# Patient Record
Sex: MALE | Race: WHITE | NOT HISPANIC OR LATINO | Employment: OTHER | URBAN - METROPOLITAN AREA
[De-identification: names, ages, dates, MRNs, and addresses within clinical notes are randomized per-mention and may not be internally consistent; named-entity substitution may affect disease eponyms.]

---

## 2018-08-15 ENCOUNTER — TRANSFERRED RECORDS (OUTPATIENT)
Dept: HEALTH INFORMATION MANAGEMENT | Facility: CLINIC | Age: 78
End: 2018-08-15

## 2019-09-30 ENCOUNTER — OFFICE VISIT (OUTPATIENT)
Dept: FAMILY MEDICINE | Facility: CLINIC | Age: 79
End: 2019-09-30
Payer: MEDICARE

## 2019-09-30 VITALS
HEIGHT: 66 IN | DIASTOLIC BLOOD PRESSURE: 75 MMHG | BODY MASS INDEX: 29.09 KG/M2 | OXYGEN SATURATION: 98 % | WEIGHT: 181 LBS | SYSTOLIC BLOOD PRESSURE: 135 MMHG | TEMPERATURE: 97.9 F | HEART RATE: 70 BPM

## 2019-09-30 DIAGNOSIS — I71.40 ABDOMINAL AORTIC ANEURYSM (AAA) WITHOUT RUPTURE (H): ICD-10-CM

## 2019-09-30 DIAGNOSIS — G47.33 OSA (OBSTRUCTIVE SLEEP APNEA): ICD-10-CM

## 2019-09-30 DIAGNOSIS — Z23 NEED FOR PROPHYLACTIC VACCINATION AND INOCULATION AGAINST INFLUENZA: ICD-10-CM

## 2019-09-30 DIAGNOSIS — Z77.090 EXPOSURE TO ASBESTOS: ICD-10-CM

## 2019-09-30 DIAGNOSIS — Z12.5 SPECIAL SCREENING FOR MALIGNANT NEOPLASM OF PROSTATE: ICD-10-CM

## 2019-09-30 DIAGNOSIS — J41.0 SIMPLE CHRONIC BRONCHITIS (H): ICD-10-CM

## 2019-09-30 DIAGNOSIS — L98.9 SKIN LESION: ICD-10-CM

## 2019-09-30 DIAGNOSIS — I10 HYPERTENSION, UNSPECIFIED TYPE: ICD-10-CM

## 2019-09-30 DIAGNOSIS — Z76.89 ENCOUNTER TO ESTABLISH CARE: Primary | ICD-10-CM

## 2019-09-30 DIAGNOSIS — J84.9 ILD (INTERSTITIAL LUNG DISEASE) (H): ICD-10-CM

## 2019-09-30 DIAGNOSIS — E78.5 HYPERLIPIDEMIA LDL GOAL <100: ICD-10-CM

## 2019-09-30 PROCEDURE — 99204 OFFICE O/P NEW MOD 45 MIN: CPT | Mod: 25 | Performed by: INTERNAL MEDICINE

## 2019-09-30 PROCEDURE — 90662 IIV NO PRSV INCREASED AG IM: CPT | Performed by: INTERNAL MEDICINE

## 2019-09-30 PROCEDURE — G0008 ADMIN INFLUENZA VIRUS VAC: HCPCS | Performed by: INTERNAL MEDICINE

## 2019-09-30 RX ORDER — UBIDECARENONE 75 MG
100 CAPSULE ORAL DAILY
COMMUNITY
End: 2023-12-01

## 2019-09-30 RX ORDER — LOSARTAN POTASSIUM 50 MG/1
50 TABLET ORAL DAILY
Refills: 3 | COMMUNITY
Start: 2019-09-25 | End: 2020-04-28

## 2019-09-30 SDOH — HEALTH STABILITY: MENTAL HEALTH: HOW OFTEN DO YOU HAVE A DRINK CONTAINING ALCOHOL?: 2-4 TIMES A MONTH

## 2019-09-30 ASSESSMENT — MIFFLIN-ST. JEOR: SCORE: 1475.82

## 2019-09-30 NOTE — PROGRESS NOTES
Subjective     Nicky Hernández is a 79 year old male who presents to clinic today for the following health issues:    HPI   New Patient/Transfer of Care  Patient presenting for establishing care , patient has no medical concerns at this time, he denies any chest pain, shortness of breath, he was diagnosed with interstitial lung disease in the past at [facility] and COPD; quit smoking 4 years ago, had 30-year pack smoking used to smoke pipes mainly, he uses a CPAP for 14 years now, he needs new filters and tubes and masks.  Will refer to sleep study.  Denies any allergies, denies any chest pain palpitations presyncope or syncope, denies any chest tightness, never had PFTs done, he never used inhalers in the past, no GI symptoms, no melena or hematochezia, he had a colonoscopy last year and was told to do in 10 years he has a urine void stream is slow he has nocturia x2.  He describes he might have been exposed to asbestos.  Family history Father had lung cancer,  previous surgical history none, also patient describes that he was treated for Lyme disease 2 years ago, he did have the rash with that, he was sick looking he was given doxycycline for 4 weeks.  He does have an abdominal aortic aneurysm at 4.2 cm and stable as per the patient, no records are available.    Patient Active Problem List   Diagnosis     Hypertension, unspecified type     Abdominal aortic aneurysm (AAA) without rupture (H)     Skin lesion     RICHIE (obstructive sleep apnea)     Hyperlipidemia LDL goal <100     ILD (interstitial lung disease) (H)     Simple chronic bronchitis (H)     Exposure to asbestos     History reviewed. No pertinent surgical history.    Social History     Tobacco Use     Smoking status: Former Smoker     Packs/day: 0.00     Smokeless tobacco: Never Used   Substance Use Topics     Alcohol use: Yes     Frequency: 2-4 times a month     Family History   Problem Relation Age of Onset     Lung Cancer Father          Current Outpatient  "Medications   Medication Sig Dispense Refill     aspirin (ASA) 81 MG tablet Take 81 mg by mouth daily       atorvastatin (LIPITOR) 40 MG tablet Take 1 tablet (40 mg) by mouth daily 90 tablet 0     cyanocobalamin (VITAMIN B-12) 100 MCG tablet Take 100 mcg by mouth daily       atorvastatin (LIPITOR) 40 MG tablet Take 1 tablet (40 mg) by mouth daily 90 tablet 0     losartan (COZAAR) 50 MG tablet Take 50 mg by mouth daily  3     No Known Allergies  Recent Labs   Lab Test 10/01/19  1000   *   HDL 44   TRIG 165*   ALT 21   CR 0.85   GFRESTIMATED 83   GFRESTBLACK >90   POTASSIUM 4.1      BP Readings from Last 3 Encounters:   09/30/19 135/75    Wt Readings from Last 3 Encounters:   09/30/19 82.1 kg (181 lb)                    Reviewed and updated as needed this visit by Provider  Med Hx  Surg Hx         Review of Systems   ROS COMP: Constitutional, HEENT, cardiovascular, pulmonary, GI, , musculoskeletal, neuro, skin, endocrine and psych systems are negative, except as otherwise noted.      Objective    /75 (BP Location: Right arm, Cuff Size: Adult Regular)   Pulse 70   Temp 97.9  F (36.6  C) (Tympanic)   Ht 1.664 m (5' 5.5\")   Wt 82.1 kg (181 lb)   SpO2 98%   BMI 29.66 kg/m    Body mass index is 29.66 kg/m .  Physical Exam   GENERAL: healthy, alert and no distress  EYES: Eyes grossly normal to inspection, PERRL and conjunctivae and sclerae normal  HENT: ear canals and TM's normal, nose and mouth without ulcers or lesions  NECK: no adenopathy, no asymmetry, masses, or scars and thyroid normal to palpation  RESP: lungs clear to auscultation - no rales, rhonchi or wheezes  CV: regular rate and rhythm, normal S1 S2, no S3 or S4, no murmur, click or rub, no peripheral edema and peripheral pulses strong  ABDOMEN: soft, nontender, no hepatosplenomegaly, no masses and bowel sounds normal   (male): Declined by patient  MS: no gross musculoskeletal defects noted, no edema  SKIN: no suspicious lesions or " rashes multiple seborrheic keratotic-like lesions on the forehead and by temples and one on the right temple under the hair that is around 1.5 cm that is pretty dark and pigmented and raised.  NEURO: Normal strength and tone, mentation intact and speech normal  PSYCH: mentation appears normal, affect normal/bright  LYMPH: no cervical, supraclavicular, axillary, or inguinal adenopathy    Diagnostic Test Results:  Labs reviewed in Epic        Assessment & Plan   Problem List Items Addressed This Visit        Respiratory    RICHIE (obstructive sleep apnea)    Relevant Orders    SLEEP EVALUATION & MANAGEMENT REFERRAL - [facility] [phone]  (Age 18 and up)    ILD (interstitial lung disease) (H)    Simple chronic bronchitis (H)       Endocrine    Hyperlipidemia LDL goal <100    Relevant Medications    atorvastatin (LIPITOR) 40 MG tablet    Other Relevant Orders    Lipid panel reflex to direct LDL Fasting (Completed)    Lipid panel reflex to direct LDL Fasting    **AST FUTURE 2mo    **ALT FUTURE 2mo       Circulatory    Hypertension, unspecified type    Relevant Medications    losartan (COZAAR) 50 MG tablet    aspirin (ASA) 81 MG tablet    Other Relevant Orders    CBC with platelets (Completed)    Comprehensive metabolic panel (Completed)    Abdominal aortic aneurysm (AAA) without rupture (H)       Musculoskeletal and Integumentary    Skin lesion    Relevant Orders    DERMATOLOGY REFERRAL       Other    Exposure to asbestos      Other Visit Diagnoses     Encounter to establish care    -  Primary    Need for prophylactic vaccination and inoculation against influenza        Relevant Orders    INFLUENZA (HIGH DOSE) 3 VALENT VACCINE [21644] (Completed)    ADMIN INFLUENZA (For MEDICARE Patients ONLY) [] (Completed)    Special screening for malignant neoplasm of prostate        Relevant Orders    PSA, screen (Completed)         We will need the records to check on his abdominal aortic  "aneurysm.  Continue with lifestyle changes, he said when he cut down on salt and changed his diet and followed  Lifestyle changes,  his blood pressure went down to the 130s from 160s.  Up-to-date on his colonoscopy and would hold on the Pneumovax or Prevnar 13 saying that he may have got that last year.  Follow-up in 2 months and as needed.    BMI:   Estimated body mass index is 29.66 kg/m  as calculated from the following:    Height as of this encounter: 1.664 m (5' 5.5\").    Weight as of this encounter: 82.1 kg (181 lb).   Weight management plan: Discussed healthy diet and exercise guidelines        Work on weight loss  Regular exercise  See Patient Instructions  Return in about 4 weeks (around 10/28/2019) for Routine Visit.    Tom Oneal MD  [facility]    "

## 2019-10-01 ENCOUNTER — TELEPHONE (OUTPATIENT)
Dept: FAMILY MEDICINE | Facility: CLINIC | Age: 79
End: 2019-10-01

## 2019-10-01 DIAGNOSIS — I10 HYPERTENSION, UNSPECIFIED TYPE: ICD-10-CM

## 2019-10-01 DIAGNOSIS — Z12.5 SPECIAL SCREENING FOR MALIGNANT NEOPLASM OF PROSTATE: ICD-10-CM

## 2019-10-01 DIAGNOSIS — I71.40 ABDOMINAL AORTIC ANEURYSM (AAA) WITHOUT RUPTURE (H): ICD-10-CM

## 2019-10-01 DIAGNOSIS — E78.5 HYPERLIPIDEMIA LDL GOAL <100: Primary | ICD-10-CM

## 2019-10-01 DIAGNOSIS — Z13.220 LIPID SCREENING: ICD-10-CM

## 2019-10-01 LAB
ALBUMIN SERPL-MCNC: 4 G/DL (ref 3.4–5)
ALP SERPL-CCNC: 86 U/L (ref 40–150)
ALT SERPL W P-5'-P-CCNC: 21 U/L (ref 0–70)
ANION GAP SERPL CALCULATED.3IONS-SCNC: 4 MMOL/L (ref 3–14)
AST SERPL W P-5'-P-CCNC: 17 U/L (ref 0–45)
BILIRUB SERPL-MCNC: 0.6 MG/DL (ref 0.2–1.3)
BUN SERPL-MCNC: 10 MG/DL (ref 7–30)
CALCIUM SERPL-MCNC: 9.5 MG/DL (ref 8.5–10.1)
CHLORIDE SERPL-SCNC: 104 MMOL/L (ref 94–109)
CHOLEST SERPL-MCNC: 190 MG/DL
CO2 SERPL-SCNC: 28 MMOL/L (ref 20–32)
CREAT SERPL-MCNC: 0.85 MG/DL (ref 0.66–1.25)
ERYTHROCYTE [DISTWIDTH] IN BLOOD BY AUTOMATED COUNT: 13.8 % (ref 10–15)
GFR SERPL CREATININE-BSD FRML MDRD: 83 ML/MIN/{1.73_M2}
GLUCOSE SERPL-MCNC: 98 MG/DL (ref 70–99)
HCT VFR BLD AUTO: 40.5 % (ref 40–53)
HDLC SERPL-MCNC: 44 MG/DL
HGB BLD-MCNC: 13.9 G/DL (ref 13.3–17.7)
LDLC SERPL CALC-MCNC: 113 MG/DL
MCH RBC QN AUTO: 31.3 PG (ref 26.5–33)
MCHC RBC AUTO-ENTMCNC: 34.3 G/DL (ref 31.5–36.5)
MCV RBC AUTO: 91 FL (ref 78–100)
NONHDLC SERPL-MCNC: 146 MG/DL
PLATELET # BLD AUTO: 206 10E9/L (ref 150–450)
POTASSIUM SERPL-SCNC: 4.1 MMOL/L (ref 3.4–5.3)
PROT SERPL-MCNC: 7.4 G/DL (ref 6.8–8.8)
PSA SERPL-ACNC: 1.06 UG/L (ref 0–4)
RBC # BLD AUTO: 4.44 10E12/L (ref 4.4–5.9)
SODIUM SERPL-SCNC: 136 MMOL/L (ref 133–144)
TRIGL SERPL-MCNC: 165 MG/DL
WBC # BLD AUTO: 6.4 10E9/L (ref 4–11)

## 2019-10-01 PROCEDURE — 36415 COLL VENOUS BLD VENIPUNCTURE: CPT | Performed by: INTERNAL MEDICINE

## 2019-10-01 PROCEDURE — 85027 COMPLETE CBC AUTOMATED: CPT | Performed by: INTERNAL MEDICINE

## 2019-10-01 PROCEDURE — 80053 COMPREHEN METABOLIC PANEL: CPT | Performed by: INTERNAL MEDICINE

## 2019-10-01 PROCEDURE — G0103 PSA SCREENING: HCPCS | Performed by: INTERNAL MEDICINE

## 2019-10-01 PROCEDURE — 80061 LIPID PANEL: CPT | Performed by: INTERNAL MEDICINE

## 2019-10-01 NOTE — LETTER
[place] [address] [facility]  [address]  [place]  [address]  Tel: [phone]    October 11, 2019    Nicky Hernández  [address]  [facility] [address]        Dear Mr. Hernández,    Dr. Oneal has would like you to first start with an abdominal Ultrasound which is done without contrast dye. If needed or advised by the radiologist, then we will order a follow-up CT Scan. The number to call and schedule an ultrasound is [phone].     Please let us know that you have received this message by responding here, or calling ([phone])       Sincerely,    [facility]

## 2019-10-01 NOTE — LETTER
[place] [address] [facility]  [address]  [place]  [address]  Tel: [phone]    October 11, 2019    Nicky Hernández  [address] [facility] [address]        Dear Mr. Hernández,      Dr. Oneal has would like you to first start with an abdominal Ultrasound which is done without contrast dye. If needed or advised by the radiologist, then we will order a follow-up CT Scan. The number to call and schedule an ultrasound is [phone].     Please let us know that you have received this message by responding here, or calling ([phone])       Sincerely,    [facility]          Enclosure: Lab Results

## 2019-10-01 NOTE — TELEPHONE ENCOUNTER
Notes recorded by Tom Oneal MD on 10/1/2019 at 2:25 PM CDT    Please notify patient to following lab results  Sahip, your labs were reviewed  Lipid panel reviewed, HDL [good cholesterol] 44, goal is >50, LDL[bad cholesterol] 113, goal <100, TG elevated at 165, total cholesterol 190 ,  Calculated your 10 year ASCVD [atherosclerotic cardiovascular disease] risk score according to AHA [american heart association] , and is 21% which is very elevated [>5%], I strongly recommend you start on Lipitor [atorvastatin] 40 mg once daily [choleterol medication], can start with half a tablet once daily for a week, than increase to full tablet if well tolerated at bed time,, as statin can cause muscle ache/toxicities which we can monitor and also monitor your live enzymes, you liver test are normal, kidney and electrolytes are normal  Your cbc shows normal white cell count, normal hemoglobin, so no anemia and normal platelet count  Repeat Lipid with CMP in 3 mo, fasting  As we discussed in clinic I recommend you do CT of abdomen and pelvis to look at aortic aneurysm,   Any further questions I am happy to address  Dr Oneal

## 2019-10-01 NOTE — LETTER
[address]  [address]  [place]  [address]  Tel: [phone]    October 2, 2019    Nicky Hernández  [address] [facility] [address]        Dear Roseanna Terrazasar,    Please be reassured PSA Prostate Specific Antigen test was within normal range 1.06 [nl 0-4 micrograms/l]    If you have any further questions or problems, please contact our office.      Sincerely,    Dr. Oneal/jon    Enclosure: Lab Results  Results for orders placed or performed in visit on 10/01/19   PSA, screen   Result Value Ref Range    PSA 1.06 0 - 4 ug/L   CBC with platelets   Result Value Ref Range    WBC 6.4 4.0 - 11.0 10e9/L    RBC Count 4.44 4.4 - 5.9 10e12/L    Hemoglobin 13.9 13.3 - 17.7 g/dL    Hematocrit 40.5 40.0 - 53.0 %    MCV 91 78 - 100 fl    MCH 31.3 26.5 - 33.0 pg    MCHC 34.3 31.5 - 36.5 g/dL    RDW 13.8 10.0 - 15.0 %    Platelet Count 206 150 - 450 10e9/L   Comprehensive metabolic panel   Result Value Ref Range    Sodium 136 133 - 144 mmol/L    Potassium 4.1 3.4 - 5.3 mmol/L    Chloride 104 94 - 109 mmol/L    Carbon Dioxide 28 20 - 32 mmol/L    Anion Gap 4 3 - 14 mmol/L    Glucose 98 70 - 99 mg/dL    Urea Nitrogen 10 7 - 30 mg/dL    Creatinine 0.85 0.66 - 1.25 mg/dL    GFR Estimate 83 >60 mL/min/[1.73_m2]    GFR Estimate If Black >90 >60 mL/min/[1.73_m2]    Calcium 9.5 8.5 - 10.1 mg/dL    Bilirubin Total 0.6 0.2 - 1.3 mg/dL    Albumin 4.0 3.4 - 5.0 g/dL    Protein Total 7.4 6.8 - 8.8 g/dL    Alkaline Phosphatase 86 40 - 150 U/L    ALT 21 0 - 70 U/L    AST 17 0 - 45 U/L   Lipid panel reflex to direct LDL Fasting   Result Value Ref Range    Cholesterol 190 <200 mg/dL    Triglycerides 165 (H) <150 mg/dL    HDL Cholesterol 44 >39 mg/dL    LDL Cholesterol Calculated 113 (H) <100 mg/dL    Non HDL Cholesterol 146 (H) <130 mg/dL

## 2019-10-01 NOTE — TELEPHONE ENCOUNTER
Dr Oneal,   Discussed results with patient and wife   He's ok with Atorvastatin - pended Rx  They will do CT scan to - need orders though   Please advise  Thanks,  Michelle COTTON RN

## 2019-10-01 NOTE — RESULT ENCOUNTER NOTE
Please notify patient to following lab results  Sahip, your labs were reviewed  Lipid panel reviewed, HDL [good cholesterol] 44, goal is >50, LDL[bad cholesterol] 113, goal <100, TG elevated at 165, total cholesterol 190 ,  Calculated your 10 year ASCVD [atherosclerotic cardiovascular disease] risk score according to AHA [american heart association] , and is 21% which is very elevated [>5%], I strongly recommend you start on Lipitor [atorvastatin] 40 mg once daily [choleterol medication], can start with half a tablet once daily for a week, than increase to full tablet if well tolerated at bed time,, as statin can cause muscle ache/toxicities which we can monitor and also monitor your live enzymes, you liver test are normal, kidney and electrolytes are normal  Your cbc shows normal white cell count, normal hemoglobin, so no anemia and normal platelet count  Repeat Lipid with CMP in 3 mo, fasting  As we discussed in clinic I recommend you do CT of abdomen and pelvis to look at aortic aneurysm,   Any further questions I am happy to address  Dr Oneal

## 2019-10-02 RX ORDER — ATORVASTATIN CALCIUM 40 MG/1
40 TABLET, FILM COATED ORAL DAILY
Qty: 90 TABLET | Refills: 0 | Status: SHIPPED | OUTPATIENT
Start: 2019-10-02 | End: 2020-03-10

## 2019-10-02 NOTE — RESULT ENCOUNTER NOTE
Nicky,   Please be reassured PSA Prostate Specific Antigen test was within normal range 1.06 [nl 0-4 micrograms/l]  Dr Oneal

## 2019-10-07 RX ORDER — ATORVASTATIN CALCIUM 40 MG/1
40 TABLET, FILM COATED ORAL DAILY
Qty: 90 TABLET | Refills: 0 | Status: SHIPPED | OUTPATIENT
Start: 2019-10-07 | End: 2020-03-10

## 2019-10-07 NOTE — TELEPHONE ENCOUNTER
Order was put for US for aortic aneurysm less radiation, if need to do CT will reschedule, start with US first, he may need to see vascular surgery as well, pending US result for a consult.   Any further questions Happy to address  Dr Oneal

## 2019-10-09 NOTE — TELEPHONE ENCOUNTER
Recalled (attempt #2): Left Vm to return call to clinic or view/respond to My[employer] message.     Rena MALONE RN

## 2019-10-11 NOTE — TELEPHONE ENCOUNTER
Called pt (3rd attempt) no answer, left VM to call back or review [employer] message  Letter printed and placed in outgoing mail    Renzo EPSTEIN RN

## 2019-10-12 PROBLEM — G47.33 OSA (OBSTRUCTIVE SLEEP APNEA): Status: ACTIVE | Noted: 2019-10-12

## 2019-10-12 PROBLEM — L98.9 SKIN LESION: Status: ACTIVE | Noted: 2019-10-12

## 2019-10-12 PROBLEM — E78.5 HYPERLIPIDEMIA LDL GOAL <100: Status: ACTIVE | Noted: 2019-10-12

## 2019-10-12 PROBLEM — I71.40 ABDOMINAL AORTIC ANEURYSM (AAA) WITHOUT RUPTURE (H): Status: ACTIVE | Noted: 2019-10-12

## 2019-10-12 PROBLEM — Z77.090 EXPOSURE TO ASBESTOS: Status: ACTIVE | Noted: 2019-10-12

## 2019-10-12 PROBLEM — J41.0 SIMPLE CHRONIC BRONCHITIS (H): Status: ACTIVE | Noted: 2019-10-12

## 2019-10-12 PROBLEM — J84.9 ILD (INTERSTITIAL LUNG DISEASE) (H): Status: ACTIVE | Noted: 2019-10-12

## 2019-10-12 PROBLEM — I10 HYPERTENSION, UNSPECIFIED TYPE: Status: ACTIVE | Noted: 2019-10-12

## 2019-11-11 ENCOUNTER — HOSPITAL ENCOUNTER (OUTPATIENT)
Dept: ULTRASOUND IMAGING | Facility: CLINIC | Age: 79
Discharge: HOME | End: 2019-11-11
Attending: INTERNAL MEDICINE | Admitting: INTERNAL MEDICINE
Payer: MEDICARE

## 2019-11-11 DIAGNOSIS — I71.40 ABDOMINAL AORTIC ANEURYSM (AAA) WITHOUT RUPTURE (H): ICD-10-CM

## 2019-11-11 PROCEDURE — 76775 US EXAM ABDO BACK WALL LIM: CPT

## 2019-11-12 NOTE — RESULT ENCOUNTER NOTE
Iglesia Saunders,  Please be reassured ultrasound of the abdominal aorta was normal, no finding of aneurysm  Dr. Oneal

## 2020-03-10 ENCOUNTER — OFFICE VISIT (OUTPATIENT)
Dept: SLEEP MEDICINE | Facility: CLINIC | Age: 80
End: 2020-03-10
Attending: INTERNAL MEDICINE
Payer: MEDICARE

## 2020-03-10 VITALS
HEART RATE: 60 BPM | OXYGEN SATURATION: 98 % | WEIGHT: 177 LBS | SYSTOLIC BLOOD PRESSURE: 133 MMHG | BODY MASS INDEX: 28.45 KG/M2 | DIASTOLIC BLOOD PRESSURE: 72 MMHG | HEIGHT: 66 IN

## 2020-03-10 DIAGNOSIS — G47.33 OSA (OBSTRUCTIVE SLEEP APNEA): ICD-10-CM

## 2020-03-10 PROCEDURE — 99204 OFFICE O/P NEW MOD 45 MIN: CPT | Performed by: PHYSICIAN ASSISTANT

## 2020-03-10 ASSESSMENT — MIFFLIN-ST. JEOR: SCORE: 1465.62

## 2020-03-10 NOTE — NURSING NOTE
"Chief Complaint   Patient presents with     Sleep Problem     Need cpap machine        Initial /72   Pulse 60   Ht 1.676 m (5' 6\")   Wt 80.3 kg (177 lb)   SpO2 98%   BMI 28.57 kg/m   Estimated body mass index is 28.57 kg/m  as calculated from the following:    Height as of this encounter: 1.676 m (5' 6\").    Weight as of this encounter: 80.3 kg (177 lb).    Medication Reconciliation: complete    Neck circumference: 17 inches / 44 centimeters.    DME:      "

## 2020-03-10 NOTE — PROGRESS NOTES
Sleep Consultation:    Date on this visit: 3/10/2020    Nicky Hernández is sent by Tom Oneal for a sleep consultation regarding RICHIE.    Primary Physician: Tom Oneal     Nicky Hernández presents for management of previously diagnosed RICHIE and to get a new CPAP. His medical history is significant for HTN, AAA, interstitial lung disease (2015 dx at [place]).     He had a sleep study in [facility] in 2005.  He has an electronic copy of his study that he will send to me.    He is on auto CPAP 5-20 cm. His machine is about 5 years old. It seems to be working. He uses a nasal mask. He was using a True Blue mask. He is comfortable with the pressures. Without CPAP, he wakes frequently. He often gets a mild dry mouth in the morning. He does use the humidifier. He sometimes notices mask leak. His wife will sometimes notice snoring with CPAP if the mask is leaking. His mask is about 6 months old. He replaces the cushion monthly. He used to get supplies through [employer]. He has no concerns about his sleep other than to get new equipment.    The compliance data shows that the patient used the CPAP for 89/90 nights, 97% of nights for >4 hours.  The 95th% pressure is 12.7 cm.  The 95th% leak is 38 lpm.  The average nightly usage is 7:39.  The average AHI is 2.2/hr.    Nicky goes to sleep at 11:00 PM during the week. He wakes up at 7:30 AM without an alarm. He falls asleep in 15 minutes.  Nicky denies difficulty falling asleep.  He wakes up 1-2 times a night for 10-15 minutes before falling back to sleep.  Nicky wakes up to go to the bathroom.  On weekends, Nicky goes to sleep at 11:00 PM.  He wakes up at 8:00 AM without an alarm. He falls asleep in 15 minutes.  Patient gets an average of 8 hours of sleep per night.     Patient does not use electronics in bed, watch TV in bed, worry in bed about anything and read in bed.     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 He lives with his wife.       Nicky does have a regular bed partner. They sleep in separate rooms though. Patient sleeps on his back and side. He denies morning headaches, morning confusion and restless legs. Nicky denies any bruxism, sleep walking, sleep talking, dream enactment, sleep paralysis, cataplexy and hypnogogic/hypnopompic hallucinations.    Nicky denies difficulty breathing through his nose, claustrophobia, reflux at night, heartburn and depression.      Nicky has lost 0-5 pounds in the last 5 years.  Patient describes themself as a morning person.  He would prefer to go to sleep at 11:00 PM and wake up at 7:30-8:00 AM.  Patient's [place] Sleepiness score 0/24 inconsistent with daytime sleepiness.      Nicky does not take naps. He takes no inadvertant naps.  He denies dozing while driving. Patient was counseled on the importance of driving while alert, to pull over if drowsy, or nap before getting into the vehicle if sleepy.  He uses 0-1 cups/day of coffee, 5-6 small cups/day of tea. Last caffeine intake can be in the evening.    Allergies:    No Known Allergies    Medications:    Current Outpatient Medications   Medication Sig Dispense Refill     aspirin (ASA) 81 MG tablet Take 81 mg by mouth daily       cyanocobalamin (VITAMIN B-12) 100 MCG tablet Take 100 mcg by mouth daily       losartan (COZAAR) 50 MG tablet Take 50 mg by mouth daily  3       Problem List:  Patient Active Problem List    Diagnosis Date Noted     Hypertension, unspecified type 10/12/2019     Priority: Medium     Abdominal aortic aneurysm (AAA) without rupture (H) 10/12/2019     Priority: Medium     Skin lesion 10/12/2019     Priority: Medium     RICHIE (obstructive sleep apnea) 10/12/2019     Priority: Medium     Hyperlipidemia LDL goal <100 10/12/2019     Priority: Medium     ILD (interstitial lung disease) (H) 10/12/2019     Priority: Medium     Simple chronic bronchitis (H) 10/12/2019     Priority: Medium     Exposure to asbestos 10/12/2019     Priority:  Medium        Past Medical/Surgical History:  Past Medical History:   Diagnosis Date     Abdominal aortic aneurysm (AAA) without rupture (H) 10/12/2019     Hyperlipidemia LDL goal <100 10/12/2019     Hypertension, unspecified type 10/12/2019     RICHIE (obstructive sleep apnea) 10/12/2019     No past surgical history on file.    Social History:  Social History     Socioeconomic History     Marital status:      Spouse name: Not on file     Number of children: Not on file     Years of education: Not on file     Highest education level: Not on file   Occupational History     Not on file   Social Needs     Financial resource strain: Not on file     Food insecurity     Worry: Not on file     Inability: Not on file     Transportation needs     Medical: Not on file     Non-medical: Not on file   Tobacco Use     Smoking status: Former Smoker     Packs/day: 0.00     Years: 30.00     Pack years: 0.00     Smokeless tobacco: Never Used     Tobacco comment: pipe mainly   Substance and Sexual Activity     Alcohol use: Yes     Frequency: 2-4 times a month     Comment: wine or beer, 1-2 times per week at most     Drug use: Never     Sexual activity: Not Currently   Lifestyle     Physical activity     Days per week: Not on file     Minutes per session: Not on file     Stress: Not on file   Relationships     Social connections     Talks on phone: Not on file     Gets together: Not on file     Attends Church service: Not on file     Active member of club or organization: Not on file     Attends meetings of clubs or organizations: Not on file     Relationship status: Not on file     Intimate partner violence     Fear of current or ex partner: Not on file     Emotionally abused: Not on file     Physically abused: Not on file     Forced sexual activity: Not on file   Other Topics Concern     Not on file   Social History Narrative     Not on file       Family History:  Family History   Problem Relation Age of Onset     Lung Cancer  "Father        Review of Systems:  A complete review of systems reviewed by me is negative with the exeption of what has been mentioned in the history of present illness.  CONSTITUTIONAL: NEGATIVE for weight gain/loss, fever, chills, sweats or night sweats, drug allergies.  EYES: NEGATIVE for changes in vision, blind spots, double vision.  ENT: NEGATIVE for ear pain, sore throat, sinus pain, post-nasal drip, runny nose, bloody nose  CARDIAC: NEGATIVE for fast heartbeats or fluttering in chest, chest pain or pressure, breathlessness when lying flat, swollen legs or swollen feet.  NEUROLOGIC: NEGATIVE headaches, weakness or numbness in the arms or legs.  DERMATOLOGIC: NEGATIVE for rashes, new moles or change in mole(s)  PULMONARY: NEGATIVE SOB at rest, SOB with activity, dry cough, productive cough, coughing up blood, wheezing or whistling when breathing.    GASTROINTESTINAL: NEGATIVE for nausea or vomitting, loose or watery stools, fat or grease in stools, constipation, abdominal pain, bowel movements black in color or blood noted.  GENITOURINARY: NEGATIVE for pain during urination, blood in urine, irregular menstrual periods.  GENITOURINARY:  POSITIVE for  urinating more frequently than usual  MUSCULOSKELETAL: NEGATIVE for muscle pain, bone or joint pain, swollen joints.  ENDOCRINE: NEGATIVE for diabetes.  ENDOCRINE: POSITIVE for increased thirst or urination,  LYMPHATIC: NEGATIVE for swollen lymph nodes, lumps or bumps in the breasts or nipple discharge.    Physical Examination:  Vitals: /72   Pulse 60   Ht 1.676 m (5' 6\")   Wt 80.3 kg (177 lb)   SpO2 98%   BMI 28.57 kg/m      Neck Cir (cm): 44 cm    [place] Total Score 3/10/2020   Total score - [place] 0       SHERRY Total Score: 1 (03/10/20 1200)    GENERAL APPEARANCE: healthy, alert, no distress and cooperative  EYES: Eyes grossly normal to inspection, PERRL, conjunctivae and sclerae normal and lids and lashes normal  HENT: nose and mouth without ulcers " or lesions, oropharynx crowded, soft palate dependent and tongue base enlarged  NECK: no adenopathy, no asymmetry, masses, or scars, thyroid normal to palpation and trachea midline and normal to palpation  RESP: lungs clear to auscultation - no rales, rhonchi or wheezes  CV: regular rates and rhythm, normal S1 S2, no S3 or S4, no murmur, click or rub and no irregular beats  LYMPHATICS: no cervical adenopathy  MS: extremities normal- no gross deformities noted  NEURO: Normal strength and tone, mentation intact, speech normal and cranial nerves 2-12 intact  Mallampati Class: IV.  Tonsillar Stage: 1  hidden by pillars.    Impression/Plan:    (G47.33) RICHIE (obstructive sleep apnea)  Comment: Nicky presents to establish care for previously diagnosed RICHIE and to get a prescription for a new CPAP and supplies. He was initially diagnosed in [place] in 2005. He has been on auto CPAP 5-20 cm. He uses it faithfully. His CPAP is about 5 years old now. It seems to be functioning well. He would like a new machine because he does not know when the current one might stop working. His previous machine did not last 5 years. His download shows elevated leak. He uses a True Blue nasal mask. He gets new supplies regularly. Per the download from his machine, his AHI is 2.2/hr with CPAP. He is happy with his sleep quality with CPAP. Without it, he wakes frequently. He denies a significant change in weight in the last 5-10 years. He has a history of ILD. He was last seen in 2015 at [place]. No further follow up or treatment was recommended. His daytime O2 is normal at 98%. CO2 on metabolic panel is 28.   Plan:  He will send a copy of the study. I will place an order for a replacement machine wit pressures 5-20 cm. He was advised to work with [facility] on finding a new mask as his mask is no longer made (as far as I know).    Addendum: Nicky emailed a copy of the report from 5/16/2005. However, there was no interpretation.  His AHI  was 43.5/hr (3.3/hr centrals, 14.9/hr mixed, 13.4/hr Obstructive apneas and 11.9/hr hypops). He had 2 minutes below 90% SpO2. His supine AHI was 61.9/hr, non-supine AHI was 17.8/hr.  I sent him an email stating that we would need the interpretation to get him new equipment. If he does not have that, we will need to repeat a sleep study.      He will follow up with me in approximately two months.       Polysomnography reviewed.  Obstructive sleep apnea reviewed.  Complications of untreated sleep apnea were reviewed.  45 minutes was spent during this visit, over 50% in counseling and coordination of care.   Bennett Goltz, PA-C    CC: Tom Oneal

## 2020-03-10 NOTE — PATIENT INSTRUCTIONS
Your BMI is Body mass index is 28.57 kg/m .  Weight management is a personal decision.  If you are interested in exploring weight loss strategies, the following discussion covers the approaches that may be successful. Body mass index (BMI) is one way to tell whether you are at a healthy weight, overweight, or obese. It measures your weight in relation to your height.  A BMI of 18.5 to 24.9 is in the healthy range. A person with a BMI of 25 to 29.9 is considered overweight, and someone with a BMI of 30 or greater is considered obese. More than two-thirds of American adults are considered overweight or obese.  Being overweight or obese increases the risk for further weight gain. Excess weight may lead to heart disease and diabetes.  Creating and following plans for healthy eating and physical activity may help you improve your health.  Weight control is part of healthy lifestyle and includes exercise, emotional health, and healthy eating habits. Careful eating habits lifelong are the mainstay of weight control. Though there are significant health benefits from weight loss, long-term weight loss with diet alone may be very difficult to achieve- studies show long-term success with dietary management in less than 10% of people. Attaining a healthy weight may be especially difficult to achieve in those with severe obesity. In some cases, medications, devices and surgical management might be considered.  What can you do?  If you are overweight or obese and are interested in methods for weight loss, you should discuss this with your provider.     Consider reducing daily calorie intake by 500 calories.     Keep a food journal.     Avoiding skipping meals, consider cutting portions instead.    Diet combined with exercise helps maintain muscle while optimizing fat loss. Strength training is particularly important for building and maintaining muscle mass. Exercise helps reduce stress, increase energy, and improves fitness.  Increasing exercise without diet control, however, may not burn enough calories to loose weight.       Start walking three days a week 10-20 minutes at a time    Work towards walking thirty minutes five days a week     Eventually, increase the speed of your walking for 1-2 minutes at time    In addition, we recommend that you review healthy lifestyles and methods for weight loss available through the National Institutes of Health patient information sites:  http://win.niddk.nih.gov/publications/index.htm    And look into health and wellness programs that may be available through your health insurance provider, employer, local community center, or ck club.    Weight management plan: Patient was referred to their PCP to discuss a diet and exercise plan.

## 2020-03-11 ENCOUNTER — HEALTH MAINTENANCE LETTER (OUTPATIENT)
Age: 80
End: 2020-03-11

## 2020-04-28 DIAGNOSIS — I10 HYPERTENSION, UNSPECIFIED TYPE: ICD-10-CM

## 2020-04-30 RX ORDER — LOSARTAN POTASSIUM 50 MG/1
50 TABLET ORAL DAILY
Qty: 90 TABLET | Refills: 1 | Status: SHIPPED | OUTPATIENT
Start: 2020-04-30 | End: 2020-10-23

## 2020-05-19 ENCOUNTER — MYC MEDICAL ADVICE (OUTPATIENT)
Dept: SLEEP MEDICINE | Facility: CLINIC | Age: 80
End: 2020-05-19

## 2020-05-19 NOTE — PROGRESS NOTES
"Have you been in contact with anyone with COVID-19 in the last 30 days No    Do you have any of the following symptoms:   Cough No  Fever No  Rash No  Shortness of breath No    Have you Traveled in the last 30 day? No  Have you been in contact with anyone who traveled in the last 30 day?   No     Nicky Hernández is a 79 year old male who is being evaluated via a billable telephone visit.      The patient has been notified of following:     \"This telephone visit will be conducted via a call between you and your physician/provider. We have found that certain health care needs can be provided without the need for a physical exam.  This service lets us provide the care you need with a short phone conversation.  If a prescription is necessary we can send it directly to your pharmacy.  If lab work is needed we can place an order for that and you can then stop by our lab to have the test done at a later time.    Telephone visits are billed at different rates depending on your insurance coverage. During this emergency period, for some insurers they may be billed the same as an in-person visit.  Please reach out to your insurance provider with any questions.    If during the course of the call the physician/provider feels a telephone visit is not appropriate, you will not be charged for this service.\"    Patient has given verbal consent for Telephone visit?  Yes    What phone number would you like to be contacted at? [phone]        CPAP Follow-Up Visit:    Date on this visit: 5/20/2020    Nicky Hernández has a follow-up visit today to review his CPAP use for RICHIE. He was initially seen at the [facility] for management of previously diagnosed RICHIE. His medical history is significant for HTN, AAA, interstitial lung disease (2015 dx at [place]).      He had a sleep study in [facility] in 2005.    Previous Study Results: [facility] home test. The report was obtained but not an interpretation.  Date: 5/16/2005.  " "Weight not reported.  AHI: 43.5/hr (14.9/hr mixed apnea, 3.3/hr central apnea, 13.4/hr obstructive, 11.9/hr hypopnea). O2 maria de jesus 86%.    David was unable to get a new CPAP because we could not obtain a copy of the interpretation of his sleep study. He had been using [employer]. The sleep lab in [place] no longer has the documents. He would like to get new supplies for his current machine.       PAP machine: [email or web address]. Pressure settings: 5-20 cm    The compliance data shows that the patient used the CPAP for 30/30 nights, 100% of nights for >4 hours.  The 95th% pressure is 13.2 cm.  The 95th% leak is 31.6 lpm.  The average nightly usage is 7:48.  The average AHI is 1.9/hr.       Interface:  Mask: nasal mask True Blue  Chin strap: No  Leak: Yes, headgear is wearing out  Using Humidifier: Yes  Condensation in hose or mask: Yes/No     Difficulties with therapy:    [-] Snoring with CPAP: a little \"noise\" mostly at the beginning of the night  [-] Difficulty tolerating the pressure:   [-] Epistaxis/dry nose:   [-] Nasal congestion:  [-] Dry mouth:  [+] Mouth breathing: a little dry in the morning  [-] Pain/skin breakdown: it is tight to prevent leak     Improvements noted with CPAP:   [+] waking up more refreshed  [+] sleeping better with less arousals  [+] nocturia improved   [+] improved energy level during the day    Weight change since sleep study:     His wife says he burps a lot. David denies bloating or stomach pain. He does sleep on his right.     Past medical/surgical history, family history, social history, medications and allergies were reviewed.      Problem List:  Patient Active Problem List    Diagnosis Date Noted     Hypertension, unspecified type 10/12/2019     Priority: Medium     Abdominal aortic aneurysm (AAA) without rupture (H) 10/12/2019     Priority: Medium     Skin lesion 10/12/2019     Priority: Medium     RICHIE (obstructive sleep apnea) 10/12/2019     Priority: Medium     Hyperlipidemia LDL goal <100 " 10/12/2019     Priority: Medium     ILD (interstitial lung disease) (H) 10/12/2019     Priority: Medium     Simple chronic bronchitis (H) 10/12/2019     Priority: Medium     Exposure to asbestos 10/12/2019     Priority: Medium        Impression/Plan:    (G47.33) RICHIE (obstructive sleep apnea)  (primary encounter diagnosis), (J84.9) ILD (interstitial lung disease) (H)  Comment: David was not able to get a new CPAP as we were unable to obtain an interpretation from his last study. He will need to repeat a test. In the mean time, he would like new supplies for his current machine. He will likely need to go back to [facility], where he was getting supplies in the past, until he has his repeat study. He has some mask leak, a little snoring when the pressures are low, and possibly aerophagia (wife notices a lot of burping throughout the day).  Plan: Continue auto CPAP 5-20 cm. A prescription was placed for new supplies. We will have it forwarded to [facility] until he can have a study to get him covered with [facility]. An order for an in-lab split-night PSG was placed. I will place an order for a replacement machine at that time. We may narrow his pressure range as he snores a little at the beginning of the night and may be having some aerophagia. He was encouraged to try to avoid sleeping on his right.        He will follow up with me in about 2 week(s).     23 minutes (3:31-3:54 PM) spent with patient, all of which were spent counseling, consulting, coordinating plan of care.      Bennett Goltz, PA-C    CC: No ref. provider found

## 2020-05-20 ENCOUNTER — VIRTUAL VISIT (OUTPATIENT)
Dept: SLEEP MEDICINE | Facility: CLINIC | Age: 80
End: 2020-05-20
Payer: MEDICARE

## 2020-05-20 DIAGNOSIS — G47.33 OSA (OBSTRUCTIVE SLEEP APNEA): Primary | ICD-10-CM

## 2020-05-20 DIAGNOSIS — J84.9 ILD (INTERSTITIAL LUNG DISEASE) (H): ICD-10-CM

## 2020-05-20 PROCEDURE — 99443: CPT | Performed by: PHYSICIAN ASSISTANT

## 2020-09-08 ENCOUNTER — TELEPHONE (OUTPATIENT)
Dept: SLEEP MEDICINE | Facility: CLINIC | Age: 80
End: 2020-09-08

## 2020-09-17 ENCOUNTER — MYC MEDICAL ADVICE (OUTPATIENT)
Dept: SLEEP MEDICINE | Facility: CLINIC | Age: 80
End: 2020-09-17

## 2020-09-24 ENCOUNTER — DOCUMENTATION ONLY (OUTPATIENT)
Dept: FAMILY MEDICINE | Facility: CLINIC | Age: 80
End: 2020-09-24

## 2020-09-24 DIAGNOSIS — G47.33 OSA (OBSTRUCTIVE SLEEP APNEA): Primary | ICD-10-CM

## 2020-09-24 DIAGNOSIS — E78.5 HYPERLIPIDEMIA LDL GOAL <100: ICD-10-CM

## 2020-09-24 DIAGNOSIS — I10 HYPERTENSION, UNSPECIFIED TYPE: ICD-10-CM

## 2020-09-24 DIAGNOSIS — Z12.5 SCREENING FOR PROSTATE CANCER: ICD-10-CM

## 2020-09-30 ENCOUNTER — THERAPY VISIT (OUTPATIENT)
Dept: SLEEP MEDICINE | Facility: CLINIC | Age: 80
End: 2020-09-30
Payer: MEDICARE

## 2020-09-30 DIAGNOSIS — G47.33 OSA (OBSTRUCTIVE SLEEP APNEA): ICD-10-CM

## 2020-09-30 DIAGNOSIS — J84.9 ILD (INTERSTITIAL LUNG DISEASE) (H): ICD-10-CM

## 2020-09-30 PROCEDURE — 95810 POLYSOM 6/> YRS 4/> PARAM: CPT | Performed by: INTERNAL MEDICINE

## 2020-10-02 ENCOUNTER — VIRTUAL VISIT (OUTPATIENT)
Dept: SLEEP MEDICINE | Facility: CLINIC | Age: 80
End: 2020-10-02
Payer: MEDICARE

## 2020-10-02 VITALS — BODY MASS INDEX: 28.12 KG/M2 | HEIGHT: 66 IN | WEIGHT: 175 LBS

## 2020-10-02 DIAGNOSIS — G47.33 OSA (OBSTRUCTIVE SLEEP APNEA): Primary | ICD-10-CM

## 2020-10-02 LAB — SLPCOMP: NORMAL

## 2020-10-02 PROCEDURE — 99214 OFFICE O/P EST MOD 30 MIN: CPT | Mod: 95 | Performed by: PHYSICIAN ASSISTANT

## 2020-10-02 ASSESSMENT — MIFFLIN-ST. JEOR: SCORE: 1451.54

## 2020-10-02 NOTE — PROCEDURES
" SLEEP STUDY INTERPRETATION  DIAGNOSTIC POLYSOMNOGRAPHY REPORT      Patient: PAM MONROY  YOB: 1940  Study Date: 9/30/2020  MRN: [id number]  Referring Provider: No Ref  Ordering Provider: Goltz, PA-C, Bennett    Indications for Polysomnography: The patient is a 80 year old Male who is 5' 6\" and weighs 177.0 lbs. His BMI is 28.8, [place] sleepiness scale 0 and neck circumference is 44 cm. Relevant medical history includes HTN & interstitial lung disease. A diagnostic polysomnogram was performed to evaluate for sleep apnea.    Polysomnogram Data: A full night polysomnogram recorded the standard physiologic parameters including EEG, EOG, EMG, ECG, nasal and oral airflow. Respiratory parameters of chest and abdominal movements were recorded with respiratory inductance plethysmography. Oxygen saturation was recorded by pulse oximetry. Hypopnea scoring rule used: 1B 4%.    Sleep Architecture: Sleep was fragmented, and sleep efficiency was reduced.   The total recording time of the polysomnogram was 442.5 minutes. The total sleep time was 240.0 minutes. Sleep latency was decreased at 5.5 minutes without the use of a sleep aid. REM latency was 188.0 minutes. Arousal index was increased at 60.3 arousals per hour. Sleep efficiency was decreased at 54.2%. Wake after sleep onset was 190.0 minutes. The patient spent 24.0% of total sleep time in Stage N1, 74.2% in Stage N2, 0.0% in Stage N3, and 1.9% in REM. Time in REM supine was 4.5 minutes.    Respiration: Severe obstructive and central sleep apnea.     Events ? The polysomnogram revealed a presence of 49 obstructive, 70 central, and 31 mixed apneas resulting in an apnea index of 37.5 events per hour. There were 8 obstructive hypopneas and 36 central hypopneas resulting in an obstructive hypopnea index of 2.0 and central hypopnea index of 9.0 events per hour. The combined apnea/hypopnea index was 48.5 events per hour (central apnea/hypopnea index was 26.5 events " per hour). The REM AHI was 53.3 events per hour. The supine AHI was 48.5 events per hour. The RERA index was 12.8 events per hour.  The RDI was 61.3 events per hour.    Snoring - was reported as mild.    Respiratory rate and pattern - was notable for normal respiratory rate and pattern.    Sustained Sleep Associated Hypoventilation - Transcutaneous carbon dioxide monitoring was used, however significant hypoventilation was not present with a maximum change from 44.9 to 51.1 mmHg and 0 minutes at or greater than 55 mmHg.    Sleep Associated Hypoxemia - (Greater than 5 minutes O2 sat at or below 88%) was not present. Baseline oxygen saturation was 94.9%. Lowest oxygen saturation was 75.8%. Time spent less than or equal to 88% was 2.4 minutes. Time spent less than or equal to 89% was 3.0 minutes.    Movement Activity: There was no significant movement abnormality.     Periodic Limb Activity - There were - PLMs during the entire study. The PLM index was - movements per hour. The PLM Arousal Index was - per hour.    REM EMG Activity - Excessive transient/sustained muscle activity was not present.    Nocturnal Behavior - Abnormal sleep related behaviors were not noted during/arising out of NREM / REM sleep.     Bruxism - None apparent.    Cardiac Summary: Sinus rhythm.   The average pulse rate was 52.0 bpm. The minimum pulse rate was 44.9 bpm while the maximum pulse rate was 74.2 bpm.  Arrhythmias were not noted.    Assessment:     This sleep study shows severe sleep apnea consisting of a combination of obstructive and central sleep apnea. Central apneas were brief and were not associated with prolonged cycle lengths. More than half of respiratory events (54%) were central in nature.     There was no significant hypoxemia. There was no evidence of sleep related hypoventilation on CO2 monitoring.     Sleep was fragmented and sleep efficiency was reduced. Stage N3 was not obtained and percentage of REM was significantly  reduced.     Recommendations:    Ideally, a titration PSG should be considered for optimal titration of PAP therapy.     Alternatively, treatment could be offered with Auto?titrating PAP therapy with a narrow pressure range or ASV, if there are no contraindications. Recommend clinical follow up with sleep management team.    If central apneas persist on CPAP, consider switching therapy to ASV to address central sleep apnea.     Diagnostic Codes:   Obstructive Sleep Apnea G47.33  Central Sleep Apnea G47.31  Repetitive Intrusions Into Sleep F51.8    9/30/2020 [facility] Sleep Study (177.0 lbs) - AHI 48.5, RDI 61.3, Supine AHI 48.5, REM AHI 53.3, Low O2 75.8%, Time Spent ?88% 2.4 minutes / Time Spent ?89% 3.0 minutes.    _____________________________________   Electronically Signed By: Attila Madden MD 10/02/2020

## 2020-10-02 NOTE — PROGRESS NOTES
Sleep Follow-Up Virtual Visit:    Date on this visit: 10/2/2020    Nicky Hernández has a visit today for follow-up of his sleep study done on 9/30/2020. He was initially seen at the [facility] for management of previously diagnosed RICHIE. His medical history is significant for HTN, AAA, interstitial lung disease (2015 dx at [facility]).      He had a sleep study in [facility] in 2005.    Previous Study Results: [facility] home test. The report was obtained but not an interpretation.  Date: 5/16/2005.  Weight not reported.  AHI: 43.5/hr (14.9/hr mixed apnea, 3.3/hr central apnea, 13.4/hr obstructive, 11.9/hr hypopnea). O2 maria de jesus 86%.    This sleep study was repeated to get him qualified for new CPAP equipment as his last study was missing parts of the documentation.  PSG results:  Sleep latency 5.5 minutes without Ambien.  REM achieved.   REM latency 188 minutes.  Sleep efficiency 54.2%. Total sleep time 240 minutes.    Sleep architecture:  Stage 1, 24% (5%), stage 2, 74.2% (45-55%), stage 3, 0% (15-20%), stage REM, 1.9% (20-25%).  AHI was 48.5/hr (54% centrals), with desaturations down to 78%. He spent 3 minutes below 90% SpO2 and 2.4 minutes below 89% SpO2. RDI 61.3/hr.  REM RDI 53.3/hr, consistent with severe REM RICHIE.  Supine RDI 61.3/hr, consistent with severe SUPINE RICHIE. He did not sleep laterally. Periodic Limb Movement Index 0/hour, 0/hr associated with arousals.       CPAP titration:  CPAP was not initiated due to COVID. These findings were reviewed with the patient.    He has an AirSense 10. It is over 5 years old. He thinks it is noisier now. His water chamber does not heat up.     Past medical/surgical history, family history, social history, medications and allergies were reviewed.      Problem List:  Patient Active Problem List    Diagnosis Date Noted     Hypertension, unspecified type 10/12/2019     Priority: Medium     Abdominal aortic aneurysm (AAA) without rupture (H) 10/12/2019      Priority: Medium     Skin lesion 10/12/2019     Priority: Medium     RICHIE (obstructive sleep apnea) 10/12/2019     Priority: Medium     Hyperlipidemia LDL goal <100 10/12/2019     Priority: Medium     ILD (interstitial lung disease) (H) 10/12/2019     Priority: Medium     Simple chronic bronchitis (H) 10/12/2019     Priority: Medium     Exposure to asbestos 10/12/2019     Priority: Medium        Impression/Plan:    (G47.33) RICHIE (obstructive sleep apnea)  (primary encounter diagnosis)  Comment: Nicky's sleep study showed severe apnea, AHI 43/hr, about half centrals. His sleep was very fragmented due to the wires and not having CPAP.  Plan: Comprehensive DME        Order placed for a replacement auto CPAP 5-20 cm. We spent time looking at the options for machines and the differences between the machines. We reviewed usage goals.     He will follow up with me in about 2 month(s).     34 minutes (3:30-4:04 PM) spent with patient, all of which were spent counseling, consulting, coordinating plan of care.      Bennett Goltz, PA-C    CC: No ref. provider found

## 2020-10-02 NOTE — PROGRESS NOTES
"Nicky Hernández is a 80 year old male who is being evaluated via a billable video visit.      The patient has been notified of following:     \"This video visit will be conducted via a call between you and your physician/provider. We have found that certain health care needs can be provided without the need for an in-person physical exam.  This service lets us provide the care you need with a video conversation.  If a prescription is necessary we can send it directly to your pharmacy.  If lab work is needed we can place an order for that and you can then stop by our lab to have the test done at a later time.    Video visits are billed at different rates depending on your insurance coverage.  Please reach out to your insurance provider with any questions.    If during the course of the call the physician/provider feels a video visit is not appropriate, you will not be charged for this service.\"    Patient has given verbal consent for Video visit? Yes  How would you like to obtain your AVS? MyChart  If you are dropped from the video visit, the video invite should be resent to: Send to e-mail at: [email or web address]  Will anyone else be joining your video visit? No        Video-Visit Details    Type of service:  Video Visit    Video Start Time: 3:30 PM  Video End Time: 4:04 PM    Originating Location (pt. Location): Home    Distant Location (provider location):  [facility]     Platform used for Video Visit: AmWell Bennett Ezra Goltz, PA-C, WES      "

## 2020-10-02 NOTE — PATIENT INSTRUCTIONS
Your BMI is Body mass index is 28.25 kg/m .  Weight management is a personal decision.  If you are interested in exploring weight loss strategies, the following discussion covers the approaches that may be successful. Body mass index (BMI) is one way to tell whether you are at a healthy weight, overweight, or obese. It measures your weight in relation to your height.  A BMI of 18.5 to 24.9 is in the healthy range. A person with a BMI of 25 to 29.9 is considered overweight, and someone with a BMI of 30 or greater is considered obese. More than two-thirds of American adults are considered overweight or obese.  Being overweight or obese increases the risk for further weight gain. Excess weight may lead to heart disease and diabetes.  Creating and following plans for healthy eating and physical activity may help you improve your health.  Weight control is part of healthy lifestyle and includes exercise, emotional health, and healthy eating habits. Careful eating habits lifelong are the mainstay of weight control. Though there are significant health benefits from weight loss, long-term weight loss with diet alone may be very difficult to achieve- studies show long-term success with dietary management in less than 10% of people. Attaining a healthy weight may be especially difficult to achieve in those with severe obesity. In some cases, medications, devices and surgical management might be considered.  What can you do?  If you are overweight or obese and are interested in methods for weight loss, you should discuss this with your provider.     Consider reducing daily calorie intake by 500 calories.     Keep a food journal.     Avoiding skipping meals, consider cutting portions instead.    Diet combined with exercise helps maintain muscle while optimizing fat loss. Strength training is particularly important for building and maintaining muscle mass. Exercise helps reduce stress, increase energy, and improves fitness.  Increasing exercise without diet control, however, may not burn enough calories to loose weight.       Start walking three days a week 10-20 minutes at a time    Work towards walking thirty minutes five days a week     Eventually, increase the speed of your walking for 1-2 minutes at time    In addition, we recommend that you review healthy lifestyles and methods for weight loss available through the National Institutes of Health patient information sites:  http://win.niddk.nih.gov/publications/index.htm    And look into health and wellness programs that may be available through your health insurance provider, employer, local community center, or ck club.

## 2020-10-09 ENCOUNTER — TELEPHONE (OUTPATIENT)
Dept: SLEEP MEDICINE | Facility: CLINIC | Age: 80
End: 2020-10-09

## 2020-10-12 DIAGNOSIS — I10 HYPERTENSION, UNSPECIFIED TYPE: ICD-10-CM

## 2020-10-12 DIAGNOSIS — Z12.5 SCREENING FOR PROSTATE CANCER: ICD-10-CM

## 2020-10-12 DIAGNOSIS — E78.5 HYPERLIPIDEMIA LDL GOAL <100: ICD-10-CM

## 2020-10-12 DIAGNOSIS — E78.5 HYPERLIPIDEMIA LDL GOAL <100: Primary | ICD-10-CM

## 2020-10-12 DIAGNOSIS — G47.33 OSA (OBSTRUCTIVE SLEEP APNEA): ICD-10-CM

## 2020-10-12 LAB
ALBUMIN SERPL-MCNC: 3.8 G/DL (ref 3.4–5)
ALP SERPL-CCNC: 95 U/L (ref 40–150)
ALT SERPL W P-5'-P-CCNC: 23 U/L (ref 0–70)
ANION GAP SERPL CALCULATED.3IONS-SCNC: 8 MMOL/L (ref 3–14)
AST SERPL W P-5'-P-CCNC: 18 U/L (ref 0–45)
BILIRUB SERPL-MCNC: 0.4 MG/DL (ref 0.2–1.3)
BUN SERPL-MCNC: 11 MG/DL (ref 7–30)
CALCIUM SERPL-MCNC: 10 MG/DL (ref 8.5–10.1)
CHLORIDE SERPL-SCNC: 107 MMOL/L (ref 94–109)
CHOLEST SERPL-MCNC: 190 MG/DL
CO2 SERPL-SCNC: 22 MMOL/L (ref 20–32)
CREAT SERPL-MCNC: 0.86 MG/DL (ref 0.66–1.25)
ERYTHROCYTE [DISTWIDTH] IN BLOOD BY AUTOMATED COUNT: 13.5 % (ref 10–15)
GFR SERPL CREATININE-BSD FRML MDRD: 82 ML/MIN/{1.73_M2}
GLUCOSE SERPL-MCNC: 90 MG/DL (ref 70–99)
HCT VFR BLD AUTO: 40.9 % (ref 40–53)
HDLC SERPL-MCNC: 41 MG/DL
HGB BLD-MCNC: 13.9 G/DL (ref 13.3–17.7)
LDLC SERPL CALC-MCNC: 117 MG/DL
MCH RBC QN AUTO: 30.1 PG (ref 26.5–33)
MCHC RBC AUTO-ENTMCNC: 34 G/DL (ref 31.5–36.5)
MCV RBC AUTO: 89 FL (ref 78–100)
NONHDLC SERPL-MCNC: 149 MG/DL
PLATELET # BLD AUTO: 217 10E9/L (ref 150–450)
POTASSIUM SERPL-SCNC: 4.1 MMOL/L (ref 3.4–5.3)
PROT SERPL-MCNC: 7.3 G/DL (ref 6.8–8.8)
PSA SERPL-ACNC: 1.03 UG/L (ref 0–4)
RBC # BLD AUTO: 4.62 10E12/L (ref 4.4–5.9)
SODIUM SERPL-SCNC: 137 MMOL/L (ref 133–144)
TRIGL SERPL-MCNC: 161 MG/DL
WBC # BLD AUTO: 6.7 10E9/L (ref 4–11)

## 2020-10-12 PROCEDURE — 80061 LIPID PANEL: CPT | Performed by: INTERNAL MEDICINE

## 2020-10-12 PROCEDURE — G0103 PSA SCREENING: HCPCS | Performed by: INTERNAL MEDICINE

## 2020-10-12 PROCEDURE — 85027 COMPLETE CBC AUTOMATED: CPT | Performed by: INTERNAL MEDICINE

## 2020-10-12 PROCEDURE — 80053 COMPREHEN METABOLIC PANEL: CPT | Performed by: INTERNAL MEDICINE

## 2020-10-12 RX ORDER — ATORVASTATIN CALCIUM 20 MG/1
20 TABLET, FILM COATED ORAL DAILY
Qty: 90 TABLET | Refills: 0 | Status: SHIPPED | OUTPATIENT
Start: 2020-10-12 | End: 2021-01-12

## 2020-10-14 ENCOUNTER — APPOINTMENT (OUTPATIENT)
Dept: SLEEP MEDICINE | Facility: CLINIC | Age: 80
End: 2020-10-14
Payer: MEDICARE

## 2020-10-14 ENCOUNTER — DOCUMENTATION ONLY (OUTPATIENT)
Dept: SLEEP MEDICINE | Facility: CLINIC | Age: 80
End: 2020-10-14

## 2020-10-14 NOTE — PROGRESS NOTES
Patient was offered choice of vendor and chose [facility].  Patient Nicky Hernández was set up at [facility]  on October 14, 2020. Patient received a [employer] DreamStation Auto. Pressures were set at 5-20 cm H2O.   Patient s ramp is 5 cm H2O for 30 min and FLEX/EPR is A Flex, 2.  Patient received a Resmed Mask name: Airfit N30i Nasal mask size Standard, heated tubing and heated humidifier.  Patient does need to meet compliance. Patient has a follow up on tbd  Kathrin Hamilton

## 2020-10-19 ENCOUNTER — DOCUMENTATION ONLY (OUTPATIENT)
Dept: SLEEP MEDICINE | Facility: CLINIC | Age: 80
End: 2020-10-19

## 2020-10-19 NOTE — PROGRESS NOTES
3 DAY STM VISIT    Diagnostic AHI: 48.5    PSG    Patient contacted for 3 day STM visit    Confirmed with patient at time of call- Yes Patient is still interested in STM service     Subjective measures:  Patient report  that the machine is good however the mask is not correct.  He has been having to adjust multiple times to fix.     Replacement device: Yes  STM ordered by provider: Yes     Device type: Auto-CPAP  PAP settings from order::  CPAP min 5 cm  H20       CPAP max 20 cm  H20        Mask type:    Nasal Mask     Device settings from machine      Min CPAP 5            Max CPAP 20           Assessment: Nightly usage over four hours.  Elevated AHI.  Recheck AHI at 14 day STM.   Action plan: Patient to have 14 day STM visit. Patient has a follow up visit scheduled:   not required by insurance.     Total time spent on accessing and  interpreting remote patient PAP therapy data  10 minutes  Total time spent counseling, coaching  and reviewing PAP therapy data with patient  0 minutes  [id number] no

## 2020-10-20 ASSESSMENT — ACTIVITIES OF DAILY LIVING (ADL): CURRENT_FUNCTION: NO ASSISTANCE NEEDED

## 2020-10-21 ENCOUNTER — OFFICE VISIT (OUTPATIENT)
Dept: FAMILY MEDICINE | Facility: CLINIC | Age: 80
End: 2020-10-21
Payer: MEDICARE

## 2020-10-21 VITALS
TEMPERATURE: 97.8 F | DIASTOLIC BLOOD PRESSURE: 70 MMHG | BODY MASS INDEX: 29.09 KG/M2 | SYSTOLIC BLOOD PRESSURE: 131 MMHG | HEIGHT: 66 IN | OXYGEN SATURATION: 98 % | HEART RATE: 62 BPM | WEIGHT: 181 LBS

## 2020-10-21 DIAGNOSIS — G47.33 OSA (OBSTRUCTIVE SLEEP APNEA): ICD-10-CM

## 2020-10-21 DIAGNOSIS — I10 HYPERTENSION, UNSPECIFIED TYPE: ICD-10-CM

## 2020-10-21 DIAGNOSIS — I77.810 ASCENDING AORTA DILATATION (H): ICD-10-CM

## 2020-10-21 DIAGNOSIS — Z00.00 ROUTINE HISTORY AND PHYSICAL EXAMINATION OF ADULT: Primary | ICD-10-CM

## 2020-10-21 DIAGNOSIS — Z23 NEED FOR PROPHYLACTIC VACCINATION AND INOCULATION AGAINST INFLUENZA: ICD-10-CM

## 2020-10-21 DIAGNOSIS — E78.5 HYPERLIPIDEMIA LDL GOAL <100: ICD-10-CM

## 2020-10-21 DIAGNOSIS — J84.9 ILD (INTERSTITIAL LUNG DISEASE) (H): ICD-10-CM

## 2020-10-21 PROCEDURE — G0008 ADMIN INFLUENZA VIRUS VAC: HCPCS | Performed by: INTERNAL MEDICINE

## 2020-10-21 PROCEDURE — 90662 IIV NO PRSV INCREASED AG IM: CPT | Performed by: INTERNAL MEDICINE

## 2020-10-21 PROCEDURE — 99213 OFFICE O/P EST LOW 20 MIN: CPT | Mod: 25 | Performed by: INTERNAL MEDICINE

## 2020-10-21 PROCEDURE — G0438 PPPS, INITIAL VISIT: HCPCS | Performed by: INTERNAL MEDICINE

## 2020-10-21 ASSESSMENT — ACTIVITIES OF DAILY LIVING (ADL): CURRENT_FUNCTION: NO ASSISTANCE NEEDED

## 2020-10-21 ASSESSMENT — MIFFLIN-ST. JEOR: SCORE: 1478.76

## 2020-10-21 NOTE — PROGRESS NOTES
"SUBJECTIVE:   Nicky Hernández is a 80 year old male who presents for Preventive Visit.        Patient has been advised of split billing requirements and indicates understanding: No   Are you in the first 12 months of your Medicare coverage?  No    Healthy Habits:     In general, how would you rate your overall health?  Good    Frequency of exercise:  2-3 days/week    Duration of exercise:  15-30 minutes    Do you usually eat at least 4 servings of fruit and vegetables a day, include whole grains    & fiber and avoid regularly eating high fat or \"junk\" foods?  Yes    Taking medications regularly:  Yes    Medication side effects:  None    Ability to successfully perform activities of daily living:  No assistance needed    Home Safety:  No safety concerns identified    Hearing Impairment:  No hearing concerns    In the past 6 months, have you been bothered by leaking of urine?  No    In general, how would you rate your overall mental or emotional health?  Excellent      PHQ-2 Total Score: 0    Additional concerns today:  No    Do you feel safe in your environment? Yes    Have you ever done Advance Care Planning? (For example, a Health Directive, POLST, or a discussion with a medical provider or your loved ones about your wishes): No, advance care planning information given to patient to review.  Patient plans to discuss their wishes with loved ones or provider.        Fall risk  Fallen 2 or more times in the past year?: No  Any fall with injury in the past year?: No  click delete button to remove this line now  Cognitive Screening   1) Repeat 3 items (Leader, Season, Table)    2) Clock draw: NORMAL  3) 3 item recall: Recalls 3 objects  Results: 3 items recalled: COGNITIVE IMPAIRMENT LESS LIKELY    Mini-CogTM Copyright RENEE Rodriguez. Licensed by the author for use in [facility]; reprinted with permission (asim@.[facility]). All rights reserved.      Do you have sleep apnea, excessive snoring or daytime drowsiness?: " yes    Reviewed and updated as needed this visit by clinical staff  Tobacco  Allergies  Meds  Problems  Med Hx  Surg Hx  Fam Hx          Reviewed and updated as needed this visit by Provider  Tobacco  Allergies  Meds  Problems  Med Hx  Surg Hx  Fam Hx         Social History     Tobacco Use     Smoking status: Former Smoker     Packs/day: 0.00     Years: 30.00     Pack years: 0.00     Types: Pipe     Quit date: 2015     Years since quittin.1     Smokeless tobacco: Never Used     Tobacco comment: pipe mainly   Substance Use Topics     Alcohol use: Yes     Frequency: 2-4 times a month     Comment: wine or beer, 1-2 times per week at most     If you drink alcohol do you typically have >3 drinks per day or >7 drinks per week? No    Alcohol Use 10/21/2020   Prescreen: >3 drinks/day or >7 drinks/week? -   Prescreen: >3 drinks/day or >7 drinks/week? No   No flowsheet data found.        Current providers sharing in care for this patient include:     Patient Care Team:  Tom Oneal MD as PCP - General (Internal Medicine)  Tom Oneal MD as Assigned PCP  Tom Oneal MD (Internal Medicine)    The following health maintenance items are reviewed in Epic and correct as of today:  Health Maintenance   Topic Date Due     SPIROMETRY  1940     COPD ACTION PLAN  1940     ZOSTER IMMUNIZATION (1 of 2) 1991     MEDICARE ANNUAL WELLNESS VISIT  10/21/2021     FALL RISK ASSESSMENT  10/21/2021     DTAP/TDAP/TD IMMUNIZATION (2 - Tdap) 2021     LIPID  10/12/2025     ADVANCE CARE PLANNING  10/21/2025     PHQ-2  Completed     INFLUENZA VACCINE  Completed     Pneumococcal Vaccine: 65+ Years  Completed     Pneumococcal Vaccine: Pediatrics (0 to 5 Years) and At-Risk Patients (6 to 64 Years)  Aged Out     IPV IMMUNIZATION  Aged Out     MENINGITIS IMMUNIZATION  Aged Out     HEPATITIS B IMMUNIZATION  Aged Out     Lab work is in process  Labs reviewed in EPIC  BP Readings from Last  3 Encounters:   10/21/20 131/70   03/10/20 133/72   19 135/75    Wt Readings from Last 3 Encounters:   10/21/20 82.1 kg (181 lb)   10/02/20 79.4 kg (175 lb)   03/10/20 80.3 kg (177 lb)                  Patient Active Problem List   Diagnosis     Hypertension, unspecified type     Abdominal aortic aneurysm (AAA) without rupture (H)     Skin lesion     RICHIE (obstructive sleep apnea)     Hyperlipidemia LDL goal <100     ILD (interstitial lung disease) (H)     Simple chronic bronchitis (H)     Exposure to asbestos     Past Surgical History:   Procedure Laterality Date     COLONOSCOPY         Social History     Tobacco Use     Smoking status: Former Smoker     Packs/day: 0.00     Years: 30.00     Pack years: 0.00     Types: Pipe     Quit date: 2015     Years since quittin.1     Smokeless tobacco: Never Used     Tobacco comment: pipe mainly   Substance Use Topics     Alcohol use: Yes     Frequency: 2-4 times a month     Comment: wine or beer, 1-2 times per week at most     Family History   Problem Relation Age of Onset     Lung Cancer Father          Current Outpatient Medications   Medication Sig Dispense Refill     aspirin (ASA) 81 MG tablet Take 81 mg by mouth daily       cyanocobalamin (VITAMIN B-12) 100 MCG tablet Take 100 mcg by mouth daily       losartan (COZAAR) 50 MG tablet Take 1 tablet (50 mg) by mouth daily 90 tablet 1     Vitamin D3 (CHOLECALCIFEROL) 125 MCG (5000 UT) tablet Take by mouth daily       atorvastatin (LIPITOR) 20 MG tablet Take 1 tablet (20 mg) by mouth daily (Patient not taking: Reported on 10/21/2020) 90 tablet 0     No Known Allergies  Recent Labs   Lab Test 10/12/20  0922 10/01/19  1000   * 113*   HDL 41 44   TRIG 161* 165*   ALT 23 21   CR 0.86 0.85   GFRESTIMATED 82 83   GFRESTBLACK >90 >90   POTASSIUM 4.1 4.1      Pneumonia Vaccine:Adults age 65+ who received Pneumovax (PPSV23) at 65 years or older: Should be given PCV13 > 1 year after their most recent  "PPSV23    Review of Systems  Constitutional, HEENT, cardiovascular, pulmonary, GI, , musculoskeletal, neuro, skin, endocrine and psych systems are negative, except as otherwise noted.    OBJECTIVE:   /70 (BP Location: Right arm, Patient Position: Sitting)   Pulse 62   Temp 97.8  F (36.6  C) (Oral)   Ht 1.676 m (5' 6\")   Wt 82.1 kg (181 lb)   SpO2 98%   BMI 29.21 kg/m   Estimated body mass index is 29.21 kg/m  as calculated from the following:    Height as of this encounter: 1.676 m (5' 6\").    Weight as of this encounter: 82.1 kg (181 lb).  Physical Exam  GENERAL: healthy, alert and no distress  EYES: Eyes grossly normal to inspection, PERRL and conjunctivae and sclerae normal  HENT: ear canals and TM's normal, nose and mouth without ulcers or lesions  NECK: no adenopathy, no asymmetry, masses, or scars and thyroid normal to palpation  RESP:  soft basal rales L>R, NO rhonchi or wheezes, good chest rise bilateral  CV: regular rate and rhythm, normal S1 S2, no S3 or S4, no murmur, click or rub, no peripheral edema and peripheral pulses strong  ABDOMEN: soft, nontender, no hepatosplenomegaly, no masses and bowel sounds normal   no prostate enlargement, good rectal sphincter tone, no rectal vault masses.  MS: no gross musculoskeletal defects noted, no edema  SKIN: no suspicious lesions or rashes  NEURO: Normal strength and tone, mentation intact and speech normal  PSYCH: mentation appears normal, affect normal/bright    Diagnostic Test Results:  Labs reviewed in Epic    ASSESSMENT / PLAN:   Nicky was seen today for physical and imm/inj.    Diagnoses and all orders for this visit:    Routine history and physical examination of adult    Ascending aorta dilatation (H)  -     Echocardiogram Complete; Future    Hypertension, unspecified type  -     Echocardiogram Complete; Future  -     Home Blood Pressure Monitor Order for DME - ONLY FOR DME    Need for prophylactic vaccination and inoculation against " "influenza  -     FLUZONE HIGH DOSE 65+  [86604]  -     Vaccine Administration, Initial [22304]    RICHIE (obstructive sleep apnea)    Hyperlipidemia LDL goal <100    ILD (interstitial lung disease) (H)    CT and radiograph from chest 2013 showed dilated ascending aortic root at 4.2 would need to repeat echocardiogram.  Continue monitor blood pressure and call us with blood pressure readings.  Asymptomatic ILD, consider PFT.      Patient has been advised of split billing requirements and indicates understanding: Yes  COUNSELING:  Reviewed preventive health counseling, as reflected in patient instructions       Regular exercise       Healthy diet/nutrition       Vision screening       Hearing screening       Dental care       Bladder control       Fall risk prevention       Immunizations    Vaccinated for: Influenza             Colon cancer screening       Prostate cancer screening       Osteoporosis Prevention/Bone Health    Estimated body mass index is 29.21 kg/m  as calculated from the following:    Height as of this encounter: 1.676 m (5' 6\").    Weight as of this encounter: 82.1 kg (181 lb).    Weight management plan: Discussed healthy diet and exercise guidelines    He reports that he quit smoking about 5 years ago. His smoking use included pipe. He smoked 0.00 packs per day for 30.00 years. He has never used smokeless tobacco.      Time spent face-to-face was 15 minutes with more than 50% counseling, review of records and addressing multiple health problems as listed in Assessment Plan in addition to physical..    Appropriate preventive services were discussed with this patient, including applicable screening as appropriate for cardiovascular disease, diabetes, osteopenia/osteoporosis, and glaucoma.  As appropriate for age/gender, discussed screening for colorectal cancer, prostate cancer, breast cancer, and cervical cancer. Checklist reviewing preventive services available has been given to the " patient.    Reviewed patients plan of care and provided an AVS. The Basic Care Plan (routine screening as documented in Health Maintenance) for Sahip meets the Care Plan requirement. This Care Plan has been established and reviewed with the Patient.    Counseling Resources:  ATP IV Guidelines  Pooled Cohorts Equation Calculator  Breast Cancer Risk Calculator  Breast Cancer: Medication to Reduce Risk  FRAX Risk Assessment  ICSI Preventive Guidelines  Dietary Guidelines for Americans, 2010  [employer] MyPlate  ASA Prophylaxis  Lung CA Screening    Tom Oneal MD  [facility]    Identified Health Risks:

## 2020-10-23 DIAGNOSIS — I10 HYPERTENSION, UNSPECIFIED TYPE: ICD-10-CM

## 2020-10-23 RX ORDER — LOSARTAN POTASSIUM 50 MG/1
50 TABLET ORAL DAILY
Qty: 90 TABLET | Refills: 0 | Status: SHIPPED | OUTPATIENT
Start: 2020-10-23 | End: 2021-01-18

## 2020-10-29 ENCOUNTER — DOCUMENTATION ONLY (OUTPATIENT)
Dept: SLEEP MEDICINE | Facility: CLINIC | Age: 80
End: 2020-10-29

## 2020-10-29 NOTE — PROGRESS NOTES
14 DAY STM VISIT    Diagnostic AHI: 48.5    PSG      Message left for patient to return call     Assessment: Pt not meeting objective benchmarks for AHI      Action plan: waiting for patient to return call.  and pt to have 30 day STM visit.    Device type: Auto-CPAP    PAP settings: CPAP min 5  cm  H20       CPAP max 20 cm  H20              90th % pressure 12  cm  H20    Mask type:  Nasal Mask    Objective measures: 14 day rolling measures         Compliance  92 %     % of night spent in large leak  0 % last  upload      AHI 9.84   last  Upload-primary obstructive events       Average number of minutes 463          Objective measure goal  Compliance   Goal >70%  Leak   Goal < 10%  AHI  Goal < 5  Usage  Goal >240      Total time spent on accessing and interpreting remote patient PAP therapy data  10 minutes      Total time spent counseling, coaching  and reviewing PAP therapy data with patient  0 minutes      [id number]  [id number] no (3 day STM)

## 2020-11-02 NOTE — PROGRESS NOTES
Patient returned call.    Subjective measures:   Pt states things are going well and has no issues or complaints.  Pt is benefiting from therapy.      Assessment: Pt not meeting objective benchmarks for AHI Patient meeting subjective benchmarks.     Action plan:pt to have 30 day [facility] visit. Review AHI again     Total time spent counseling, coaching  and reviewing PAP therapy data with patient  3 minutes     [id number] (this call)    [id number] no (previous call)

## 2020-11-06 ENCOUNTER — HOSPITAL ENCOUNTER (OUTPATIENT)
Dept: CARDIOLOGY | Facility: CLINIC | Age: 80
Discharge: HOME | End: 2020-11-06
Attending: INTERNAL MEDICINE | Admitting: INTERNAL MEDICINE
Payer: MEDICARE

## 2020-11-06 DIAGNOSIS — I10 HYPERTENSION, UNSPECIFIED TYPE: ICD-10-CM

## 2020-11-06 DIAGNOSIS — I77.810 ASCENDING AORTA DILATATION (H): ICD-10-CM

## 2020-11-06 PROCEDURE — 93306 TTE W/DOPPLER COMPLETE: CPT

## 2020-11-06 PROCEDURE — 93306 TTE W/DOPPLER COMPLETE: CPT | Mod: 26 | Performed by: INTERNAL MEDICINE

## 2020-11-08 DIAGNOSIS — I77.810 ASCENDING AORTA DILATATION (H): Primary | ICD-10-CM

## 2020-11-17 ENCOUNTER — DOCUMENTATION ONLY (OUTPATIENT)
Dept: SLEEP MEDICINE | Facility: CLINIC | Age: 80
End: 2020-11-17

## 2020-11-17 NOTE — PROGRESS NOTES
30 DAY STM VISIT    Diagnostic AHI: 48.5    PSG      Subjective measures:  Patient reports he is using night and feels he is used to the device.  He loves it.     Assessment: Pt not meeting objective benchmarks for AHI  Patient meeting subjective benchmarks.   Action plan:   Patient has not scheduled a follow up visit he will call later to schedule  Device type: Auto-CPAP  PAP settings: CPAP min 5 cm  H20     CPAP max 20 cm  H20         90th % pressure 10.3  cm  H20    Mask type:  Nasal Mask    Objective measures: 14 day rolling measures         Compliance  92 %     % of night spent in large leak  0 % last  upload      AHI 7.01   last  Upload-primarily hypopneas       Average number of minutes 449          Objective measure goal  Compliance   Goal >70%  Leak   Goal < 10%  AHI  Goal < 5  Usage  Goal >240      Total time spent on accessing and interpreting remote patient PAP therapy data  10 minutes    Total time spent counseling, coaching  and reviewing PAP therapy data with patient  minutes    [id number] no this call  [id number]  at 3 or 14 day STM           .

## 2020-12-11 ENCOUNTER — TELEPHONE (OUTPATIENT)
Dept: SLEEP MEDICINE | Facility: CLINIC | Age: 80
End: 2020-12-11

## 2021-01-11 DIAGNOSIS — E78.5 HYPERLIPIDEMIA LDL GOAL <100: ICD-10-CM

## 2021-01-12 ENCOUNTER — MYC MEDICAL ADVICE (OUTPATIENT)
Dept: FAMILY MEDICINE | Facility: CLINIC | Age: 81
End: 2021-01-12

## 2021-01-12 RX ORDER — ATORVASTATIN CALCIUM 20 MG/1
20 TABLET, FILM COATED ORAL DAILY
Qty: 90 TABLET | Refills: 0 | Status: SHIPPED | OUTPATIENT
Start: 2021-01-12 | End: 2021-02-12

## 2021-01-12 NOTE — TELEPHONE ENCOUNTER
Routing refill request to provider for review/approval because:  New med started 3 mos ago, sent Hoblee message advising due for fasting follow up OV   Pended 30 day alyson NUNO RN

## 2021-01-18 DIAGNOSIS — I10 HYPERTENSION, UNSPECIFIED TYPE: ICD-10-CM

## 2021-01-18 RX ORDER — LOSARTAN POTASSIUM 50 MG/1
50 TABLET ORAL DAILY
Qty: 30 TABLET | Refills: 0 | Status: SHIPPED | OUTPATIENT
Start: 2021-01-18 | End: 2021-02-12

## 2021-02-11 NOTE — PROGRESS NOTES
Nicky is a 80 year old who is being evaluated via a billable video visit.      How would you like to obtain your AVS? MyChart  If the video visit is dropped, the invitation should be resent by: Text to cell phone: [phone]  Will anyone else be joining your video visit? Vera Paula MA    Video Start Time: 4:02 PM  Video-Visit Details    Type of service:  Video Visit    Video End Time:4:22 PM    Originating Location (pt. Location): Home    Distant Location (provider location):  [facility]     Platform used for Video Visit: UP Online     CPAP Follow-Up Visit:    Date on this visit: 2/12/2021    Nicky Hernández has a follow-up visit today for management of previously diagnosed RICHIE. His medical history is significant for HTN, AAA, interstitial lung disease (2015 dx at [facility]).      He had a sleep study in [facility] in 2005.    Previous Study Results: [facility] home test. The report was obtained but not an interpretation.  Date: 5/16/2005.  Weight not reported.  AHI: 43.5/hr (14.9/hr mixed apnea, 3.3/hr central apnea, 13.4/hr obstructive, 11.9/hr hypopnea). O2 maria de jesus 86%.     A sleep study was repeated to get him qualified for new CPAP equipment as his initial study was missing parts of the documentation.  PSG results 9/30/2020:  AHI was 48.5/hr (54% centrals), with desaturations down to 78%. He spent 3 minutes below 90% SpO2 and 2.4 minutes below 89% SpO2. RDI 61.3/hr.  REM RDI 53.3/hr.  Supine RDI 61.3/hr. He did not sleep laterally. Periodic Limb Movement Index 0/hour, 0/hr associated with arousals    He has 1 concern that happens occasionally. He feels he is swallowing air and is bloated.     PAP machine: Respironics. Pressure settings: 5-20 cm    The compliance data shows that the patient used the CPAP for 30/30 nights, 100% of nights for >4 hours.  The 90th% pressure is 10.4 cm.  The average time in large leak is 1 min. There is evidence of mouth leak, which may be  contributing to the elevated AHI. The average nightly usage is 8:02.  The average AHI is 7.9/hr (5.1/hr hyp, 0.3/hr CA, 2.4/hr OA).       Interface:  Mask: N30i mask  Chin strap: No  Leak: Yes/No  Using Humidifier: Yes  Condensation in hose or mask: Yes/No     Difficulties with therapy:    [-] Snoring with CPAP:  [-] Difficulty tolerating the pressure:  [-] Epistaxis/dry nose:   [-] Nasal congestion:  [+] Dry mouth: a little, but not much  [-] Mouth breathing:   [-] Pain/skin breakdown:      Improvements noted with CPAP:   [+] waking up more refreshed  [+] sleeping better with less arousals  [+] nocturia improved   [+] improved energy level during the day    Weight change since sleep study: 175# last October, no major change since then.    Bedtime: 10:30-11 PM.  Wake time: 7:30-8 AM. Falls asleep in 15 minutes. Wakes 1 times per night for 5 minutes.  Naps: no.       Past medical/surgical history, family history, social history, medications and allergies were reviewed.      Problem List:  Patient Active Problem List    Diagnosis Date Noted     Hypertension, unspecified type 10/12/2019     Priority: Medium     Abdominal aortic aneurysm (AAA) without rupture (H) 10/12/2019     Priority: Medium     Skin lesion 10/12/2019     Priority: Medium     RICHIE (obstructive sleep apnea) 10/12/2019     Priority: Medium     Hyperlipidemia LDL goal <100 10/12/2019     Priority: Medium     ILD (interstitial lung disease) (H) 10/12/2019     Priority: Medium     Simple chronic bronchitis (H) 10/12/2019     Priority: Medium     Exposure to asbestos 10/12/2019     Priority: Medium        Impression/Plan:    (G47.33) RICHIE (obstructive sleep apnea)  (primary encounter diagnosis)  Comment: Nicky is doing well with his new CPAP but is experiencing aerophagia. He got a new mask, switched from True Blue to N30i. His old mask leaked more. His 95th% pressure was 13 cm. With new machine and mask, his 90th% pressure is 10.4 cm. He does have some  mouth leak when the pressure is over 10 cm. His AHI is higher now, 7.9/hr (mostly hypopneas) vs 1.9/hr on his old machine.   Plan: Comprehensive DME        Order placed to change pressures to 6-10 cm. I am sending him a [employer] message future dated 1 month from now as a reminder to give me an update on how he is tolerating the new pressure settings. I will check a download to verify his AHI is ok.      He will follow up with me in about 1 year(s).     26 minutes were spent on the date of the encounter doing chart review, history and exam, documentation and further activities as noted above.     Bennett Goltz, PA-C    CC: No ref. provider found

## 2021-02-12 ENCOUNTER — OFFICE VISIT (OUTPATIENT)
Dept: FAMILY MEDICINE | Facility: CLINIC | Age: 81
End: 2021-02-12
Payer: MEDICARE

## 2021-02-12 ENCOUNTER — VIRTUAL VISIT (OUTPATIENT)
Dept: SLEEP MEDICINE | Facility: CLINIC | Age: 81
End: 2021-02-12
Payer: MEDICARE

## 2021-02-12 VITALS
TEMPERATURE: 97.1 F | DIASTOLIC BLOOD PRESSURE: 72 MMHG | OXYGEN SATURATION: 98 % | HEART RATE: 59 BPM | HEIGHT: 66 IN | SYSTOLIC BLOOD PRESSURE: 137 MMHG | WEIGHT: 183 LBS | BODY MASS INDEX: 29.41 KG/M2

## 2021-02-12 DIAGNOSIS — L98.9 SKIN LESION: ICD-10-CM

## 2021-02-12 DIAGNOSIS — I10 HYPERTENSION, UNSPECIFIED TYPE: Primary | ICD-10-CM

## 2021-02-12 DIAGNOSIS — I77.810 ASCENDING AORTA DILATATION (H): ICD-10-CM

## 2021-02-12 DIAGNOSIS — J84.9 ILD (INTERSTITIAL LUNG DISEASE) (H): ICD-10-CM

## 2021-02-12 DIAGNOSIS — E78.5 HYPERLIPIDEMIA LDL GOAL <100: ICD-10-CM

## 2021-02-12 DIAGNOSIS — D22.9 SKIN MOLE: ICD-10-CM

## 2021-02-12 DIAGNOSIS — G47.33 OSA (OBSTRUCTIVE SLEEP APNEA): Primary | ICD-10-CM

## 2021-02-12 LAB
ERYTHROCYTE [DISTWIDTH] IN BLOOD BY AUTOMATED COUNT: 13.4 % (ref 10–15)
HCT VFR BLD AUTO: 41.4 % (ref 40–53)
HGB BLD-MCNC: 13.6 G/DL (ref 13.3–17.7)
MCH RBC QN AUTO: 29.5 PG (ref 26.5–33)
MCHC RBC AUTO-ENTMCNC: 32.9 G/DL (ref 31.5–36.5)
MCV RBC AUTO: 90 FL (ref 78–100)
PLATELET # BLD AUTO: 193 10E9/L (ref 150–450)
RBC # BLD AUTO: 4.61 10E12/L (ref 4.4–5.9)
WBC # BLD AUTO: 6.9 10E9/L (ref 4–11)

## 2021-02-12 PROCEDURE — 85027 COMPLETE CBC AUTOMATED: CPT | Performed by: INTERNAL MEDICINE

## 2021-02-12 PROCEDURE — 80053 COMPREHEN METABOLIC PANEL: CPT | Performed by: INTERNAL MEDICINE

## 2021-02-12 PROCEDURE — 36415 COLL VENOUS BLD VENIPUNCTURE: CPT | Performed by: INTERNAL MEDICINE

## 2021-02-12 PROCEDURE — 99213 OFFICE O/P EST LOW 20 MIN: CPT | Mod: 95 | Performed by: PHYSICIAN ASSISTANT

## 2021-02-12 PROCEDURE — 99214 OFFICE O/P EST MOD 30 MIN: CPT | Performed by: INTERNAL MEDICINE

## 2021-02-12 PROCEDURE — 80061 LIPID PANEL: CPT | Performed by: INTERNAL MEDICINE

## 2021-02-12 RX ORDER — LOSARTAN POTASSIUM 50 MG/1
50 TABLET ORAL DAILY
Qty: 90 TABLET | Refills: 1 | Status: SHIPPED | OUTPATIENT
Start: 2021-02-12 | End: 2021-06-09

## 2021-02-12 RX ORDER — ATORVASTATIN CALCIUM 20 MG/1
20 TABLET, FILM COATED ORAL DAILY
Qty: 90 TABLET | Refills: 1 | Status: SHIPPED | OUTPATIENT
Start: 2021-02-12 | End: 2022-04-11

## 2021-02-12 SDOH — HEALTH STABILITY: MENTAL HEALTH: HOW MANY STANDARD DRINKS CONTAINING ALCOHOL DO YOU HAVE ON A TYPICAL DAY?: NOT ASKED

## 2021-02-12 SDOH — HEALTH STABILITY: MENTAL HEALTH: HOW OFTEN DO YOU HAVE 6 OR MORE DRINKS ON ONE OCCASION?: NOT ASKED

## 2021-02-12 ASSESSMENT — MIFFLIN-ST. JEOR: SCORE: 1487.83

## 2021-02-12 NOTE — PROGRESS NOTES
Assessment & Plan   Problem List Items Addressed This Visit        Respiratory    ILD (interstitial lung disease) (H)       Endocrine    Hyperlipidemia LDL goal <100    Relevant Medications    atorvastatin (LIPITOR) 20 MG tablet    Other Relevant Orders    Lipid panel reflex to direct LDL Fasting (Completed)       Circulatory    Hypertension, unspecified type - Primary    Relevant Medications    losartan (COZAAR) 50 MG tablet    Other Relevant Orders    Comprehensive metabolic panel (BMP + Alb, Alk Phos, ALT, AST, Total. Bili, TP) (Completed)    CBC with platelets (Completed)       Musculoskeletal and Integumentary    Skin lesion    Relevant Orders    DERMATOLOGY ADULT REFERRAL      Other Visit Diagnoses     Ascending aorta dilatation (H)        Skin mole        Relevant Orders    DERMATOLOGY ADULT REFERRAL         Follow up with cardiology;  dilated asc aorta, good BP control, on ARB, call us with readings  Pulm, has ILD findings on CT chest, asymptomatic, declines to see Pulm now, Pox 98% on RA,   Continue on statin; ASA 81 daily  colonoscopy 2017- was told nl  Use CPAP for RICHIE,   Referral to derm, has mole dark for years on scalp and another Raised scaly lesion left cheek.         MEDICATIONS:  Continue current medications without change  Work on weight loss  Regular exercise  See Patient Instructions    Return in about 3 months (around 5/12/2021), or if symptoms worsen or fail to improve, for BP Recheck.    Tom Oneal MD  [facility] RAMIRO Saunders is a 80 year old who presents for the following health issues       HPI       Last night :, 135, 116 yesterday, diastolic BP 73-77    US AAA was normal    Echo dilated asc aorta 4.3 cm; Mild LVH     Using CPAP  New machine, follow up today with sleep medicine    no CP, No SOB,     Colonoscopy in 2017- was nl    US has multiple Gallstones, no post prandial abd pain    Urine normal, void flow normal, nocturia, x1     CTA  "11/2019 had basal fibrotic changes and stable mild Asc aorta, also showed fibrotic scarring in dependent and non dependent areas of lung, there was GGO that resolved, patient not short of breath; for exercise walks mainly \"slow \"    Review of Systems    ROS: 10 point ROS neg other than the symptoms noted above in the HPI.      Objective    /72   Pulse 59   Temp 97.1  F (36.2  C) (Temporal)   Ht 1.676 m (5' 6\")   Wt 83 kg (183 lb)   SpO2 98%   BMI 29.54 kg/m    Body mass index is 29.54 kg/m .  Physical Exam   GENERAL: healthy, alert and no distress  EYES: Eyes grossly normal to inspection, PERRL and conjunctivae and sclerae normal  HENT: ear canals and TM's normal, nose and mouth without ulcers or lesions  NECK: no adenopathy, no asymmetry, masses, or scars and thyroid normal to palpation  RESP: lungs clear to auscultation - minimal rales jeanine soft,no rhonchi or wheezes  CV: pectuse excavatum, regular rate and rhythm, normal S1 S2, no S3 or S4, grade 1 sytolic murmur, no click or rub, no peripheral edema and peripheral pulses strong  ABDOMEN: soft, nontender, no hepatosplenomegaly, no masses and bowel sounds normal  MS: no gross musculoskeletal defects noted, no edema  SKIN: has dark brown mole~ 1 cm on right scalp area and left cheek.raised rough scaly lesion, non crusted (chronic),.  NEURO: Normal strength and tone, mentation intact and speech normal  PSYCH: mentation appears normal, affect normal/bright    Orders Only on 10/12/2020   Component Date Value Ref Range Status     PSA 10/12/2020 1.03  0 - 4 ug/L Final    Assay Method:  Chemiluminescence using Siemens Vista analyzer     WBC 10/12/2020 6.7  4.0 - 11.0 10e9/L Final     RBC Count 10/12/2020 4.62  4.4 - 5.9 10e12/L Final     Hemoglobin 10/12/2020 13.9  13.3 - 17.7 g/dL Final     Hematocrit 10/12/2020 40.9  40.0 - 53.0 % Final     MCV 10/12/2020 89  78 - 100 fl Final     MCH 10/12/2020 30.1  26.5 - 33.0 pg Final     MCHC 10/12/2020 34.0  31.5 - " 36.5 g/dL Final     RDW 10/12/2020 13.5  10.0 - 15.0 % Final     Platelet Count 10/12/2020 217  150 - 450 10e9/L Final     Sodium 10/12/2020 137  133 - 144 mmol/L Final     Potassium 10/12/2020 4.1  3.4 - 5.3 mmol/L Final     Chloride 10/12/2020 107  94 - 109 mmol/L Final     Carbon Dioxide 10/12/2020 22  20 - 32 mmol/L Final     Anion Gap 10/12/2020 8  3 - 14 mmol/L Final     Glucose 10/12/2020 90  70 - 99 mg/dL Final     Urea Nitrogen 10/12/2020 11  7 - 30 mg/dL Final     Creatinine 10/12/2020 0.86  0.66 - 1.25 mg/dL Final     GFR Estimate 10/12/2020 82  >60 mL/min/[1.73_m2] Final    Comment: Non  GFR Calc  Starting 12/18/2018, serum creatinine based estimated GFR (eGFR) will be   calculated using the Chronic Kidney Disease Epidemiology Collaboration   (CKD-EPI) equation.       GFR Estimate If Black 10/12/2020 >90  >60 mL/min/[1.73_m2] Final    Comment:  GFR Calc  Starting 12/18/2018, serum creatinine based estimated GFR (eGFR) will be   calculated using the Chronic Kidney Disease Epidemiology Collaboration   (CKD-EPI) equation.       Calcium 10/12/2020 10.0  8.5 - 10.1 mg/dL Final     Bilirubin Total 10/12/2020 0.4  0.2 - 1.3 mg/dL Final     Albumin 10/12/2020 3.8  3.4 - 5.0 g/dL Final     Protein Total 10/12/2020 7.3  6.8 - 8.8 g/dL Final     Alkaline Phosphatase 10/12/2020 95  40 - 150 U/L Final     ALT 10/12/2020 23  0 - 70 U/L Final     AST 10/12/2020 18  0 - 45 U/L Final     Cholesterol 10/12/2020 190  <200 mg/dL Final     Triglycerides 10/12/2020 161* <150 mg/dL Final    Comment: Borderline high:  150-199 mg/dl  High:             200-499 mg/dl  Very high:       >499 mg/dl       HDL Cholesterol 10/12/2020 41  >39 mg/dL Final     LDL Cholesterol Calculated 10/12/2020 117* <100 mg/dL Final    Comment: Above desirable:  100-129 mg/dl  Borderline High:  130-159 mg/dL  High:             160-189 mg/dL  Very high:       >189 mg/dl       Non HDL Cholesterol 10/12/2020 149* <130 mg/dL  Final    Comment: Above Desirable:  130-159 mg/dl  Borderline high:  160-189 mg/dl  High:             190-219 mg/dl  Very high:       >219 mg/dl

## 2021-02-13 LAB
ALBUMIN SERPL-MCNC: 3.9 G/DL (ref 3.4–5)
ALP SERPL-CCNC: 98 U/L (ref 40–150)
ALT SERPL W P-5'-P-CCNC: 44 U/L (ref 0–70)
ANION GAP SERPL CALCULATED.3IONS-SCNC: 5 MMOL/L (ref 3–14)
AST SERPL W P-5'-P-CCNC: 30 U/L (ref 0–45)
BILIRUB SERPL-MCNC: 0.4 MG/DL (ref 0.2–1.3)
BUN SERPL-MCNC: 14 MG/DL (ref 7–30)
CALCIUM SERPL-MCNC: 9.8 MG/DL (ref 8.5–10.1)
CHLORIDE SERPL-SCNC: 106 MMOL/L (ref 94–109)
CHOLEST SERPL-MCNC: 109 MG/DL
CO2 SERPL-SCNC: 26 MMOL/L (ref 20–32)
CREAT SERPL-MCNC: 0.96 MG/DL (ref 0.66–1.25)
GFR SERPL CREATININE-BSD FRML MDRD: 75 ML/MIN/{1.73_M2}
GLUCOSE SERPL-MCNC: 93 MG/DL (ref 70–99)
HDLC SERPL-MCNC: 46 MG/DL
LDLC SERPL CALC-MCNC: 38 MG/DL
NONHDLC SERPL-MCNC: 63 MG/DL
POTASSIUM SERPL-SCNC: 4.4 MMOL/L (ref 3.4–5.3)
PROT SERPL-MCNC: 7.4 G/DL (ref 6.8–8.8)
SODIUM SERPL-SCNC: 137 MMOL/L (ref 133–144)
TRIGL SERPL-MCNC: 124 MG/DL

## 2021-02-15 ENCOUNTER — OFFICE VISIT (OUTPATIENT)
Dept: CARDIOLOGY | Facility: CLINIC | Age: 81
End: 2021-02-15
Attending: INTERNAL MEDICINE
Payer: MEDICARE

## 2021-02-15 VITALS
HEIGHT: 66 IN | WEIGHT: 187.3 LBS | DIASTOLIC BLOOD PRESSURE: 78 MMHG | BODY MASS INDEX: 30.1 KG/M2 | SYSTOLIC BLOOD PRESSURE: 153 MMHG | HEART RATE: 76 BPM

## 2021-02-15 DIAGNOSIS — E78.5 HYPERLIPIDEMIA LDL GOAL <100: Primary | ICD-10-CM

## 2021-02-15 DIAGNOSIS — I10 ESSENTIAL HYPERTENSION: ICD-10-CM

## 2021-02-15 DIAGNOSIS — I77.810 ASCENDING AORTA DILATATION (H): ICD-10-CM

## 2021-02-15 PROCEDURE — 93005 ELECTROCARDIOGRAM TRACING: CPT | Performed by: INTERNAL MEDICINE

## 2021-02-15 PROCEDURE — 99204 OFFICE O/P NEW MOD 45 MIN: CPT | Performed by: INTERNAL MEDICINE

## 2021-02-15 ASSESSMENT — MIFFLIN-ST. JEOR: SCORE: 1507.34

## 2021-02-15 NOTE — PROGRESS NOTES
CARDIOLOGY CLINIC CONSULTATION      REASON FOR CONSULT:   Thoracic aortic aneurysm    PRIMARY CARE PHYSICIAN:  Tom Oneal        History of Present Illness   Nicky Hernández is an extremely pleasant 80 year old male with a history of hypertension hyperlipidemia, here to discuss his thoracic aortic aneurysm.  This was first discovered on an outside CT scan in 2016 which showed a diameter of 3.6 cm in the ascending aorta.  He then had a transthoracic echocardiogram ordered by his primary care physician that showed a diameter 4.3 cm in the ascending aorta on 11/6/2020.  He also had an ultrasound of his abdomen in 2019 which showed no evidence of abdominal aortic aneurysm.  Blood pressure today is 153/78, however he states that he was rushing up the stairs prior to his blood pressure being taken.  He monitors his blood pressure at home regularly, and his blood pressures are consistently in the 120s systolic.  Looking back further in the chart, his blood pressures in the office are typically in the low 130s systolic.  Patient states that he used to smoke a pipe, but quit about 5 and half years ago.  He has no family history of heart disease that he is aware of, and no history of aortic aneurysm or aortic dissection.  He had been walking regularly prior to the recent cold streak, and would walk 30 to 60 minutes at a time with no chest pain, shortness of breath, palpitations, or other issues.  He has no lower extremity swelling.  No other complaints.      Assessment & Plan     1. Ascending thoracic aortic aneurysm, 4.3 cm on TTE  2. Hypertension, well controlled in the 120s over 70s at home  3. Hyperlipidemia, well controlled  4. Former tobacco abuse    -Check CTA of the chest now to confirm the accuracy of our TTE measurement.  If this is accurate, we will monitor with echoes going forward, but if not we will have to use CTAs and MRAs.  -Frequency of aortic imaging pending results of above.  -Reinforced importance of  blood pressure control, which seems optimal at this time based on his home readings.  Continue losartan 50 daily  -Continue Lipitor 20, LDL currently well controlled at 38 in 2/12/2021      Follow-up: Pending results of CTA        uJsto Connolly MD  Interventional Cardiology  February 15, 2021        Medications   Current Outpatient Medications   Medication     aspirin (ASA) 81 MG tablet     atorvastatin (LIPITOR) 20 MG tablet     cyanocobalamin (VITAMIN B-12) 100 MCG tablet     losartan (COZAAR) 50 MG tablet     Vitamin D3 (CHOLECALCIFEROL) 125 MCG (5000 UT) tablet     No current facility-administered medications for this visit.      Allergies   No Known Allergies      Physical Exam       BP: (!) 153/78 Pulse: 76            Vital Signs with Ranges  Pulse:  [76] 76  BP: (153)/(78) 153/78  187 lbs 4.8 oz    Constitutional: Well-appearing, no acute distress  Respiratory: Normal respiratory effort, CTAB  Cardiovascular: RRR, no m/r/g.  JVP < 7 cm H2O.  There is no LE edema.  Normal carotid upstrokes, no carotid bruits.

## 2021-02-15 NOTE — LETTER
2/15/2021    Tom Oneal MD  [address]  [facility] [address]    RE: Nicky Hernández       Dear Colleague,    I had the pleasure of seeing Nicky Hernández in the [facility].    CARDIOLOGY CLINIC CONSULTATION      REASON FOR CONSULT:   Thoracic aortic aneurysm    PRIMARY CARE PHYSICIAN:  Tom Oneal        History of Present Illness   Nicky Hernández is an extremely pleasant 80 year old male with a history of hypertension hyperlipidemia, here to discuss his thoracic aortic aneurysm.  This was first discovered on an outside CT scan in 2016 which showed a diameter of 3.6 cm in the ascending aorta.  He then had a transthoracic echocardiogram ordered by his primary care physician that showed a diameter 4.3 cm in the ascending aorta on 11/6/2020.  He also had an ultrasound of his abdomen in 2019 which showed no evidence of abdominal aortic aneurysm.  Blood pressure today is 153/78, however he states that he was rushing up the stairs prior to his blood pressure being taken.  He monitors his blood pressure at home regularly, and his blood pressures are consistently in the 120s systolic.  Looking back further in the chart, his blood pressures in the office are typically in the low 130s systolic.  Patient states that he used to smoke a pipe, but quit about 5 and half years ago.  He has no family history of heart disease that he is aware of, and no history of aortic aneurysm or aortic dissection.  He had been walking regularly prior to the recent cold streak, and would walk 30 to 60 minutes at a time with no chest pain, shortness of breath, palpitations, or other issues.  He has no lower extremity swelling.  No other complaints.      Assessment & Plan     1. Ascending thoracic aortic aneurysm, 4.3 cm on TTE  2. Hypertension, well controlled in the 120s over 70s at home  3. Hyperlipidemia, well controlled  4. Former tobacco abuse    -Check CTA of the chest now  to confirm the accuracy of our TTE measurement.  If this is accurate, we will monitor with echoes going forward, but if not we will have to use CTAs and MRAs.  -Frequency of aortic imaging pending results of above.  -Reinforced importance of blood pressure control, which seems optimal at this time based on his home readings.  Continue losartan 50 daily  -Continue Lipitor 20, LDL currently well controlled at 38 in 2/12/2021      Follow-up: Pending results of CTA        Justo Connolly MD  Interventional Cardiology  February 15, 2021        Medications   Current Outpatient Medications   Medication     aspirin (ASA) 81 MG tablet     atorvastatin (LIPITOR) 20 MG tablet     cyanocobalamin (VITAMIN B-12) 100 MCG tablet     losartan (COZAAR) 50 MG tablet     Vitamin D3 (CHOLECALCIFEROL) 125 MCG (5000 UT) tablet     No current facility-administered medications for this visit.      Allergies   No Known Allergies      Physical Exam       BP: (!) 153/78 Pulse: 76            Vital Signs with Ranges  Pulse:  [76] 76  BP: (153)/(78) 153/78  187 lbs 4.8 oz    Constitutional: Well-appearing, no acute distress  Respiratory: Normal respiratory effort, CTAB  Cardiovascular: RRR, no m/r/g.  JVP < 7 cm H2O.  There is no LE edema.  Normal carotid upstrokes, no carotid bruits.      Thank you for allowing me to participate in the care of your patient.    Sincerely,     Justo Connolly MD     [facility]   Alert and oriented to person, place and time

## 2021-02-18 ENCOUNTER — HOSPITAL ENCOUNTER (OUTPATIENT)
Dept: CARDIOLOGY | Facility: CLINIC | Age: 81
Discharge: HOME | End: 2021-02-18
Attending: INTERNAL MEDICINE | Admitting: INTERNAL MEDICINE
Payer: MEDICARE

## 2021-02-18 DIAGNOSIS — I77.810 ASCENDING AORTA DILATATION (H): ICD-10-CM

## 2021-02-18 PROCEDURE — 258N000003 HC RX IP 258 OP 636: Performed by: INTERNAL MEDICINE

## 2021-02-18 PROCEDURE — 71275 CT ANGIOGRAPHY CHEST: CPT | Mod: ME

## 2021-02-18 PROCEDURE — G1004 CDSM NDSC: HCPCS | Performed by: INTERNAL MEDICINE

## 2021-02-18 PROCEDURE — 250N000011 HC RX IP 250 OP 636: Performed by: INTERNAL MEDICINE

## 2021-02-18 PROCEDURE — 71275 CT ANGIOGRAPHY CHEST: CPT | Mod: 26 | Performed by: INTERNAL MEDICINE

## 2021-02-18 RX ORDER — IOPAMIDOL 755 MG/ML
500 INJECTION, SOLUTION INTRAVASCULAR ONCE
Status: COMPLETED | OUTPATIENT
Start: 2021-02-18 | End: 2021-02-18

## 2021-02-18 RX ADMIN — IOPAMIDOL 100 ML: 755 INJECTION, SOLUTION INTRAVENOUS at 10:54

## 2021-02-18 RX ADMIN — SODIUM CHLORIDE 100 ML: 9 INJECTION, SOLUTION INTRAVENOUS at 10:54

## 2021-02-19 NOTE — RESULT ENCOUNTER NOTE
Chest CTA only shows maximum aortic diameter of 3.7 cm (mid-ascending aorta), thankfully lower than echo reading of up to 4.3 cm.  As a result, if anything it appears the echo is over-estimating, so let's plan to follow him with a repeat echo in 1 year, and then can probably space this out if stable.    Dr. Oneal, there were a few scattered small (<4 mm) pulmonary nodules seen.  It looks like this could be followed with a repeat CT at 12 months as an option (vs no follow up).  If it's OK with you, I'll let you decide whether you wish to repeat the CT scan for him.    Thanks!  Gab Connolly

## 2021-02-22 ENCOUNTER — TELEPHONE (OUTPATIENT)
Facility: CLINIC | Age: 81
End: 2021-02-22

## 2021-02-22 ENCOUNTER — TELEPHONE (OUTPATIENT)
Dept: CARDIOLOGY | Facility: CLINIC | Age: 81
End: 2021-02-22

## 2021-02-22 DIAGNOSIS — I77.810 ASCENDING AORTA DILATATION (H): Primary | ICD-10-CM

## 2021-02-22 DIAGNOSIS — I71.40 ABDOMINAL AORTIC ANEURYSM (AAA) WITHOUT RUPTURE (H): ICD-10-CM

## 2021-02-22 NOTE — TELEPHONE ENCOUNTER
Received result note from Dr. Connolly:     Justo Connolly MD [facility] Heart Team 1   Cc: Tom Oneal MD             Chest CTA only shows maximum aortic diameter of 3.7 cm (mid-ascending aorta), thankfully lower than echo reading of up to 4.3 cm.  As a result, if anything it appears the echo is over-estimating, so let's plan to follow him with a repeat echo in 1 year, and then can probably space this out if stable.     Dr. Oneal, there were a few scattered small (<4 mm) pulmonary nodules seen.  It looks like this could be followed with a repeat CT at 12 months as an option (vs no follow up).  If it's OK with you, I'll let you decide whether you wish to repeat the CT scan for him.     Thanks!   Gab Connolly      Called pt, no answer. Left VM requesting call back to Team 1 to review results. Orders for echo and follow up in 1 year placed.     Addendum 2/22/21 - Received return call from pt, reviewed results and recommendations from Dr. Connolly to repeat echo in 1 year. Reviewed that Dr. Connolly CC'ed pt's PCP Dr. Oneal regarding the finding of pulmonary nodules and requested Dr. Oneal determine if any follow up is needed. Pt verbalized understanding, no further questions at this time.

## 2021-02-25 ENCOUNTER — IMMUNIZATION (OUTPATIENT)
Dept: PEDIATRICS | Facility: CLINIC | Age: 81
End: 2021-02-25
Payer: MEDICARE

## 2021-02-25 PROCEDURE — 0001A PR COVID VAC PFIZER DIL RECON 30 MCG/0.3 ML IM: CPT

## 2021-02-25 PROCEDURE — 91300 PR COVID VAC PFIZER DIL RECON 30 MCG/0.3 ML IM: CPT

## 2021-03-18 ENCOUNTER — IMMUNIZATION (OUTPATIENT)
Dept: PEDIATRICS | Facility: CLINIC | Age: 81
End: 2021-03-18
Attending: INTERNAL MEDICINE
Payer: MEDICARE

## 2021-03-18 PROCEDURE — 0002A PR COVID VAC PFIZER DIL RECON 30 MCG/0.3 ML IM: CPT

## 2021-03-18 PROCEDURE — 91300 PR COVID VAC PFIZER DIL RECON 30 MCG/0.3 ML IM: CPT

## 2021-03-30 ENCOUNTER — OFFICE VISIT (OUTPATIENT)
Dept: DERMATOLOGY | Facility: CLINIC | Age: 81
End: 2021-03-30
Payer: MEDICARE

## 2021-03-30 VITALS — SYSTOLIC BLOOD PRESSURE: 139 MMHG | HEART RATE: 71 BPM | OXYGEN SATURATION: 98 % | DIASTOLIC BLOOD PRESSURE: 75 MMHG

## 2021-03-30 DIAGNOSIS — L82.0 INFLAMED SEBORRHEIC KERATOSIS: Primary | ICD-10-CM

## 2021-03-30 DIAGNOSIS — D22.9 SKIN MOLE: ICD-10-CM

## 2021-03-30 DIAGNOSIS — L98.9 SKIN LESION: ICD-10-CM

## 2021-03-30 DIAGNOSIS — L82.1 SEBORRHEIC KERATOSIS: ICD-10-CM

## 2021-03-30 PROCEDURE — 17110 DESTRUCTION B9 LES UP TO 14: CPT | Performed by: PHYSICIAN ASSISTANT

## 2021-03-30 PROCEDURE — 99212 OFFICE O/P EST SF 10 MIN: CPT | Mod: 25 | Performed by: PHYSICIAN ASSISTANT

## 2021-03-30 NOTE — LETTER
3/30/2021         RE: Nicky Hernández  [address] [place] [address]        Dear Colleague,    Thank you for referring your patient, Nicky Hernández, to the [facility]. Please see a copy of my visit note below.    HPI:   Chief complaints: Nicky Hernández is a 80 year old male who presents for evaluation of an irritated spot on the left upper cheek. The spot has been present about 10 years and is growing. No history of skin CA      PHYSICAL EXAM:    /75   Pulse 71   SpO2 98%   Skin exam performed as follows: Type 2 skin. Mood appropriate  Alert and Oriented X 3. Well developed, well nourished in no distress.  General appearance: Normal  Head including face: Normal  Eyes: conjunctiva and lids: Normal  Mouth: Lips, teeth, gums: Normal  Neck: Normal  Chest-breast/axillae: Normal  Back: Normal  Spleen and liver: Normal  Cardiovascular: Exam of peripheral vascular system by observation for swelling, varicosities, edema: Normal  Genitalia: groin, buttocks: Normal  Extremities: digits/nails (clubbing): Normal  Eccrine and Apocrine glands: Normal  Right upper extremity: Normal  Left upper extremity: Normal  Right lower extremity: Normal  Left lower extremity: Normal  Skin: Scalp and body hair: See below    1. Inflamed keratotic papule on the left cheek x 1  2. Keratotic papule on the right temporal scalp, scattered across forehead and face    ASSESSMENT/PLAN:     1. Inflamed seborrheic keratosis on the left upper cheek x 1. As physically tender cryosurgery performed. Advised on post op care.   2. Seborrheic keratoses - advised benign no treatment needed.   .        Follow-up: PRN  CC:   Scribed By: Aminata Villeda, MS, PARADHA          Again, thank you for allowing me to participate in the care of your patient.        Sincerely,        Aminata Villeda PA-C

## 2021-03-30 NOTE — PROGRESS NOTES
HPI:   Chief complaints: Nicky Hernández is a 80 year old male who presents for evaluation of an irritated spot on the left upper cheek. The spot has been present about 10 years and is growing. No history of skin CA      PHYSICAL EXAM:    /75   Pulse 71   SpO2 98%   Skin exam performed as follows: Type 2 skin. Mood appropriate  Alert and Oriented X 3. Well developed, well nourished in no distress.  General appearance: Normal  Head including face: Normal  Eyes: conjunctiva and lids: Normal  Mouth: Lips, teeth, gums: Normal  Neck: Normal  Chest-breast/axillae: Normal  Back: Normal  Spleen and liver: Normal  Cardiovascular: Exam of peripheral vascular system by observation for swelling, varicosities, edema: Normal  Genitalia: groin, buttocks: Normal  Extremities: digits/nails (clubbing): Normal  Eccrine and Apocrine glands: Normal  Right upper extremity: Normal  Left upper extremity: Normal  Right lower extremity: Normal  Left lower extremity: Normal  Skin: Scalp and body hair: See below    1. Inflamed keratotic papule on the left cheek x 1  2. Keratotic papule on the right temporal scalp, scattered across forehead and face    ASSESSMENT/PLAN:     1. Inflamed seborrheic keratosis on the left upper cheek x 1. As physically tender cryosurgery performed. Advised on post op care.   2. Seborrheic keratoses - advised benign no treatment needed.   .        Follow-up: PRN  CC:   Scribed By: Aminata Villeda MS, PARADHA

## 2021-06-08 ENCOUNTER — MYC MEDICAL ADVICE (OUTPATIENT)
Dept: FAMILY MEDICINE | Facility: CLINIC | Age: 81
End: 2021-06-08

## 2021-06-08 DIAGNOSIS — I10 HYPERTENSION, UNSPECIFIED TYPE: ICD-10-CM

## 2021-06-09 RX ORDER — LOSARTAN POTASSIUM 50 MG/1
50 TABLET ORAL DAILY
Qty: 90 TABLET | Refills: 1 | Status: SHIPPED | OUTPATIENT
Start: 2021-06-09 | End: 2021-09-01

## 2021-06-09 NOTE — TELEPHONE ENCOUNTER
Prescription approved per [facility] Refill Protocol.    Sukhwinder Perez RN  [facility]

## 2021-10-10 ENCOUNTER — HEALTH MAINTENANCE LETTER (OUTPATIENT)
Age: 81
End: 2021-10-10

## 2021-10-19 ASSESSMENT — ENCOUNTER SYMPTOMS
CHILLS: 0
ABDOMINAL PAIN: 0
HEMATOCHEZIA: 0
JOINT SWELLING: 0
NERVOUS/ANXIOUS: 0
MYALGIAS: 0
WEAKNESS: 0
HEMATURIA: 0
HEARTBURN: 0
FEVER: 0
DIARRHEA: 0
SHORTNESS OF BREATH: 0
FREQUENCY: 0
EYE PAIN: 0
ARTHRALGIAS: 0
SORE THROAT: 0
CONSTIPATION: 0
PARESTHESIAS: 0
DIZZINESS: 0
NAUSEA: 0
HEADACHES: 0
PALPITATIONS: 0
DYSURIA: 0
COUGH: 0

## 2021-10-19 ASSESSMENT — ACTIVITIES OF DAILY LIVING (ADL): CURRENT_FUNCTION: NO ASSISTANCE NEEDED

## 2021-10-22 ENCOUNTER — OFFICE VISIT (OUTPATIENT)
Dept: FAMILY MEDICINE | Facility: CLINIC | Age: 81
End: 2021-10-22
Payer: MEDICARE

## 2021-10-22 VITALS
HEART RATE: 60 BPM | OXYGEN SATURATION: 98 % | WEIGHT: 190 LBS | DIASTOLIC BLOOD PRESSURE: 76 MMHG | RESPIRATION RATE: 16 BRPM | HEIGHT: 66 IN | SYSTOLIC BLOOD PRESSURE: 171 MMHG | TEMPERATURE: 97.7 F | BODY MASS INDEX: 30.53 KG/M2

## 2021-10-22 DIAGNOSIS — E78.5 HYPERLIPIDEMIA LDL GOAL <100: ICD-10-CM

## 2021-10-22 DIAGNOSIS — I10 HYPERTENSION, UNSPECIFIED TYPE: ICD-10-CM

## 2021-10-22 DIAGNOSIS — Z12.5 SCREENING FOR PROSTATE CANCER: ICD-10-CM

## 2021-10-22 DIAGNOSIS — G47.33 OSA (OBSTRUCTIVE SLEEP APNEA): ICD-10-CM

## 2021-10-22 DIAGNOSIS — N13.9 LOWER URINARY OBSTRUCTIVE SYMPTOM: ICD-10-CM

## 2021-10-22 DIAGNOSIS — Z23 HIGH PRIORITY FOR 2019-NCOV VACCINE: ICD-10-CM

## 2021-10-22 DIAGNOSIS — Z00.00 ROUTINE HISTORY AND PHYSICAL EXAMINATION OF ADULT: Primary | ICD-10-CM

## 2021-10-22 DIAGNOSIS — I71.40 ABDOMINAL AORTIC ANEURYSM (AAA) WITHOUT RUPTURE (H): ICD-10-CM

## 2021-10-22 DIAGNOSIS — J84.9 ILD (INTERSTITIAL LUNG DISEASE) (H): Chronic | ICD-10-CM

## 2021-10-22 LAB
ERYTHROCYTE [DISTWIDTH] IN BLOOD BY AUTOMATED COUNT: 13.8 % (ref 10–15)
HCT VFR BLD AUTO: 42 % (ref 40–53)
HGB BLD-MCNC: 14.4 G/DL (ref 13.3–17.7)
MCH RBC QN AUTO: 30.8 PG (ref 26.5–33)
MCHC RBC AUTO-ENTMCNC: 34.3 G/DL (ref 31.5–36.5)
MCV RBC AUTO: 90 FL (ref 78–100)
PLATELET # BLD AUTO: 209 10E3/UL (ref 150–450)
RBC # BLD AUTO: 4.67 10E6/UL (ref 4.4–5.9)
WBC # BLD AUTO: 6.8 10E3/UL (ref 4–11)

## 2021-10-22 PROCEDURE — 80053 COMPREHEN METABOLIC PANEL: CPT | Performed by: INTERNAL MEDICINE

## 2021-10-22 PROCEDURE — 85027 COMPLETE CBC AUTOMATED: CPT | Performed by: INTERNAL MEDICINE

## 2021-10-22 PROCEDURE — G0439 PPPS, SUBSEQ VISIT: HCPCS | Performed by: INTERNAL MEDICINE

## 2021-10-22 PROCEDURE — 82043 UR ALBUMIN QUANTITATIVE: CPT | Performed by: INTERNAL MEDICINE

## 2021-10-22 PROCEDURE — 0004A COVID-19,PF,PFIZER (12+ YRS): CPT | Performed by: INTERNAL MEDICINE

## 2021-10-22 PROCEDURE — 99214 OFFICE O/P EST MOD 30 MIN: CPT | Mod: 25 | Performed by: INTERNAL MEDICINE

## 2021-10-22 PROCEDURE — 91300 COVID-19,PF,PFIZER (12+ YRS): CPT | Performed by: INTERNAL MEDICINE

## 2021-10-22 PROCEDURE — 36415 COLL VENOUS BLD VENIPUNCTURE: CPT | Performed by: INTERNAL MEDICINE

## 2021-10-22 PROCEDURE — G0103 PSA SCREENING: HCPCS | Performed by: INTERNAL MEDICINE

## 2021-10-22 PROCEDURE — 80061 LIPID PANEL: CPT | Performed by: INTERNAL MEDICINE

## 2021-10-22 RX ORDER — TAMSULOSIN HYDROCHLORIDE 0.4 MG/1
0.4 CAPSULE ORAL DAILY
Qty: 90 CAPSULE | Refills: 0 | Status: SHIPPED | OUTPATIENT
Start: 2021-10-22 | End: 2022-02-24

## 2021-10-22 ASSESSMENT — ACTIVITIES OF DAILY LIVING (ADL): CURRENT_FUNCTION: NO ASSISTANCE NEEDED

## 2021-10-22 ASSESSMENT — MIFFLIN-ST. JEOR: SCORE: 1514.58

## 2021-10-22 NOTE — PROGRESS NOTES
"SUBJECTIVE:   Nicky Hernández is a 81 year old male who presents for Preventive Visit.        Patient has been advised of split billing requirements and indicates understanding: Yes   Are you in the first 12 months of your Medicare coverage?  No    Healthy Habits:     In general, how would you rate your overall health?  Good    Frequency of exercise:  4-5 days/week    Duration of exercise:  30-45 minutes    Do you usually eat at least 4 servings of fruit and vegetables a day, include whole grains    & fiber and avoid regularly eating high fat or \"junk\" foods?  Yes    Taking medications regularly:  Yes    Medication side effects:  None    Ability to successfully perform activities of daily living:  No assistance needed    Home Safety:  No safety concerns identified    Hearing Impairment:  No hearing concerns    In the past 6 months, have you been bothered by leaking of urine?  No    In general, how would you rate your overall mental or emotional health?  Good      PHQ-2 Total Score: 0    Do you feel safe in your environment? Yes    Have you ever done Advance Care Planning? (For example, a Health Directive, POLST, or a discussion with a medical provider or your loved ones about your wishes): No, advance care planning information given to patient to review.  Patient plans to discuss their wishes with loved ones or provider.      Hearing is good , drinks alcohol twice a week, not heavy     Uses CPAP machine, has new one,     walking, 45 min - 1.5 hr  Every day if weather, permits, short walks in winter 20 min, walks in close.    Vision is OK with eye glasses, last seen in 2 yrs    No choking with food,   Has new dentures, was in [place]..        Fall risk: no recent falls,     Fallen 2 or more times in the past year?: No  Any fall with injury in the past year?: No    Cognitive Screening   1) Repeat 3 items (Leader, Season, Table)    2) Clock draw: NORMAL  3) 3 item recall: Recalls 3 objects  Results: 3 items recalled: " COGNITIVE IMPAIRMENT LESS LIKELY    Mini-CogTM Copyright RENEE Rodriguez. Licensed by the author for use in [facility]; reprinted with permission (asim@.[facility]). All rights reserved.      Do you have sleep apnea, excessive snoring or daytime drowsiness?: no    Reviewed and updated as needed this visit by clinical staff  Tobacco  Allergies  Meds  Problems             Reviewed and updated as needed this visit by Provider  Tobacco    Problems            Social History     Tobacco Use     Smoking status: Former Smoker     Packs/day: 0.00     Years: 30.00     Pack years: 0.00     Types: Pipe     Quit date: 2015     Years since quittin.1     Smokeless tobacco: Never Used     Tobacco comment: pipe mainly stopped 5.5 yrs ago   Substance Use Topics     Alcohol use: Yes     Comment: Not regularly, occasional.           No flowsheet data found.        Current providers sharing in care for this patient include:   Patient Care Team:  Tom Oneal MD as PCP - General (Internal Medicine)  Tom Oneal MD as Assigned PCP  Tom Oneal MD (Internal Medicine)  Justo Connolly MD as Assigned Heart and Vascular Provider  Goltz, Bennett Ezra, PA-C as Assigned Neuroscience Provider  Amintaa Villeda PA-C as Assigned Surgical Provider    The following health maintenance items are reviewed in Epic and correct as of today:  Health Maintenance Due   Topic Date Due     SPIROMETRY  Never done     ANNUAL REVIEW OF HM ORDERS  Never done     COPD ACTION PLAN  Never done     ZOSTER IMMUNIZATION (1 of 2) Never done     Lab work is in process  Labs reviewed in EPIC  BP Readings from Last 3 Encounters:   10/22/21 (!) 171/76   21 139/75   02/15/21 (!) 153/78    Wt Readings from Last 3 Encounters:   10/22/21 86.2 kg (190 lb)   02/15/21 85 kg (187 lb 4.8 oz)   21 83 kg (183 lb)                  Patient Active Problem List   Diagnosis     Hypertension, unspecified type     Abdominal aortic  aneurysm (AAA) without rupture (H)     Skin lesion     RICHIE (obstructive sleep apnea)     Hyperlipidemia LDL goal <100     ILD (interstitial lung disease) (H)     Simple chronic bronchitis (H)     Exposure to asbestos     Past Surgical History:   Procedure Laterality Date     COLONOSCOPY         Social History     Tobacco Use     Smoking status: Former Smoker     Packs/day: 0.00     Years: 30.00     Pack years: 0.00     Types: Pipe     Quit date: 2015     Years since quittin.1     Smokeless tobacco: Never Used     Tobacco comment: pipe mainly stopped 5.5 yrs ago   Substance Use Topics     Alcohol use: Yes     Comment: Not regularly, occasional.     Family History   Problem Relation Age of Onset     Lung Cancer Father          Current Outpatient Medications   Medication Sig Dispense Refill     aspirin (ASA) 81 MG tablet Take 81 mg by mouth daily       atorvastatin (LIPITOR) 20 MG tablet Take 1 tablet (20 mg) by mouth daily 90 tablet 1     cyanocobalamin (VITAMIN B-12) 100 MCG tablet Take 100 mcg by mouth daily       losartan (COZAAR) 50 MG tablet Take 1 tablet (50 mg) by mouth daily 90 tablet 1     tamsulosin (FLOMAX) 0.4 MG capsule Take 1 capsule (0.4 mg) by mouth daily 90 capsule 0     Vitamin D3 (CHOLECALCIFEROL) 125 MCG (5000 UT) tablet Take by mouth daily       No Known Allergies  Recent Labs   Lab Test 10/22/21  0814 21  1211 10/12/20  0922 10/12/20  0922   LDL 73 38  --  117*   HDL 44 46  --  41   TRIG 129 124  --  161*   ALT 35 44  --  23   CR 0.92 0.96   < > 0.86   GFRESTIMATED 78 75   < > 82   GFRESTBLACK  --  86  --  >90   POTASSIUM 4.1 4.4   < > 4.1    < > = values in this interval not displayed.      Pneumonia Vaccine:Adults age 65+ who received Pneumovax (PPSV23) at 65 years or older: Should be given PCV13 > 1 year after their most recent PPSV23        Review of Systems  Constitutional, HEENT, cardiovascular, pulmonary, GI, , musculoskeletal, neuro, skin, endocrine and psych systems  "are negative, except as otherwise noted.    OBJECTIVE:   BP (!) 171/76   Pulse 60   Temp 97.7  F (36.5  C) (Temporal)   Resp 16   Ht 1.676 m (5' 6\")   Wt 86.2 kg (190 lb)   SpO2 98%   BMI 30.67 kg/m   Estimated body mass index is 30.67 kg/m  as calculated from the following:    Height as of this encounter: 1.676 m (5' 6\").    Weight as of this encounter: 86.2 kg (190 lb).  Physical Exam  GENERAL: healthy, alert and no distress  EYES: Eyes grossly normal to inspection, PERRL and conjunctivae and sclerae normal  HENT: ear canals and TM's normal, nose and mouth without ulcers or lesions  NECK: no adenopathy, no asymmetry, masses, or scars and thyroid normal to palpation  RESP: lungs clear to auscultation - no rales, rhonchi or wheezes  CV: regular rate and rhythm, normal S1 S2, no S3 or S4, no murmur, click or rub, no peripheral edema and peripheral pulses strong  ABDOMEN: soft, nontender, no hepatosplenomegaly, no masses and bowel sounds normal  MS: no gross musculoskeletal defects noted, no edema  SKIN: no suspicious lesions or rashes  NEURO: Normal strength and tone, mentation intact and speech normal  PSYCH: mentation appears normal, affect normal/bright    Diagnostic Test Results:  Labs reviewed in Epic    ASSESSMENT / PLAN:   Nicky was seen today for physical and imm/inj.    Diagnoses and all orders for this visit:    Routine history and physical examination of adult    High priority for 2019-nCoV vaccine  -     COVID-19,PF,PFIZER    RICHIE (obstructive sleep apnea)    ILD (interstitial lung disease) (H)  Comments:  Pulse ox was 98% on room air, asymptomatic; no dyspnea with exertion    Hyperlipidemia LDL goal <100  -     Lipid panel reflex to direct LDL Fasting    Hypertension, unspecified type  -     CBC with platelets  -     Comprehensive metabolic panel (BMP + Alb, Alk Phos, ALT, AST, Total. Bili, TP)  -     Albumin Random Urine Quantitative with Creat Ratio; Future  -     Albumin Random Urine " "Quantitative with Creat Ratio    Abdominal aortic aneurysm (AAA) without rupture (H)    Screening for prostate cancer  -     PSA, screen    Lower urinary obstructive symptom  -     tamsulosin (FLOMAX) 0.4 MG capsule; Take 1 capsule (0.4 mg) by mouth daily    CTA mild dilated mid ascending aorta, asymptomatic, no CP.    W[place] IN [place]    Continue monitor blood pressure and send us readings, he states his blood pressure at home is 128-133 systolic.  Continue with lifestyle changes of brisk walking 30 minutes 5 times a week.  He does walk daily between 45 minutes to 1-1/2-hour in the summertime and wintertime he gets shorter walks.  Denies any claudication, chest pain, shortness of breath or dyspnea with exertion.  He has some obstructive urinary symptoms; he has nocturia x3, feels some weak void stream, will start him on Flomax daily.  Advised him that there is no need for prostate cancer screening; last PSA will be checked this year.   follow-up for Td vaccine in 4 weeks.  Follow-up in 3 months and as needed, he had a CT angiogram of his chest that showed stable mid, and mild but dilated ascending aorta.  He has seen cardiology also history for follow-up for an echo.    Patient has been advised of split billing requirements and indicates understanding: Yes  COUNSELING:  Reviewed preventive health counseling, as reflected in patient instructions       Regular exercise       Healthy diet/nutrition       Vision screening       Hearing screening       Dental care       Bladder control       Fall risk prevention       Aspirin prophylaxis        Alcohol Use        Colon cancer screening    Estimated body mass index is 30.67 kg/m  as calculated from the following:    Height as of this encounter: 1.676 m (5' 6\").    Weight as of this encounter: 86.2 kg (190 lb).    Weight management plan: Patient was referred to their PCP to discuss a diet and exercise plan.    He reports that he quit smoking about 6 years ago. His smoking " use included pipe. He smoked 0.00 packs per day for 30.00 years. He has never used smokeless tobacco.      Appropriate preventive services were discussed with this patient, including applicable screening as appropriate for cardiovascular disease, diabetes, osteopenia/osteoporosis, and glaucoma.  As appropriate for age/gender, discussed screening for colorectal cancer, prostate cancer, breast cancer, and cervical cancer. Checklist reviewing preventive services available has been given to the patient.    Reviewed patients plan of care and provided an AVS. The Basic Care Plan (routine screening as documented in Health Maintenance) for Sahip meets the Care Plan requirement. This Care Plan has been established and reviewed with the Patient.    Counseling Resources:  ATP IV Guidelines  Pooled Cohorts Equation Calculator  Breast Cancer Risk Calculator  Breast Cancer: Medication to Reduce Risk  FRAX Risk Assessment  ICSI Preventive Guidelines  Dietary Guidelines for Americans, 2010  USDA's MyPlate  ASA Prophylaxis  Lung CA Screening    Tom Oneal MD  [facility]    Identified Health Risks:

## 2021-10-23 LAB
ALBUMIN SERPL-MCNC: 3.9 G/DL (ref 3.4–5)
ALP SERPL-CCNC: 86 U/L (ref 40–150)
ALT SERPL W P-5'-P-CCNC: 35 U/L (ref 0–70)
ANION GAP SERPL CALCULATED.3IONS-SCNC: 7 MMOL/L (ref 3–14)
AST SERPL W P-5'-P-CCNC: 28 U/L (ref 0–45)
BILIRUB SERPL-MCNC: 0.6 MG/DL (ref 0.2–1.3)
BUN SERPL-MCNC: 10 MG/DL (ref 7–30)
CALCIUM SERPL-MCNC: 9.5 MG/DL (ref 8.5–10.1)
CHLORIDE BLD-SCNC: 104 MMOL/L (ref 94–109)
CHOLEST SERPL-MCNC: 143 MG/DL
CO2 SERPL-SCNC: 26 MMOL/L (ref 20–32)
CREAT SERPL-MCNC: 0.92 MG/DL (ref 0.66–1.25)
FASTING STATUS PATIENT QL REPORTED: YES
GFR SERPL CREATININE-BSD FRML MDRD: 78 ML/MIN/1.73M2
GLUCOSE BLD-MCNC: 94 MG/DL (ref 70–99)
HDLC SERPL-MCNC: 44 MG/DL
LDLC SERPL CALC-MCNC: 73 MG/DL
NONHDLC SERPL-MCNC: 99 MG/DL
POTASSIUM BLD-SCNC: 4.1 MMOL/L (ref 3.4–5.3)
PROT SERPL-MCNC: 7.6 G/DL (ref 6.8–8.8)
PSA SERPL-MCNC: 1.15 UG/L (ref 0–4)
SODIUM SERPL-SCNC: 137 MMOL/L (ref 133–144)
TRIGL SERPL-MCNC: 129 MG/DL

## 2021-10-25 LAB
CREAT UR-MCNC: 88 MG/DL
MICROALBUMIN UR-MCNC: 9 MG/L
MICROALBUMIN/CREAT UR: 10.23 MG/G CR (ref 0–17)

## 2022-02-15 ENCOUNTER — TELEPHONE (OUTPATIENT)
Dept: CARDIOLOGY | Facility: CLINIC | Age: 82
End: 2022-02-15
Payer: MEDICARE

## 2022-02-15 DIAGNOSIS — G47.33 OBSTRUCTIVE SLEEP APNEA: Primary | ICD-10-CM

## 2022-02-15 NOTE — TELEPHONE ENCOUNTER
[facility] Call Center    Phone Message    May a detailed message be left on voicemail: yes     Reason for Call: Order(s): Other:   Reason for requested: Pt wants to schedule his CTA and there is no order, There is an echo order, but it needs to be extended because echos are out past Feb 22 when the order expires. Pt also needs to f/u with Dr. Connolly, but pt says he is to have the CTA first--so where does the echo fit in?    Date needed: soon    Pt is not sure what and when to schedule    Provider name:Mohsen      Action Taken: routed to [email or web address]    Travel Screening: Not Applicable

## 2022-02-15 NOTE — TELEPHONE ENCOUNTER
Called and spoke with pt, discussed that Dr. Connolly ordered an echocardiogram this year and not a CTA. Orders for follow up and echo extended. Requested pt call scheduling to make appointments.

## 2022-02-24 ENCOUNTER — MYC REFILL (OUTPATIENT)
Dept: FAMILY MEDICINE | Facility: CLINIC | Age: 82
End: 2022-02-24
Payer: MEDICARE

## 2022-02-24 DIAGNOSIS — N13.9 LOWER URINARY OBSTRUCTIVE SYMPTOM: ICD-10-CM

## 2022-02-25 RX ORDER — TAMSULOSIN HYDROCHLORIDE 0.4 MG/1
0.4 CAPSULE ORAL DAILY
Qty: 90 CAPSULE | Refills: 0 | Status: SHIPPED | OUTPATIENT
Start: 2022-02-25 | End: 2022-06-05

## 2022-04-01 DIAGNOSIS — I10 HYPERTENSION, UNSPECIFIED TYPE: ICD-10-CM

## 2022-04-05 RX ORDER — LOSARTAN POTASSIUM 50 MG/1
50 TABLET ORAL DAILY
Qty: 60 TABLET | Refills: 0 | Status: SHIPPED | OUTPATIENT
Start: 2022-04-05 | End: 2022-06-05

## 2022-04-06 ENCOUNTER — HOSPITAL ENCOUNTER (OUTPATIENT)
Dept: CARDIOLOGY | Facility: CLINIC | Age: 82
Discharge: HOME | End: 2022-04-06
Attending: INTERNAL MEDICINE | Admitting: INTERNAL MEDICINE
Payer: MEDICARE

## 2022-04-06 DIAGNOSIS — I77.810 ASCENDING AORTA DILATATION (H): ICD-10-CM

## 2022-04-06 LAB — LVEF ECHO: NORMAL

## 2022-04-06 PROCEDURE — 93306 TTE W/DOPPLER COMPLETE: CPT | Mod: 26 | Performed by: INTERNAL MEDICINE

## 2022-04-06 PROCEDURE — 93306 TTE W/DOPPLER COMPLETE: CPT

## 2022-04-08 DIAGNOSIS — E78.5 HYPERLIPIDEMIA LDL GOAL <100: ICD-10-CM

## 2022-04-11 RX ORDER — ATORVASTATIN CALCIUM 20 MG/1
20 TABLET, FILM COATED ORAL DAILY
Qty: 90 TABLET | Refills: 0 | Status: SHIPPED | OUTPATIENT
Start: 2022-04-11 | End: 2022-06-05

## 2022-04-11 NOTE — TELEPHONE ENCOUNTER
Acute Nausea and Vomiting in Children   WHAT YOU NEED TO KNOW:   Some children, including babies, vomit for unknown reasons  Some common reasons for vomiting include gastroesophageal reflux or infection of the stomach, intestines, or urinary tract  DISCHARGE INSTRUCTIONS:   Return to the emergency department if:   · Your child has a seizure  · Your child's vomit contains blood or bile (green substance), or it looks like it has coffee grounds in it  · Your child is irritable and has a stiff neck and headache  · Your child has severe abdominal pain  · Your child says it hurts to urinate, or cries when he urinates  · Your child does not have energy, and is hard to wake up  · Your child has signs of dehydration such as a dry mouth, crying without tears, or urinating less than usual   Contact your child's healthcare provider if:   · Your baby has projectile (forceful, shooting) vomiting after a feeding  · Your child's fever increases or does not improve  · Your child begins to vomit more frequently  · Your child cannot keep any fluids down  · Your child's abdomen is hard and bloated  · You have questions or concerns about your child's condition or care  Medicines: Your child may need any of the following:  · Antinausea medicine  calms your child's stomach and controls vomiting  · Give your child's medicine as directed  Contact your child's healthcare provider if you think the medicine is not working as expected  Tell him or her if your child is allergic to any medicine  Keep a current list of the medicines, vitamins, and herbs your child takes  Include the amounts, and when, how, and why they are taken  Bring the list or the medicines in their containers to follow-up visits  Carry your child's medicine list with you in case of an emergency    Follow up with your child's healthcare provider in 1 to 2 days:  Write down your questions so you remember to ask them during your Prescription approved per [facility] Refill Protocol.  Kate NUNO, Triage RN  [facility]      child's visits  Liquids:  Give your child liquids as directed  Ask how much liquid your child should drink each day and which liquids are best  Children under 3year old should continue drinking breast milk and formula  Your child's healthcare provider may recommend a clear liquid diet for children older than 3year old  Examples of clear liquids include water, diluted juice, broth, and gelatin  Oral rehydration solution: An oral rehydration solution, or ORS, contains water, salts, and sugar that are needed to replace lost body fluids  Ask what kind of ORS to use, how much to give your child, and where to get it  © 2017 [employer]  Information is for End User's use only and may not be sold, redistributed or otherwise used for commercial purposes  All illustrations and images included in CareNotes® are the copyrighted property of A D A M , Inc  or John Fernández  The above information is an  only  It is not intended as medical advice for individual conditions or treatments  Talk to your doctor, nurse or pharmacist before following any medical regimen to see if it is safe and effective for you

## 2022-04-14 ENCOUNTER — OFFICE VISIT (OUTPATIENT)
Dept: CARDIOLOGY | Facility: CLINIC | Age: 82
End: 2022-04-14
Attending: INTERNAL MEDICINE
Payer: MEDICARE

## 2022-04-14 VITALS
HEIGHT: 66 IN | HEART RATE: 66 BPM | SYSTOLIC BLOOD PRESSURE: 153 MMHG | OXYGEN SATURATION: 97 % | WEIGHT: 192 LBS | DIASTOLIC BLOOD PRESSURE: 89 MMHG | BODY MASS INDEX: 30.86 KG/M2

## 2022-04-14 DIAGNOSIS — I10 ESSENTIAL HYPERTENSION: Primary | ICD-10-CM

## 2022-04-14 DIAGNOSIS — I77.810 ASCENDING AORTA DILATATION (H): ICD-10-CM

## 2022-04-14 DIAGNOSIS — E78.2 MIXED HYPERLIPIDEMIA: ICD-10-CM

## 2022-04-14 PROCEDURE — 99214 OFFICE O/P EST MOD 30 MIN: CPT | Performed by: INTERNAL MEDICINE

## 2022-04-14 NOTE — PROGRESS NOTES
CARDIOLOGY CLINIC FOLLOW-UP VISIT      REASON FOR VISIT:   Follow-up thoracic aortic aneurysm    PRIMARY CARE PHYSICIAN:  Tom Oneal        History of Present Illness   Nicky Hernández is an extremely pleasant 81 year old male with a history of hypertension, hyperlipidemia, and thoracic aortic aneurysm, here for routine follow-up.      Since his last visit with me in 2/2021, he reports that he has been doing very well with no cardiac complaints.  No chest pains, shortness of breath, lower extremity swelling, palpitations, lightheadedness, or any other complaints.  No recent hospitalizations.    His blood pressure is elevated today at 153/89, but he tells me that at home his blood pressures are in the low 120s to at most the low 130s systolic.  His last lipid panel was from 10/22/2021, and showed a total cholesterol of 143, HDL of 44, LDL of 73, and triglycerides of 129.  His initial TTE from 11/2020 showed a thoracic aorta measuring 4.3 cm.  This was followed with a CT of the chest measuring a maximal diameter of 3.7 cm.  Repeat TTE from 4/6/2022 showed a maximal diameter of 4.2 cm, a mildly dilated RV, and no other significant abnormalities.      Assessment & Plan     1. Ascending thoracic aortic aneurysm (4.2 cm on 4/2022 TTE; TTE measures approximately 0.6 cm greater than corresponding CTA)  2. Hypertension, well controlled in the 120s over 70s at home  3. Hyperlipidemia, well controlled  4. Former tobacco abuse      -Plan for repeat TTE in 2024 to follow aortic aneurysm  -Given good blood pressure control at home, will continue with losartan 50 mg daily.  I did encourage him to bring his blood pressure cuff into his next clinic visit here or with his PCP to confirm accuracy.  -Reiterated importance of avoiding heavy lifting, etc. requiring the Valsalva maneuver which could increase intrathoracic pressure.  -Frequency of aortic imaging pending results of above.  -Continue Lipitor 20 mg daily      Follow-up: 1  year, or sooner as needed        Justo Connolly MD  Interventional Cardiology  April 14, 2022        Medications   Current Outpatient Medications   Medication     aspirin (ASA) 81 MG tablet     atorvastatin (LIPITOR) 20 MG tablet     cyanocobalamin (VITAMIN B-12) 100 MCG tablet     losartan (COZAAR) 50 MG tablet     tamsulosin (FLOMAX) 0.4 MG capsule     Vitamin D3 (CHOLECALCIFEROL) 125 MCG (5000 UT) tablet     No current facility-administered medications for this visit.     Allergies   No Known Allergies      Physical Exam       BP: (!) 153/89 Pulse: 66     SpO2: 97 %      Vital Signs with Ranges  Pulse:  [66] 66  BP: (153)/(89) 153/89  SpO2:  [97 %] 97 %  192 lbs 0 oz    Constitutional: Well-appearing, no acute distress  Respiratory: Normal respiratory effort, CTAB  Cardiovascular: RRR, no m/r/g.  JVP < 7 cm H2O.  There is no LE edema.  Normal carotid upstrokes, no carotid bruits.

## 2022-04-14 NOTE — LETTER
4/14/2022    Tom Oneal MD  [address]  [place] [address]    RE: Nicky Hernández       Dear Colleague,     I had the pleasure of seeing Nicky Hernández in the [facility].  CARDIOLOGY CLINIC FOLLOW-UP VISIT      REASON FOR VISIT:   Follow-up thoracic aortic aneurysm    PRIMARY CARE PHYSICIAN:  Tom Oneal        History of Present Illness   Nicky Hernández is an extremely pleasant 81 year old male with a history of hypertension, hyperlipidemia, and thoracic aortic aneurysm, here for routine follow-up.      Since his last visit with me in 2/2021, he reports that he has been doing very well with no cardiac complaints.  No chest pains, shortness of breath, lower extremity swelling, palpitations, lightheadedness, or any other complaints.  No recent hospitalizations.    His blood pressure is elevated today at 153/89, but he tells me that at home his blood pressures are in the low 120s to at most the low 130s systolic.  His last lipid panel was from 10/22/2021, and showed a total cholesterol of 143, HDL of 44, LDL of 73, and triglycerides of 129.  His initial TTE from 11/2020 showed a thoracic aorta measuring 4.3 cm.  This was followed with a CT of the chest measuring a maximal diameter of 3.7 cm.  Repeat TTE from 4/6/2022 showed a maximal diameter of 4.2 cm, a mildly dilated RV, and no other significant abnormalities.      Assessment & Plan     1. Ascending thoracic aortic aneurysm (4.2 cm on 4/2022 TTE; TTE measures approximately 0.6 cm greater than corresponding CTA)  2. Hypertension, well controlled in the 120s over 70s at home  3. Hyperlipidemia, well controlled  4. Former tobacco abuse      -Plan for repeat TTE in 2024 to follow aortic aneurysm  -Given good blood pressure control at home, will continue with losartan 50 mg daily.  I did encourage him to bring his blood pressure cuff into his next clinic visit here or with his PCP to confirm accuracy.  -Reiterated importance of  avoiding heavy lifting, etc. requiring the Valsalva maneuver which could increase intrathoracic pressure.  -Frequency of aortic imaging pending results of above.  -Continue Lipitor 20 mg daily      Follow-up: 1 year, or sooner as needed        Justo Connolly MD  Interventional Cardiology  April 14, 2022        Medications   Current Outpatient Medications   Medication     aspirin (ASA) 81 MG tablet     atorvastatin (LIPITOR) 20 MG tablet     cyanocobalamin (VITAMIN B-12) 100 MCG tablet     losartan (COZAAR) 50 MG tablet     tamsulosin (FLOMAX) 0.4 MG capsule     Vitamin D3 (CHOLECALCIFEROL) 125 MCG (5000 UT) tablet     No current facility-administered medications for this visit.     Allergies   No Known Allergies      Physical Exam       BP: (!) 153/89 Pulse: 66     SpO2: 97 %      Vital Signs with Ranges  Pulse:  [66] 66  BP: (153)/(89) 153/89  SpO2:  [97 %] 97 %  192 lbs 0 oz    Constitutional: Well-appearing, no acute distress  Respiratory: Normal respiratory effort, CTAB  Cardiovascular: RRR, no m/r/g.  JVP < 7 cm H2O.  There is no LE edema.  Normal carotid upstrokes, no carotid bruits.      Thank you for allowing me to participate in the care of your patient.      Sincerely,     Justo Connolly MD     [facility]  cc:   Justo Connolly MD  [id number] [id number]

## 2022-06-14 NOTE — PROGRESS NOTES
CPAP Follow-Up Visit:    Date on this visit: 6/14/2022    Nicky Hernández has a follow-up visit today for management of previously diagnosed RICHIE. His medical history is significant for HTN, AAA, interstitial lung disease (2015 dx at [place]).      He had a sleep study in [facility] in 2005.    Previous Study Results: [facility] home test. The report was obtained but not an interpretation.  Date: 5/16/2005.  Weight not reported.  AHI: 43.5/hr (14.9/hr mixed apnea, 3.3/hr central apnea, 13.4/hr obstructive, 11.9/hr hypopnea). O2 maria de jesus 86%.     A sleep study was repeated to get him qualified for new CPAP equipment as his initial study was missing parts of the documentation.  PSG results 9/30/2020:  AHI was 48.5/hr (54% centrals), with desaturations down to 78%. He spent 3 minutes below 90% SpO2 and 2.4 minutes below 89% SpO2. RDI 61.3/hr.  REM RDI 53.3/hr.  Supine RDI 61.3/hr. He did not sleep laterally. Periodic Limb Movement Index 0/hour, 0/hr associated with arousals    He has no concerns. He is comfortable with the pressures. He is happy with it.    PAP machine: Respironics Dreamstation 2. Pressure settings: 6-10 cm    The compliance data shows that the patient used the CPAP for 29/30 nights, 96.7% of nights for >4 hours.  The 90th% pressure is 10 cm.  The average time in large leak is 0.  The average nightly usage is 8:02.  The average AHI is 4.6/hr.        Interface:  Mask: [id number] mask  Chin strap: No  Leak: No  Using Humidifier: was not using water when in [place], but does use water here. The heater is off.  Condensation in hose or mask: Yes/No     Difficulties with therapy:    [-] Snoring with CPAP:  [-] Difficulty tolerating the pressure:  [-] Epistaxis/dry nose:   [-] Nasal congestion:  [+] Dry mouth: not most days  [-] Mouth breathing:   [-] Pain/skin breakdown:      Improvements noted with CPAP:   [+] waking up more refreshed  [+] sleeping better with less arousals  [+] nocturia improved   [+] improved  energy level during the day    Weight change since sleep study: not recorded    Bedtime: 11 PM.  Wake time: 7:30 AM. Falls asleep in 15-20 minutes. Wakes 0-3 times per night for 5-10 minutes. Reason for waking: restroom  Naps: 0 times per week.       Past medical/surgical history, family history, social history, medications and allergies were reviewed.      Problem List:  Patient Active Problem List    Diagnosis Date Noted     Hypertension, unspecified type 10/12/2019     Priority: Medium     Abdominal aortic aneurysm (AAA) without rupture (H) 10/12/2019     Priority: Medium     Skin lesion 10/12/2019     Priority: Medium     RICHIE (obstructive sleep apnea) 10/12/2019     Priority: Medium     Hyperlipidemia LDL goal <100 10/12/2019     Priority: Medium     ILD (interstitial lung disease) (H) 10/12/2019     Priority: Medium     Simple chronic bronchitis (H) 10/12/2019     Priority: Medium     Exposure to asbestos 10/12/2019     Priority: Medium        Impression/Plan:    (G47.33) RICHIE (obstructive sleep apnea)  (primary encounter diagnosis)  Comment: Mr. Hernández is doing well with CPAP. He has been faithfully using it for 17 years. He benefits from use. His AHI and leak are within goal ranges. There is evidence of a little mouth leak, but he does not have any significantly bothersome dry mouth. He is happy with the machine and his sleep.  Plan: Comprehensive DME        Continue auto CPAP 6-10 cm. A prescription was written for new supplies. We reviewed recommendations for cleaning and replacing supplies.        He will follow up with me in about 1 year(s).     Phone visit duration: 16 min     Bennett Goltz, PA-C    CC: No ref. provider found

## 2022-09-13 DIAGNOSIS — I10 HYPERTENSION, UNSPECIFIED TYPE: ICD-10-CM

## 2022-09-15 ASSESSMENT — SLEEP AND FATIGUE QUESTIONNAIRES
HOW LIKELY ARE YOU TO NOD OFF OR FALL ASLEEP WHILE WATCHING TV: WOULD NEVER DOZE
HOW LIKELY ARE YOU TO NOD OFF OR FALL ASLEEP WHILE LYING DOWN TO REST IN THE AFTERNOON WHEN CIRCUMSTANCES PERMIT: SLIGHT CHANCE OF DOZING
HOW LIKELY ARE YOU TO NOD OFF OR FALL ASLEEP WHILE SITTING INACTIVE IN A PUBLIC PLACE: WOULD NEVER DOZE
HOW LIKELY ARE YOU TO NOD OFF OR FALL ASLEEP WHILE SITTING AND READING: WOULD NEVER DOZE
HOW LIKELY ARE YOU TO NOD OFF OR FALL ASLEEP WHEN YOU ARE A PASSENGER IN A CAR FOR AN HOUR WITHOUT A BREAK: WOULD NEVER DOZE
HOW LIKELY ARE YOU TO NOD OFF OR FALL ASLEEP IN A CAR, WHILE STOPPED FOR A FEW MINUTES IN TRAFFIC: WOULD NEVER DOZE
HOW LIKELY ARE YOU TO NOD OFF OR FALL ASLEEP WHILE SITTING QUIETLY AFTER LUNCH WITHOUT ALCOHOL: WOULD NEVER DOZE
HOW LIKELY ARE YOU TO NOD OFF OR FALL ASLEEP WHILE SITTING AND TALKING TO SOMEONE: WOULD NEVER DOZE

## 2022-09-16 ENCOUNTER — VIRTUAL VISIT (OUTPATIENT)
Dept: SLEEP MEDICINE | Facility: CLINIC | Age: 82
End: 2022-09-16
Payer: MEDICARE

## 2022-09-16 DIAGNOSIS — G47.33 OSA (OBSTRUCTIVE SLEEP APNEA): Primary | ICD-10-CM

## 2022-09-16 PROCEDURE — 99442 PR PHYSICIAN TELEPHONE EVALUATION 11-20 MIN: CPT | Performed by: PHYSICIAN ASSISTANT

## 2022-09-16 RX ORDER — LOSARTAN POTASSIUM 50 MG/1
TABLET ORAL
Qty: 60 TABLET | Refills: 0 | Status: SHIPPED | OUTPATIENT
Start: 2022-09-16 | End: 2022-11-21

## 2022-09-16 NOTE — PROGRESS NOTES
"Nicky Hernnádez is a 82 year old male being evaluated via a billable telephone visit.     \"This telephone visit will be conducted via a call between you and your physician/provider. We have found that certain health care needs can be provided without the need for an in-person visit or physical exam.  This service lets us provide the care you need with a telephone conversation.  If a prescription is necessary we can send it directly to your pharmacy.  If lab work is needed we can place an order for that and you can then stop by our lab to have the test done at a later time.\"    Telephone visits are billed at different rates depending on your insurance coverage.  Please reach out to your insurance provider with any questions.    Patient has given verbal consent for  a Telephone visit? Yes    What telephone number would you like your provider to contact at at:  [phone]      How would you like to obtain your AVS? Pop Travis    Telephone Visit Details:     Telephone Visit Start Time: 3:33 PM    Telephone Visit End Time:3:49 PM      "

## 2022-09-16 NOTE — TELEPHONE ENCOUNTER
Routing refill request to provider for review/approval because:  BP Readings from Last 3 Encounters:   04/14/22 (!) 153/89   10/22/21 (!) 171/76   03/30/21 139/75     Asia PRECIADO RN  [facility]

## 2022-09-18 ENCOUNTER — HEALTH MAINTENANCE LETTER (OUTPATIENT)
Age: 82
End: 2022-09-18

## 2022-10-12 ENCOUNTER — NURSE TRIAGE (OUTPATIENT)
Dept: NURSING | Facility: CLINIC | Age: 82
End: 2022-10-12

## 2022-10-12 NOTE — TELEPHONE ENCOUNTER
Nurse Triage SBAR    Is this a 2nd Level Triage? NO    Situation:  Patient had injury to left middle finger 6 weeks ago.  -Left middle finger is bent > crooked-to the side approx 45 degrees.  -Used splint 45 days  Traveling for 30 days starting tomorrow  - the finger feels no pain unless pressure applied to  finger  -Can bend / use to 90 degrees without pain  -no open areas, warmth or redness      Background: LEFT middle finger injury    Assessment:  Patient with left middle finger injury occurred 6 weeks ago.   He splinted it himself.  He is traveling for 30 days and wished to know if he should be seen prior to travel    Protocol Recommended Disposition: /  Recommendation:  GO TO [facility] NOW           Patient with left middle finger injury occurred 6 weeks ago.   He splinted it himself.  He is traveling for 30 days and wished to know if he should be seen prior to travel.  He was advised to have finger evaluated, but that his symptoms( no pain unless pressure on bump, crooked finger) were not acute. He may benefit from Orthopedic advice in future and baseline assessment now   Urgent care    Does the patient meet one of the following criteria for ADS visit consideration? No    Reason for Disposition    Looks like a broken bone or dislocated joint (e.g., crooked or deformed)    Additional Information    Negative: Major bleeding (actively dripping or spurting) that can't be stopped    Negative: Sounds like a life-threatening emergency to the triager    Negative: Wound looks infected    Negative: Caused by animal bite    Negative: Amputation    Negative: High-pressure injection injury (e.g., from paint gun, usually work-related)    Protocols used: FINGER INJURY-A-OH

## 2022-11-18 ASSESSMENT — ENCOUNTER SYMPTOMS
HEMATURIA: 0
COUGH: 0
FEVER: 0
ARTHRALGIAS: 0
HEARTBURN: 0
PARESTHESIAS: 0
WEAKNESS: 0
NERVOUS/ANXIOUS: 0
SHORTNESS OF BREATH: 0
ABDOMINAL PAIN: 0
NAUSEA: 0
HEMATOCHEZIA: 0
CONSTIPATION: 0
DIARRHEA: 0
SORE THROAT: 0
CHILLS: 0
EYE PAIN: 0
JOINT SWELLING: 0
DYSURIA: 0
PALPITATIONS: 0
MYALGIAS: 0
DIZZINESS: 0
HEADACHES: 0
FREQUENCY: 1

## 2022-11-18 ASSESSMENT — ACTIVITIES OF DAILY LIVING (ADL): CURRENT_FUNCTION: NO ASSISTANCE NEEDED

## 2022-11-21 ENCOUNTER — OFFICE VISIT (OUTPATIENT)
Dept: FAMILY MEDICINE | Facility: CLINIC | Age: 82
End: 2022-11-21
Payer: MEDICARE

## 2022-11-21 VITALS
SYSTOLIC BLOOD PRESSURE: 134 MMHG | TEMPERATURE: 97.7 F | DIASTOLIC BLOOD PRESSURE: 68 MMHG | HEART RATE: 67 BPM | HEIGHT: 65 IN | BODY MASS INDEX: 30.66 KG/M2 | WEIGHT: 184 LBS | OXYGEN SATURATION: 96 %

## 2022-11-21 DIAGNOSIS — N13.9 LOWER URINARY OBSTRUCTIVE SYMPTOM: ICD-10-CM

## 2022-11-21 DIAGNOSIS — J84.9 ILD (INTERSTITIAL LUNG DISEASE) (H): ICD-10-CM

## 2022-11-21 DIAGNOSIS — I77.810 ASCENDING AORTA DILATION (H): ICD-10-CM

## 2022-11-21 DIAGNOSIS — Z12.5 SCREENING FOR PROSTATE CANCER: ICD-10-CM

## 2022-11-21 DIAGNOSIS — E78.5 HYPERLIPIDEMIA LDL GOAL <100: ICD-10-CM

## 2022-11-21 DIAGNOSIS — I10 PRIMARY HYPERTENSION: ICD-10-CM

## 2022-11-21 DIAGNOSIS — E66.09 CLASS 1 OBESITY DUE TO EXCESS CALORIES WITH SERIOUS COMORBIDITY AND BODY MASS INDEX (BMI) OF 30.0 TO 30.9 IN ADULT: ICD-10-CM

## 2022-11-21 DIAGNOSIS — G47.33 OSA (OBSTRUCTIVE SLEEP APNEA): ICD-10-CM

## 2022-11-21 DIAGNOSIS — I25.10 CORONARY ARTERY CALCIFICATION SEEN ON CT SCAN: ICD-10-CM

## 2022-11-21 DIAGNOSIS — Z00.00 ENCOUNTER FOR MEDICARE ANNUAL WELLNESS EXAM: Primary | ICD-10-CM

## 2022-11-21 DIAGNOSIS — R73.01 IFG (IMPAIRED FASTING GLUCOSE): ICD-10-CM

## 2022-11-21 DIAGNOSIS — E66.811 CLASS 1 OBESITY DUE TO EXCESS CALORIES WITH SERIOUS COMORBIDITY AND BODY MASS INDEX (BMI) OF 30.0 TO 30.9 IN ADULT: ICD-10-CM

## 2022-11-21 PROBLEM — J41.0 SIMPLE CHRONIC BRONCHITIS (H): Status: RESOLVED | Noted: 2019-10-12 | Resolved: 2022-11-21

## 2022-11-21 LAB
ANION GAP SERPL CALCULATED.3IONS-SCNC: 15 MMOL/L (ref 7–15)
BUN SERPL-MCNC: 11.9 MG/DL (ref 8–23)
CALCIUM SERPL-MCNC: 9.9 MG/DL (ref 8.8–10.2)
CHLORIDE SERPL-SCNC: 103 MMOL/L (ref 98–107)
CHOLEST SERPL-MCNC: 176 MG/DL
CREAT SERPL-MCNC: 0.84 MG/DL (ref 0.67–1.17)
DEPRECATED HCO3 PLAS-SCNC: 21 MMOL/L (ref 22–29)
ERYTHROCYTE [DISTWIDTH] IN BLOOD BY AUTOMATED COUNT: 13.4 % (ref 10–15)
GFR SERPL CREATININE-BSD FRML MDRD: 88 ML/MIN/1.73M2
GLUCOSE SERPL-MCNC: 134 MG/DL (ref 70–99)
HCT VFR BLD AUTO: 41 % (ref 40–53)
HDLC SERPL-MCNC: 39 MG/DL
HGB BLD-MCNC: 13.7 G/DL (ref 13.3–17.7)
LDLC SERPL CALC-MCNC: 87 MG/DL
MCH RBC QN AUTO: 29.9 PG (ref 26.5–33)
MCHC RBC AUTO-ENTMCNC: 33.4 G/DL (ref 31.5–36.5)
MCV RBC AUTO: 90 FL (ref 78–100)
NONHDLC SERPL-MCNC: 137 MG/DL
PLATELET # BLD AUTO: 173 10E3/UL (ref 150–450)
POTASSIUM SERPL-SCNC: 4.1 MMOL/L (ref 3.4–5.3)
PSA SERPL-MCNC: 1.15 NG/ML
RBC # BLD AUTO: 4.58 10E6/UL (ref 4.4–5.9)
SODIUM SERPL-SCNC: 139 MMOL/L (ref 136–145)
TRIGL SERPL-MCNC: 252 MG/DL
WBC # BLD AUTO: 7.1 10E3/UL (ref 4–11)

## 2022-11-21 PROCEDURE — G0103 PSA SCREENING: HCPCS | Performed by: PHYSICIAN ASSISTANT

## 2022-11-21 PROCEDURE — 85027 COMPLETE CBC AUTOMATED: CPT | Performed by: PHYSICIAN ASSISTANT

## 2022-11-21 PROCEDURE — 80048 BASIC METABOLIC PNL TOTAL CA: CPT | Performed by: PHYSICIAN ASSISTANT

## 2022-11-21 PROCEDURE — 83036 HEMOGLOBIN GLYCOSYLATED A1C: CPT | Performed by: PHYSICIAN ASSISTANT

## 2022-11-21 PROCEDURE — 99214 OFFICE O/P EST MOD 30 MIN: CPT | Mod: 25 | Performed by: PHYSICIAN ASSISTANT

## 2022-11-21 PROCEDURE — 80061 LIPID PANEL: CPT | Performed by: PHYSICIAN ASSISTANT

## 2022-11-21 PROCEDURE — 36415 COLL VENOUS BLD VENIPUNCTURE: CPT | Performed by: PHYSICIAN ASSISTANT

## 2022-11-21 PROCEDURE — G0439 PPPS, SUBSEQ VISIT: HCPCS | Performed by: PHYSICIAN ASSISTANT

## 2022-11-21 RX ORDER — LOSARTAN POTASSIUM 50 MG/1
50 TABLET ORAL DAILY
Qty: 90 TABLET | Refills: 3 | Status: SHIPPED | OUTPATIENT
Start: 2022-11-21 | End: 2023-12-28

## 2022-11-21 RX ORDER — ATORVASTATIN CALCIUM 20 MG/1
20 TABLET, FILM COATED ORAL DAILY
Qty: 90 TABLET | Refills: 3 | Status: SHIPPED | OUTPATIENT
Start: 2022-11-21 | End: 2023-12-28

## 2022-11-21 RX ORDER — TAMSULOSIN HYDROCHLORIDE 0.4 MG/1
0.4 CAPSULE ORAL EVERY OTHER DAY
Qty: 45 CAPSULE | Refills: 3 | Status: SHIPPED | OUTPATIENT
Start: 2022-11-21 | End: 2023-11-22

## 2022-11-21 ASSESSMENT — ENCOUNTER SYMPTOMS
ARTHRALGIAS: 0
COUGH: 0
HEMATURIA: 0
DIARRHEA: 0
SORE THROAT: 0
DYSURIA: 0
DIZZINESS: 0
WEAKNESS: 0
CHILLS: 0
SHORTNESS OF BREATH: 0
NAUSEA: 0
ABDOMINAL PAIN: 0
PARESTHESIAS: 0
MYALGIAS: 0
FREQUENCY: 1
PALPITATIONS: 0
CONSTIPATION: 0
HEARTBURN: 0
EYE PAIN: 0
FEVER: 0
HEMATOCHEZIA: 0
HEADACHES: 0
JOINT SWELLING: 0
NERVOUS/ANXIOUS: 0

## 2022-11-21 ASSESSMENT — PAIN SCALES - GENERAL: PAINLEVEL: NO PAIN (0)

## 2022-11-21 ASSESSMENT — ACTIVITIES OF DAILY LIVING (ADL): CURRENT_FUNCTION: NO ASSISTANCE NEEDED

## 2022-11-21 NOTE — PROGRESS NOTES
"SUBJECTIVE:   Nicky is a 82 year old who presents for Preventive Visit.  Patient has been advised of split billing requirements and indicates understanding: Yes  Are you in the first 12 months of your Medicare coverage?  No    Healthy Habits:     In general, how would you rate your overall health?  Good    Frequency of exercise:  1 day/week    Duration of exercise:  Less than 15 minutes    Do you usually eat at least 4 servings of fruit and vegetables a day, include whole grains    & fiber and avoid regularly eating high fat or \"junk\" foods?  Yes    Taking medications regularly:  Yes    Medication side effects:  None    Ability to successfully perform activities of daily living:  No assistance needed    Home Safety:  No safety concerns identified    Hearing Impairment:  No hearing concerns    In the past 6 months, have you been bothered by leaking of urine?  No    In general, how would you rate your overall mental or emotional health?  Excellent      PHQ-2 Total Score: 0    Additional concerns today:  No    Have you ever done Advance Care Planning? (For example, a Health Directive, POLST, or a discussion with a medical provider or your loved ones about your wishes): No, advance care planning information given to patient to review.  Patient declined advance care planning discussion at this time.    Fall risk  Fallen 2 or more times in the past year?: No  Any fall with injury in the past year?: No    Cognitive Screening - not completed    Mini-CogTM Copyright RENEE Rodriguez. Licensed by the author for use in [facility]; reprinted with permission (asim@.[facility]). All rights reserved.        Reviewed and updated as needed this visit by clinical staff   Tobacco  Allergies  Meds  Problems  Med Hx  Surg Hx  Fam Hx          Reviewed and updated as needed this visit by Provider   Tobacco  Allergies  Meds  Problems  Med Hx  Surg Hx  Fam Hx         Social History     Tobacco Use     Smoking status: Former "     Types: Pipe     Smokeless tobacco: Never     Tobacco comments:     pipe mainly stopped 5.5 yrs ago   Substance Use Topics     Alcohol use: Yes     Comment: Not regularly, occasional.       Alcohol Use 11/18/2022   Prescreen: >3 drinks/day or >7 drinks/week? No   Prescreen: >3 drinks/day or >7 drinks/week? -     - Patient decreased dose of losartan by half the past several days, concerned dose may be too high. SBP 120s-140s at home. Known ascending aorta dilation and essential HTN, along with severe coronary artery calcifications.  - Can he discontinue his statin since his lipid panel has been good?  - Compliant with CPAP for RICHIE, feels this helps his breathing significantly.    Current providers sharing in care for this patient include:   Patient Care Team:  Tom Oneal MD as PCP - General (Internal Medicine)  Tom Oneal MD as Assigned PCP  Tom Oneal MD (Internal Medicine)  Justo Connolly MD as Assigned Heart and Vascular Provider  Goltz, Bennett Ezra, PA-C as Assigned Neuroscience Provider    The following health maintenance items are reviewed in Epic and correct as of today:  Health Maintenance   Topic Date Due     COPD ACTION PLAN  Never done     ZOSTER IMMUNIZATION (1 of 2) Never done     DTAP/TDAP/TD IMMUNIZATION (2 - Tdap) 12/14/2021     MEDICARE ANNUAL WELLNESS VISIT  10/22/2022     ANNUAL REVIEW OF HM ORDERS  11/21/2023     FALL RISK ASSESSMENT  11/21/2023     ADVANCE CARE PLANNING  11/21/2027     PHQ-2 (once per calendar year)  Completed     INFLUENZA VACCINE  Completed     Pneumococcal Vaccine: 65+ Years  Completed     COVID-19 Vaccine  Completed     IPV IMMUNIZATION  Aged Out     MENINGITIS IMMUNIZATION  Aged Out     SPIROMETRY  Discontinued     BP Readings from Last 3 Encounters:   11/21/22 134/68   04/14/22 (!) 153/89   10/22/21 (!) 171/76    Wt Readings from Last 3 Encounters:   11/21/22 83.5 kg (184 lb)   04/14/22 87.1 kg (192 lb)   10/22/21 86.2 kg (190 lb)  "                 Patient Active Problem List   Diagnosis     Hypertension, unspecified type     Abdominal aortic aneurysm (AAA) without rupture     Skin lesion     RICHIE (obstructive sleep apnea)     Hyperlipidemia LDL goal <100     ILD (interstitial lung disease) (H)     Simple chronic bronchitis (H)     Exposure to asbestos     Past Surgical History:   Procedure Laterality Date     COLONOSCOPY  2017       Social History     Tobacco Use     Smoking status: Former     Types: Pipe     Smokeless tobacco: Never     Tobacco comments:     pipe mainly stopped 5.5 yrs ago   Substance Use Topics     Alcohol use: Yes     Comment: Not regularly, occasional.     Family History   Problem Relation Age of Onset     Lung Cancer Father            Review of Systems   Constitutional: Negative for chills and fever.   HENT: Negative for congestion, ear pain, hearing loss and sore throat.    Eyes: Negative for pain and visual disturbance.   Respiratory: Negative for cough and shortness of breath.    Cardiovascular: Negative for chest pain, palpitations and peripheral edema.   Gastrointestinal: Negative for abdominal pain, constipation, diarrhea, heartburn, hematochezia and nausea.   Genitourinary: Positive for frequency and impotence. Negative for dysuria, genital sores, hematuria, penile discharge and urgency.   Musculoskeletal: Negative for arthralgias, joint swelling and myalgias.   Skin: Negative for rash.   Neurological: Negative for dizziness, weakness, headaches and paresthesias.   Psychiatric/Behavioral: Negative for mood changes. The patient is not nervous/anxious.        OBJECTIVE:   /68   Pulse 67   Temp 97.7  F (36.5  C) (Temporal)   Ht 1.651 m (5' 5\")   Wt 83.5 kg (184 lb)   SpO2 96%   BMI 30.62 kg/m   Estimated body mass index is 30.62 kg/m  as calculated from the following:    Height as of this encounter: 1.651 m (5' 5\").    Weight as of this encounter: 83.5 kg (184 lb).  Physical Exam  GENERAL: alert and no " distress  EYES: Eyes grossly normal to inspection, PERRL and conjunctivae and sclerae normal  HENT: ear canals and TM's normal, nose and mouth without ulcers or lesions  NECK: no adenopathy, no asymmetry, masses, or scars and thyroid normal to palpation  RESP: lungs clear to auscultation - no rales, rhonchi or wheezes.  CV: regular rate and rhythm, normal S1 S2, no S3 or S4, no murmur, click or rub, no peripheral edema and peripheral pulses strong  MS: no gross musculoskeletal defects noted, no edema  SKIN: no suspicious lesions or rashes  NEURO: Normal strength and tone, mentation intact and speech normal  PSYCH: mentation appears normal, affect normal/bright      ASSESSMENT / PLAN:       ICD-10-CM    1. Encounter for Medicare annual wellness exam  Z00.00       2. Primary hypertension  I10 Basic metabolic panel  (Ca, Cl, CO2, Creat, Gluc, K, Na, BUN)     losartan (COZAAR) 50 MG tablet     Basic metabolic panel  (Ca, Cl, CO2, Creat, Gluc, K, Na, BUN)      3. Coronary artery calcification seen on CT scan  I25.10 CBC with platelets     Lipid panel reflex to direct LDL Fasting     atorvastatin (LIPITOR) 20 MG tablet     CBC with platelets     Lipid panel reflex to direct LDL Fasting      4. Ascending aorta dilation (H)  I77.810 CBC with platelets     CBC with platelets      5. ILD (interstitial lung disease) (H)  J84.9       6. RICHIE (obstructive sleep apnea)  G47.33       7. Lower urinary obstructive symptom  N13.9 tamsulosin (FLOMAX) 0.4 MG capsule      8. Hyperlipidemia LDL goal <100  E78.5 Lipid panel reflex to direct LDL Fasting     atorvastatin (LIPITOR) 20 MG tablet     Lipid panel reflex to direct LDL Fasting      9. Screening for prostate cancer  Z12.5 PSA, screen     PSA, screen        - BP initially elevated but came down upon recheck. Discussed goal SBP < 130/90 to help reduce risk for worsening aortic dilation and CAD; recommend continuing losartan at 50 mg every day as prescribed.  - discussed severe ASCVD  "on imaging, recommend continued use of statin and daily ASA to reduce risk of heart attack or stroke. Continue follow-up with cardiology as scheduled.  - ILD, RICHIE well-managed on current regimen, continue without change. Continue follow-up with sleep specialist as scheduled.  - Tamsulosin effective for LUTS management, continue without change.      COUNSELING:  Reviewed preventive health counseling, as reflected in patient instructions       Prostate cancer screening - ordered as requested           BMI:   Estimated body mass index is 30.62 kg/m  as calculated from the following:    Height as of this encounter: 1.651 m (5' 5\").    Weight as of this encounter: 83.5 kg (184 lb).   Weight management plan: Discussed healthy diet and exercise guidelines per patient instructions      He reports that he has quit smoking. His smoking use included pipe. He has never used smokeless tobacco.      Appropriate preventive services were discussed with this patient, including applicable screening as appropriate for cardiovascular disease, diabetes, osteopenia/osteoporosis, and glaucoma.  As appropriate for age/gender, discussed screening for colorectal cancer, prostate cancer, breast cancer, and cervical cancer. Checklist reviewing preventive services available has been given to the patient.    Reviewed patients plan of care and provided an AVS. The Intermediate Care Plan ( asthma action plan, low back pain action plan, and migraine action plan) for Sahip meets the Care Plan requirement. This Care Plan has been established and reviewed with the Patient and spouse.      Arcelia Alvarez PA-C  [facility]    Identified Health Risks:      He is at risk for lack of exercise and has been provided with information to increase physical activity for the benefit of his well-being.  "

## 2022-11-21 NOTE — PATIENT INSTRUCTIONS
Patient Education   Personalized Prevention Plan  You are due for the preventive services outlined below.  Your care team is available to assist you in scheduling these services.  If you have already completed any of these items, please share that information with your care team to update in your medical record.  Health Maintenance Due   Topic Date Due     ANNUAL REVIEW OF HM ORDERS  Never done     COPD Action Plan  Never done     Zoster (Shingles) Vaccine (1 of 2) Never done     Diptheria Tetanus Pertussis (DTAP/TDAP/TD) Vaccine (2 - Tdap) 12/14/2021     Annual Wellness Visit  10/22/2022     Preventive Health Recommendations  See your health care provider every year to    Review health changes.     Discuss preventive care.      Review your medicines if your doctor has prescribed any.    Talk with your health care provider about whether you should have a test to screen for prostate cancer (PSA).    Every 3 years, have a diabetes test (fasting glucose). If you are at risk for diabetes, you should have this test more often.    Every 5 years, have a cholesterol test. Have this test more often if you are at risk for high cholesterol or heart disease.     Every 10 years, have a colonoscopy. Or, have a yearly FIT test (stool test). These exams will check for colon cancer.    Talk to with your health care provider about screening for Abdominal Aortic Aneurysm if you have a family history of AAA or have a history of smoking.    Shots:     Get a flu shot each year.     Get a tetanus shot every 10 years.     Talk to your doctor about your pneumonia vaccines. There are now two you should receive - Pneumovax (PPSV 23) and Prevnar (PCV 13).    Talk to your pharmacist about a shingles vaccine.     Talk to your doctor about the hepatitis B vaccine.    Nutrition:     Eat at least 5 servings of fruits and vegetables each day.     Eat whole-grain bread, whole-wheat pasta and brown rice instead of white grains and rice.     Get  adequate Calcium and Vitamin D.     Lifestyle    Exercise for at least 150 minutes a week (30 minutes a day, 5 days a week). This will help you control your weight and prevent disease.     Limit alcohol to one drink per day.     No smoking.     Wear sunscreen to prevent skin cancer.     See your dentist every six months for an exam and cleaning.     See your eye doctor every 1 to 2 years to screen for conditions such as glaucoma, macular degeneration and cataracts.    Personalized Prevention Plan  You are due for the preventive services outlined below.  Your care team is available to assist you in scheduling these services.  If you have already completed any of these items, please share that information with your care team to update in your medical record.  Health Maintenance   Topic Date Due     ANNUAL REVIEW OF HM ORDERS  Never done     COPD ACTION PLAN  Never done     ZOSTER IMMUNIZATION (1 of 2) Never done     DTAP/TDAP/TD IMMUNIZATION (2 - Tdap) 12/14/2021     MEDICARE ANNUAL WELLNESS VISIT  10/22/2022     FALL RISK ASSESSMENT  11/21/2023     ADVANCE CARE PLANNING  11/21/2027     PHQ-2 (once per calendar year)  Completed     INFLUENZA VACCINE  Completed     Pneumococcal Vaccine: 65+ Years  Completed     COVID-19 Vaccine  Completed     IPV IMMUNIZATION  Aged Out     MENINGITIS IMMUNIZATION  Aged Out     SPIROMETRY  Discontinued       Exercise for a Healthier Heart  You may wonder how you can improve the health of your heart. If you re thinking about exercise, you re on the right track. You don t need to become an athlete. But you do need a certain amount of brisk exercise to help strengthen your heart. If you have been diagnosed with a heart condition, your healthcare provider may advise exercise to help stabilize your condition. To help make exercise a habit, choose safe, fun activities.      Exercise with a friend. When activity is fun, you're more likely to stick with it.   Before you start  Check with your  healthcare provider before starting an exercise program. This is especially important if you have not been active for a while. It's also important if you have a long-term (chronic) health problem such as heart disease, diabetes, or obesity. Or if you are at high risk for having these problems.   Why exercise?  Exercising regularly offers many healthy rewards. It can help you do all of the following:     Improve your blood cholesterol level to help prevent further heart trouble    Lower your blood pressure to help prevent a stroke or heart attack    Control diabetes, or reduce your risk of getting this disease    Improve your heart and lung function    Reach and stay at a healthy weight    Make your muscles stronger so you can stay active    Prevent falls and fractures by slowing the loss of bone mass (osteoporosis)    Manage stress better    Reduce your blood pressure    Improve your sense of self and your body image  Exercise tips      Ease into your routine. Set small goals. Then build on them. If you are not sure what your activity level should be, talk with your healthcare provider first before starting an exercise routine.    Exercise on most days. Aim for a total of 150 minutes (2 hours and 30 minutes) or more of moderate-intensity aerobic activity each week. Or 75 minutes (1 hour and 15 minutes) or more of vigorous-intensity aerobic activity each week. Or try for a combination of both. Moderate activity means that you breathe heavier and your heart rate increases but you can still talk. Think about doing 40 minutes of moderate exercise, 3 to 4 times a week. For best results, activity should last for about 40 minutes to lower blood pressure and cholesterol. It's OK to work up to the 40-minute period over time. Examples of moderate-intensity activity are walking 1 mile in 15 minutes. Or doing 30 to 45 minutes of yard work.    Step up your daily activity level.  Along with your exercise program, try being more  active the whole day. Walk instead of drive. Or park further away so that you take more steps each day. Do more household tasks or yard work. You may not be able to meet the advised mount of physical activity. But doing some moderate- or vigorous-intensity aerobic activity can help reduce your risk for heart disease. Your healthcare provider can help you figure out what is best for you.    Choose 1 or more activities you enjoy.  Walking is one of the easiest things you can do. You can also try swimming, riding a bike, dancing, or taking an exercise class.    When to call your healthcare provider  Call your healthcare provider if you have any of these:     Chest pain or feel dizzy or lightheaded    Burning, tightness, pressure, or heaviness in your chest, neck, shoulders, back, or arms    Abnormal shortness of breath    More joint or muscle pain    A very fast or irregular heartbeat (palpitations)  [employer] last reviewed this educational content on 7/1/2019 2000-2021 [employer]. All rights reserved. This information is not intended as a substitute for professional medical care. Always follow your healthcare professional's instructions.        Exercise: The Rewards of Fitness    Do you need to be convinced that exercise is a good idea? Exercise and fitness offer you all kinds of rewards. Think about your goals. Can exercise help you achieve some of them?     A Stronger and Happier You  Even before you see the difference, you will feel the difference. Here are three ways you will benefit from more activity:    Physical fitness. You ll have more stamina. You ll also enjoy recreation more. And you ll keep your strength and independence as you age.    Mental fitness. You ll manage stress better, be less tense, think more clearly, and maybe even sleep better.    Long-term health. Your risk of some diseases may go down. This includes heart disease, osteoporosis (thinning bones), some cancers, high blood  pressure, and diabetes.    Check Your Health First  If you answer  yes  to any of the questions below, you should talk to your doctor before beginning a fitness program:  1. Has a doctor ever said you have heart trouble?  2. Do you ever have chest pains?  3. Do you often feel faint or have dizzy spells?  4. Has a doctor ever said your blood pressure is too high?  5. Has a doctor ever said that you have a bone or joint problem that could be made worse by exercise?  6. Do you take any prescription medications for problems such as diabetes or asthma?      Will I Lose Weight?  Many of us would like to lose or keep off a few pounds. Being more active each day and building muscle can help. Here s how:    Being active burns calories. You burn nearly twice as many calories just walking slowly as you do sitting.    Muscle burns more calories than fat. So the more muscle you build up from activity, the more calories you burn.    If you add more muscle, you ll use more calories even when you re inactive.    Being active helps you retain more muscle as you age. More muscle means it ll be easier to control your weight.        5640-1227 [employer]. [address] [place] [address], [place] [address]. All rights reserved. This information is not intended as a substitute for professional medical care. Always follow your healthcare professional's instructions.

## 2022-11-22 LAB — HBA1C MFR BLD: 5.8 % (ref 0–5.6)

## 2022-11-23 PROBLEM — R73.01 IFG (IMPAIRED FASTING GLUCOSE): Status: ACTIVE | Noted: 2022-11-23

## 2022-12-07 ENCOUNTER — ANCILLARY PROCEDURE (OUTPATIENT)
Dept: GENERAL RADIOLOGY | Facility: CLINIC | Age: 82
End: 2022-12-07
Attending: FAMILY MEDICINE
Payer: MEDICARE

## 2022-12-07 ENCOUNTER — OFFICE VISIT (OUTPATIENT)
Dept: ORTHOPEDICS | Facility: CLINIC | Age: 82
End: 2022-12-07
Payer: MEDICARE

## 2022-12-07 VITALS — WEIGHT: 188.9 LBS | BODY MASS INDEX: 31.47 KG/M2 | HEIGHT: 65 IN

## 2022-12-07 DIAGNOSIS — S69.92XA INJURY OF FINGER OF LEFT HAND: ICD-10-CM

## 2022-12-07 DIAGNOSIS — M20.012 CLOSED FRACTURE OF DISTAL PHALANX OF LEFT MIDDLE FINGER WITH MALLET DEFORMITY: ICD-10-CM

## 2022-12-07 DIAGNOSIS — S62.633A CLOSED FRACTURE OF DISTAL PHALANX OF LEFT MIDDLE FINGER WITH MALLET DEFORMITY: ICD-10-CM

## 2022-12-07 DIAGNOSIS — M65.842 STENOSING TENOSYNOVITIS OF FINGER OF LEFT HAND: Primary | ICD-10-CM

## 2022-12-07 PROCEDURE — 73130 X-RAY EXAM OF HAND: CPT | Mod: TC | Performed by: RADIOLOGY

## 2022-12-07 PROCEDURE — 99204 OFFICE O/P NEW MOD 45 MIN: CPT | Performed by: FAMILY MEDICINE

## 2022-12-07 ASSESSMENT — PAIN SCALES - GENERAL: PAINLEVEL: NO PAIN (0)

## 2022-12-07 NOTE — LETTER
"    12/7/2022         RE: Nicky Hernández  [address]  [place] [address]        Dear Colleague,    Thank you for referring your patient, Nicky Hernández, to the [facility] [place]. Please see a copy of my visit note below.    CHIEF COMPLAINT:  Pain of the Left Hand     HISTORY OF PRESENT ILLNESS  Mr. Hernández is a pleasant 82 year old left hand dominant male who presents to clinic today with daughter for evaluation of left hand injury. Nicky explains that 3.5 months ago he was playing beach volleyball with his grandchildren, the ball hit the end of his left middle finger and \"clicked\". He had swelling and pain initially, but that has resolved. His main concern is at DIP of left middle finger. Patient would also like to discuss left small finger. He notes his small finger \"gets stuck\" in flexed position at night. It improves throughout the day. He notes this finger clicks. This has been ongoing 1 year with no known injury.     Onset: sudden  Location: left middle and small fingers  Duration: middle finger 3.5 months, small finger 1 year  Timing: Triggering in mornings only  Modifying factors:  resting and non-use makes it better, movement and use makes it worse  Associated signs & symptoms: None  Previous similar pain: No  Treatments to date: splint, ice initially and since discontinued    Additional history: as documented    Review of Systems: A 10-point review of systems was obtained and is negative except for as noted in the HPI.     MEDICAL HISTORY  Patient Active Problem List   Diagnosis     Hypertension, unspecified type     Ascending aorta dilation (H)     Skin lesion     RICHIE (obstructive sleep apnea)     Coronary artery calcification seen on CT scan     ILD (interstitial lung disease) (H)     Exposure to asbestos     IFG (impaired fasting glucose)       Current Outpatient Medications   Medication Sig Dispense Refill     aspirin (ASA) 81 MG tablet Take 81 mg by mouth daily       " atorvastatin (LIPITOR) 20 MG tablet Take 1 tablet (20 mg) by mouth daily 90 tablet 3     cyanocobalamin (VITAMIN B-12) 100 MCG tablet Take 100 mcg by mouth daily       losartan (COZAAR) 50 MG tablet Take 1 tablet (50 mg) by mouth daily 90 tablet 3     tamsulosin (FLOMAX) 0.4 MG capsule Take 1 capsule (0.4 mg) by mouth every other day Due for appointment. Please schedule: [phone] 45 capsule 3     Vitamin D3 (CHOLECALCIFEROL) 125 MCG (5000 UT) tablet Take by mouth daily         No Known Allergies    Family History   Problem Relation Age of Onset     Lung Cancer Father        Additional medical/Social/Surgical histories reviewed in [facility] and updated as appropriate.       PHYSICAL EXAM  There were no vitals taken for this visit.    General  - normal appearance, in no obvious distress  Musculoskeletal - left fifth finger  - inspection: no atrophy, normal joint alignment, no swelling  - palpation: tender palmar A1 pulley   - ROM:  MCP 90 deg flexion   0 deg extension    deg flexion   0 deg extension   DIP 80 deg flexion   0 deg extension   palpable snap at the A1 pulley with active flexion, reproducible intermittently with multiple attempts  Musculoskeletal - left middle finger  - inspection: no atrophy, normal joint alignment, finger flexed at rest 45 degrees  - palpation: Nontender  - ROM:  MCP 90 deg flexion   0 deg extension    deg flexion   0 deg extension   DIP 80 deg flexion   40 deg extension and inability to actively extend to 0.  Passive extension to 0 painless  - strength: 0/5 DIP extension strength. 5/5 DIP flexion strength  Neuro  - no numbness, no motor deficit, grossly normal coordination, normal muscle tone  Skin  - no ecchymosis, erythema, warmth, or induration, no obvious rash    IMAGING :  XR left hand 3V. Final results and radiologist's interpretation, available in the [facility] health record. Images were reviewed with the patient/family members in the office today. My personal  interpretation of the performed imaging is bony avulsion at dorsal lip of distal phalanx retracted to level of middle phalanx consistent with mallet fracture. IPJ DJD of thumb. Mild polyarthritic changes otherwise.     ASSESSMENT & PLAN  Mr. Hernández is a 82 year old year old male who presents to clinic today with left middle finger injury 3.5 months ago as well as more recent triggering of left fifth finger in mornings only.    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  At this time no conservative management or splinting would provide adequate reduction of fracture fragment.  This would have to be managed surgically if he desired full session of his DIP joint of middle finger.  After discussion, he is not particularly bothered by this loss of extension and I agree that it would be very reasonable to continue to live with this my deformity.  He will let us know if becomes more painful or seems to be worsening and may consider hand surgical referral at that time.    In regard to his stenosing tenosynovitis of left fifth finger, would like him to start using oval 8 splint overnight and using Voltaren gel twice daily.  Discussed that since it is only occurring in the mornings, that we may be able to make tangible change and improve this condition with simple treatment above.  If it does persist, we discussed ultrasound-guided trigger finger injection at the A1 pulley as her next reasonable step.  Lastly we did discuss surgical intervention which would include A1 pulley release, but patient would be interested in all nonsurgical options first.    Follow-up in 6 weeks as needed to consider trigger finger injection.    45 minutes on date of the encounter doing chart review, history and examination, independent imaging review, documentation, and additional activities noted above.      It was a  pleasure seeing Nicky today.    Torsten Sood DO, [facility]  Primary Care Sports Medicine      Again, thank you for allowing me to participate in the care of your patient.        Sincerely,        Torsten Sood DO

## 2022-12-07 NOTE — PROGRESS NOTES
"CHIEF COMPLAINT:  Pain of the Left Hand     HISTORY OF PRESENT ILLNESS  Mr. Hernández is a pleasant 82 year old left hand dominant male who presents to clinic today with daughter for evaluation of left hand injury. Nicky explains that 3.5 months ago he was playing beach volleyball with his grandchildren, the ball hit the end of his left middle finger and \"clicked\". He had swelling and pain initially, but that has resolved. His main concern is at DIP of left middle finger. Patient would also like to discuss left small finger. He notes his small finger \"gets stuck\" in flexed position at night. It improves throughout the day. He notes this finger clicks. This has been ongoing 1 year with no known injury.     Onset: sudden  Location: left middle and small fingers  Duration: middle finger 3.5 months, small finger 1 year  Timing: Triggering in mornings only  Modifying factors:  resting and non-use makes it better, movement and use makes it worse  Associated signs & symptoms: None  Previous similar pain: No  Treatments to date: splint, ice initially and since discontinued    Additional history: as documented    Review of Systems: A 10-point review of systems was obtained and is negative except for as noted in the HPI.     MEDICAL HISTORY  Patient Active Problem List   Diagnosis     Hypertension, unspecified type     Ascending aorta dilation (H)     Skin lesion     RICHIE (obstructive sleep apnea)     Coronary artery calcification seen on CT scan     ILD (interstitial lung disease) (H)     Exposure to asbestos     IFG (impaired fasting glucose)       Current Outpatient Medications   Medication Sig Dispense Refill     aspirin (ASA) 81 MG tablet Take 81 mg by mouth daily       atorvastatin (LIPITOR) 20 MG tablet Take 1 tablet (20 mg) by mouth daily 90 tablet 3     cyanocobalamin (VITAMIN B-12) 100 MCG tablet Take 100 mcg by mouth daily       losartan (COZAAR) 50 MG tablet Take 1 tablet (50 mg) by mouth daily 90 tablet 3     " tamsulosin (FLOMAX) 0.4 MG capsule Take 1 capsule (0.4 mg) by mouth every other day Due for appointment. Please schedule: [phone] 45 capsule 3     Vitamin D3 (CHOLECALCIFEROL) 125 MCG (5000 UT) tablet Take by mouth daily         No Known Allergies    Family History   Problem Relation Age of Onset     Lung Cancer Father        Additional medical/Social/Surgical histories reviewed in [facility] and updated as appropriate.       PHYSICAL EXAM  There were no vitals taken for this visit.    General  - normal appearance, in no obvious distress  Musculoskeletal - left fifth finger  - inspection: no atrophy, normal joint alignment, no swelling  - palpation: tender palmar A1 pulley   - ROM:  MCP 90 deg flexion   0 deg extension    deg flexion   0 deg extension   DIP 80 deg flexion   0 deg extension   palpable snap at the A1 pulley with active flexion, reproducible intermittently with multiple attempts  Musculoskeletal - left middle finger  - inspection: no atrophy, normal joint alignment, finger flexed at rest 45 degrees  - palpation: Nontender  - ROM:  MCP 90 deg flexion   0 deg extension    deg flexion   0 deg extension   DIP 80 deg flexion   40 deg extension and inability to actively extend to 0.  Passive extension to 0 painless  - strength: 0/5 DIP extension strength. 5/5 DIP flexion strength  Neuro  - no numbness, no motor deficit, grossly normal coordination, normal muscle tone  Skin  - no ecchymosis, erythema, warmth, or induration, no obvious rash    IMAGING :  XR left hand 3V. Final results and radiologist's interpretation, available in the [facility] health record. Images were reviewed with the patient/family members in the office today. My personal interpretation of the performed imaging is bony avulsion at dorsal lip of distal phalanx retracted to level of middle phalanx consistent with mallet fracture. IPJ DJD of thumb. Mild polyarthritic changes otherwise.     ASSESSMENT & PLAN  Mr. Hernández is a 82 year  old year old male who presents to clinic today with left middle finger injury 3.5 months ago as well as more recent triggering of left fifth finger in mornings only.    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  At this time no conservative management or splinting would provide adequate reduction of fracture fragment.  This would have to be managed surgically if he desired full session of his DIP joint of middle finger.  After discussion, he is not particularly bothered by this loss of extension and I agree that it would be very reasonable to continue to live with this my deformity.  He will let us know if becomes more painful or seems to be worsening and may consider hand surgical referral at that time.    In regard to his stenosing tenosynovitis of left fifth finger, would like him to start using oval 8 splint overnight and using Voltaren gel twice daily.  Discussed that since it is only occurring in the mornings, that we may be able to make tangible change and improve this condition with simple treatment above.  If it does persist, we discussed ultrasound-guided trigger finger injection at the A1 pulley as her next reasonable step.  Lastly we did discuss surgical intervention which would include A1 pulley release, but patient would be interested in all nonsurgical options first.    Follow-up in 6 weeks as needed to consider trigger finger injection.    45 minutes on date of the encounter doing chart review, history and examination, independent imaging review, documentation, and additional activities noted above.      It was a pleasure seeing Nicky today.    Torsten Sood DO, [facility]  Primary Care Sports Medicine

## 2022-12-07 NOTE — PATIENT INSTRUCTIONS
"Thank you for choosing [facility]    DR [facility] CLINIC LOCATIONS  [place] [address] [address] [facility], 4th [place]   [place], [place], [address] [place] [address]   [phone]       APPOINTMENTS: [phone]    CARE QUESTIONS: [phone],    BILLING QUESTIONS: [phone]    FAX NUMBER: [phone]        Follow up: 6 weeks if trigger continues. If interested in injection please call [phone] to schedule an injection appointment.      1. Injury of finger of left hand            TRIGGER FINGER (STENOSING TENOSYNOVITIS)    WHAT IS TRIGGER FINGER?        Trigger finger is a problem with the tendons in your hand that makes it hard for you to straighten 1 or more fingers after you bend them. Tendons are strong bands of tissue that attach muscle to bone. A sheath, or covering, surrounds the tendons that go to your fingers. Tendons usually move easily through the sheaths. Trigger finger is an irritation and thickening of a tendon sheath that traps your tendon or makes it difficult for your tendon to move through the sheath.    Trigger finger is also called stenosing tenosynovitis or digital flexor tenosynovitis.    WHAT IS THE CAUSE?    The exact cause of trigger finger is not known. It may be that trigger finger is caused by overuse of the hand and fingers, such as from work or sports that use your fingers a lot. You may be more likely to get trigger finger if you have diabetes or rheumatoid arthritis.    WHAT ARE THE SYMPTOMS?    Trigger finger can happen in your thumb or any finger, but your middle and ring finger are affected most often. Symptoms may include:    A snap, pop or sudden jerk (\"trigger\" motion) when you try to straighten your finger  Not being able to straighten your finger at all  Pain at the base of your finger or palm, or in your fingers    HOW IS IT DIAGNOSED?    Your healthcare provider will examine you and ask " about your symptoms, activities, and medical history. You may have X-rays or other scans.    HOW IS IT TREATED?    Your provider may give your finger a shot of steroid medicine to reduce the irritation and swelling so that the tendon can slide more easily through the sheath. In some cases you may need surgery to remove part of the tendon sheath.    HOW CAN I TAKE CARE OF MYSELF?    To help relieve swelling and pain:    Put an ice pack, gel pack, or package of frozen vegetables wrapped in a cloth, on the area every 3 to 4 hours for up to 20 minutes at a time.  Take pain medicine, such as acetaminophen, ibuprofen, or other medicine as directed by your provider. Nonsteroidal anti-inflammatory medicines (NSAIDs), such as ibuprofen, may cause stomach bleeding and other problems. These risks increase with age. Read the label and take as directed. Unless recommended by your healthcare provider, do not take for more than 10 days.  Follow your healthcare provider's instructions. Ask your provider:    How and when you will hear your test results  How long it will take to recover  What activities you should avoid, including how much you can lift, and when you can return to your normal activities  How to take care of yourself at home  What symptoms or problems you should watch for and what to do if you have them  Make sure you know when you should come back for a checkup.    HOW CAN I HELP PREVENT TRIGGER FINGER?    Since the cause of trigger finger is not known, there is no sure way to prevent it. Follow safety rules and use any protective equipment recommended for your work or sport. Avoid activities that cause pain.    EXERCISES:    https://www.[employer]/blogs/articles/trigger-finger-exercises    Developed by [employer].  Published by [employer].  Copyright  2014 [employer] and/or one of its subsidiaries. All rights reserved.    WHAT IS MALLET FINGER?    Mallet finger, also known as baseball finger, is a  tear in one of the tendons in your hand. Tendons are strong bands of tissue that connect muscle to bones.    The tendon that usually tears is called an extensor tendon, which attaches muscles in your hand to the bone near the tip of your finger. Extensor tendons are used to extend or straighten your fingers.    WHAT IS THE CAUSE?    Mallet finger is usually caused by a jamming injury to the tip of the finger. This can happen with any activity where there is a blow to the tips of your outstretched fingers, such as catching a ball in baseball, basketball, or volleyball. The stress of the injury can pull the tendon off the bone, tear a small piece of bone off the finger, or tear the tendon itself.    WHAT ARE THE SYMPTOMS?    You may have pain and swelling at the tip of the finger. You may not be able to straighten the tip of your finger. If you don t get medical care for within a week or two after the injury, you may permanently lose the ability to straighten your finger.    HOW IS IT DIAGNOSED?    Your healthcare provider will examine you and ask about your symptoms, activities, and medical history. You may have X-rays or other scans.    HOW IS IT TREATED?    Your healthcare provider will straighten your finger and put it in a splint for 6 to 12 weeks. This will allow the tendon to reattach to your finger bone or, if a piece of bone has been pulled off, to allow the bone to heal.    Your provider may recommend stretching and strengthening exercises to help you heal after the splint is no longer needed.    If the injury is severe, you may need surgery to repair the tendon or reset the bone.    HOW CAN I TAKE CARE OF MYSELF?    To reduce swelling and pain for the first few days after the injury:    Put an ice pack, gel pack, or package of frozen vegetables wrapped in a cloth, on the area every 3 to 4 hours for up to 20 minutes at a time.  Keep your hand up on a pillow when you sit or lie down.  Take pain medicine, such  as acetaminophen, ibuprofen, or other medicine as directed by your provider. Nonsteroidal anti-inflammatory medicines (NSAIDs), such as ibuprofen, may cause stomach bleeding and other problems. These risks increase with age. Read the label and take as directed. Unless recommended by your healthcare provider, do not take for more than 10 days.  Follow your healthcare provider's instructions, including any exercises recommended by your provider. Ask your provider:    How and when you will hear your test results  How long it will take to recover  What activities you should avoid, including how much you can lift, and when you can return to your normal activities  How to take care of yourself at home  What symptoms or problems you should watch for and what to do if you have them  Make sure you know when you should come back for a checkup.    HOW CAN I HELP PREVENT MALLET FINGER?    Mallet finger is caused by a direct blow to the end of the finger during an accident that may be hard to prevent. Follow the safety rules for your work or sport and use protective equipment, such as gloves, taping, splinting, or protective strapping before a game.    Developed by [employer].  Published by [employer].  Copyright  2014 [employer] and/or one of its subsidiaries. All rights reserved.

## 2023-03-09 ENCOUNTER — TELEPHONE (OUTPATIENT)
Dept: FAMILY MEDICINE | Facility: CLINIC | Age: 83
End: 2023-03-09
Payer: MEDICARE

## 2023-03-09 DIAGNOSIS — Z01.00 VISIT FOR EYE AND VISION EXAM: Primary | ICD-10-CM

## 2023-03-09 NOTE — TELEPHONE ENCOUNTER
Please can we place referral for optometry/ophthalmology per patient request I will cosign.  See prior message from referral coordinator.  Thank you

## 2023-04-22 ENCOUNTER — OFFICE VISIT (OUTPATIENT)
Dept: OPHTHALMOLOGY | Facility: CLINIC | Age: 83
End: 2023-04-22
Payer: MEDICARE

## 2023-04-22 DIAGNOSIS — Z01.00 VISIT FOR EYE AND VISION EXAM: ICD-10-CM

## 2023-04-22 PROCEDURE — 99207 PR NO BILLABLE SERVICE THIS VISIT: CPT | Performed by: OPTOMETRIST

## 2023-04-26 ENCOUNTER — OFFICE VISIT (OUTPATIENT)
Dept: OPHTHALMOLOGY | Facility: CLINIC | Age: 83
End: 2023-04-26
Attending: OPHTHALMOLOGY
Payer: MEDICARE

## 2023-04-26 DIAGNOSIS — H52.223 REGULAR ASTIGMATISM OF BOTH EYES: ICD-10-CM

## 2023-04-26 DIAGNOSIS — H04.123 DRY EYE SYNDROME OF BOTH EYES: ICD-10-CM

## 2023-04-26 DIAGNOSIS — H35.89 MACULAR ATROPHY, RETINAL: Primary | ICD-10-CM

## 2023-04-26 DIAGNOSIS — H47.233 CUPPING OF OPTIC DISC, BILATERAL: ICD-10-CM

## 2023-04-26 DIAGNOSIS — H25.13 NUCLEAR SENILE CATARACT OF BOTH EYES: ICD-10-CM

## 2023-04-26 DIAGNOSIS — H35.54 PATTERN DYSTROPHY OF MACULA: ICD-10-CM

## 2023-04-26 PROCEDURE — 92250 FUNDUS PHOTOGRAPHY W/I&R: CPT | Performed by: OPHTHALMOLOGY

## 2023-04-26 PROCEDURE — 99207 FUNDUS PHOTOS OU (BOTH EYES): CPT | Mod: 26 | Performed by: OPHTHALMOLOGY

## 2023-04-26 PROCEDURE — 76519 ECHO EXAM OF EYE: CPT | Performed by: OPHTHALMOLOGY

## 2023-04-26 PROCEDURE — 92134 CPTRZ OPH DX IMG PST SGM RTA: CPT | Performed by: OPHTHALMOLOGY

## 2023-04-26 PROCEDURE — 92025 CPTRIZED CORNEAL TOPOGRAPHY: CPT | Performed by: OPHTHALMOLOGY

## 2023-04-26 PROCEDURE — 99204 OFFICE O/P NEW MOD 45 MIN: CPT | Mod: GC | Performed by: OPHTHALMOLOGY

## 2023-04-26 PROCEDURE — G0463 HOSPITAL OUTPT CLINIC VISIT: HCPCS | Performed by: OPHTHALMOLOGY

## 2023-04-26 ASSESSMENT — REFRACTION_WEARINGRX
OD_SPHERE: +1.50
SPECS_TYPE: SVL DISTANCE
OD_AXIS: 149
OS_CYLINDER: +1.50
OD_CYLINDER: +0.75
OD_AXIS: 150
OS_CYLINDER: +1.50
OS_AXIS: 005
OS_SPHERE: +0.25
OS_AXIS: 003
OD_SPHERE: PLANO
OS_SPHERE: +0.25
OD_CYLINDER: +2.25

## 2023-04-26 ASSESSMENT — CONF VISUAL FIELD
OS_INFERIOR_TEMPORAL_RESTRICTION: 0
OD_NORMAL: 1
OD_INFERIOR_TEMPORAL_RESTRICTION: 0
OD_INFERIOR_NASAL_RESTRICTION: 0
OD_SUPERIOR_TEMPORAL_RESTRICTION: 0
OS_NORMAL: 1
OS_INFERIOR_NASAL_RESTRICTION: 0
OS_SUPERIOR_NASAL_RESTRICTION: 0
OS_SUPERIOR_TEMPORAL_RESTRICTION: 0
OD_SUPERIOR_NASAL_RESTRICTION: 0

## 2023-04-26 ASSESSMENT — REFRACTION_MANIFEST
OD_SPHERE: -0.75
OS_CYLINDER: +2.25
OS_SPHERE: -1.00
OD_AXIS: 166
OD_CYLINDER: +2.75
OS_AXIS: 009

## 2023-04-26 ASSESSMENT — EXTERNAL EXAM - RIGHT EYE: OD_EXAM: NORMAL

## 2023-04-26 ASSESSMENT — CUP TO DISC RATIO
OS_RATIO: 0.6
OD_RATIO: 0.6

## 2023-04-26 ASSESSMENT — TONOMETRY
IOP_METHOD: TONOPEN
IOP_METHOD: APPLANATION
OS_IOP_MMHG: 16
OD_IOP_MMHG: 16
OS_IOP_MMHG: 17
OD_IOP_MMHG: 16

## 2023-04-26 ASSESSMENT — VISUAL ACUITY
METHOD_MR: DIAGNOSTIC
CORRECTION_TYPE: GLASSES
OS_PH_CC: 20/30-2
OD_PH_CC: 20/30-/+
OD_BAT_MED: 20/50-/+2
OS_CC: 20/50-/+
METHOD: SNELLEN - LINEAR
OD_CC: 20/60-2/+2
OS_BAT_MED: >20/400

## 2023-04-26 ASSESSMENT — SLIT LAMP EXAM - LIDS
COMMENTS: NORMAL
COMMENTS: NORMAL

## 2023-04-26 ASSESSMENT — EXTERNAL EXAM - LEFT EYE: OS_EXAM: NORMAL

## 2023-04-26 NOTE — PROGRESS NOTES
CC -  Cataract and AMD eval.     INTERVAL HISTORY - Initial visit    HPI -   Nicky Hernández is a 82 year old male here for evaluation. He was referred by Dr. Joy who recommended he get evaluated for cataract and macular degeneration.       PAST MEDICAL HISTORY:  RICHIE (uses CPAP)  Interstitial lung disease  HTN    PAST OCULAR SURGERY:  2017 Right Laser retinopexy ([facility])    PAST OCULAR HISTORY:  No LASIK  Mother with glaucoma (treated w/ drops)      RETINAL IMAGING:    OCT Macula 04/26/23  OD: foveal contour present, no fluid, pattern-type elevations of RPE with focal area of cRORA temporal to fovea, choroid equal thickness to retina, hyaloid not visualized  OS: foveal contour present, no fluid,  focal area of cRORA nasal to fovea with some extension superior to fovea, adjacent drusen, choroid equal thickness to retina, hyaloid not visualized    Optos 04/26/23  OD: macula with mild pigment abnormalities, area of macular atrophy T to fovea, disc normal, temporal area in the periphery with scar appears c/w laser barricade, no hyperAF of the FAF in this region, on FAF there is hyperAF of pattern-type bands in the macula  OS: relatively normal color and FAF, some nasal peripheral reticular pigment change, small area of macular atrophy in nasal macula, minimal hyperAF patterns in macula    ASSESSMENT & PLAN    # Pattern dystrophy, both eyes   - recommend AREDS2 vitamins   - Amsler grid given to patient w/ instruction 04/26/23    # Macular atrophy, both eyes   - off fovea, a/w pattern dystrophy   - monitor w/ FAF    # Cataract OU   - BCVA OD: 20/20   - BCVA OS: 20/30    - severe glare w/ medium light OS>>OD   - pt is content w/ vision for now   - MRx dispensed    # Large cup:disc ratios OU   - some thinning outside ISNT rule   - FH of glaucoma   - on exam angles are narrow by VH OU   - recommend further eval w/ undilated gonioscopy     return to clinic: 6 months for OCT RNFL, visual field, gonioscopy, pachymetry    Rajendra  MD Be[place]  Retina Fellow, PGY6    Complete documentation of historical and exam elements from today's encounter can be found in the full encounter summary report (not reduplicated in this progress note). I personally obtained the chief complaint(s) and history of present illness.  I confirmed and edited as necessary the review of systems, past medical/surgical history, family history, social history, and examination findings as documented by others; and I examined the patient myself. I personally reviewed the relevant tests, images, and reports as documented above. I formulated and edited as necessary the assessment and plan and discussed the findings and management plan with the patient and family.    Ankit Hernandez MD, PhD

## 2023-05-17 ENCOUNTER — OFFICE VISIT (OUTPATIENT)
Dept: URGENT CARE | Facility: URGENT CARE | Age: 83
End: 2023-05-17
Payer: MEDICARE

## 2023-05-17 ENCOUNTER — ANCILLARY PROCEDURE (OUTPATIENT)
Dept: GENERAL RADIOLOGY | Facility: CLINIC | Age: 83
End: 2023-05-17
Attending: PHYSICIAN ASSISTANT
Payer: MEDICARE

## 2023-05-17 VITALS
HEART RATE: 67 BPM | DIASTOLIC BLOOD PRESSURE: 86 MMHG | BODY MASS INDEX: 31.12 KG/M2 | SYSTOLIC BLOOD PRESSURE: 182 MMHG | WEIGHT: 187 LBS | OXYGEN SATURATION: 98 % | TEMPERATURE: 97.1 F

## 2023-05-17 DIAGNOSIS — M54.2 CERVICALGIA: Primary | ICD-10-CM

## 2023-05-17 PROCEDURE — 72040 X-RAY EXAM NECK SPINE 2-3 VW: CPT | Mod: TC | Performed by: RADIOLOGY

## 2023-05-17 PROCEDURE — 99214 OFFICE O/P EST MOD 30 MIN: CPT

## 2023-05-17 RX ORDER — TIZANIDINE 2 MG/1
4 TABLET ORAL 3 TIMES DAILY PRN
Qty: 30 TABLET | Refills: 0 | Status: SHIPPED | OUTPATIENT
Start: 2023-05-17 | End: 2023-12-01

## 2023-05-17 RX ORDER — CELECOXIB 100 MG/1
100 CAPSULE ORAL 2 TIMES DAILY
Qty: 20 CAPSULE | Refills: 0 | Status: SHIPPED | OUTPATIENT
Start: 2023-05-17 | End: 2023-12-01

## 2023-05-17 ASSESSMENT — PAIN SCALES - GENERAL: PAINLEVEL: MODERATE PAIN (4)

## 2023-05-17 NOTE — PROGRESS NOTES
Cervicalgia  - XR Cervical Spine 2/3 Views  - celecoxib (CELEBREX) 100 MG capsule; Take 1 capsule (100 mg) by mouth 2 times daily for 10 days  - tiZANidine (ZANAFLEX) 2 MG tablet; Take 2 tablets (4 mg) by mouth 3 times daily as needed for muscle spasms    Rest the affected area as much as possible.  Apply ice for 15-20 minutes intermittently as needed and especially after any offending activity. Hot packs are better for muscle spasms and cramping. Daily stretching as tolerated.  As pain recedes, begin normal activities slowly as tolerated.  Consider Physical Therapy after 6 weeks if symptoms not better with conservative care.      Okay to take acetaminophen 500 mg- 2 tabs (Total of 1000 mg) every 8 hrs     Patient was advised to return to clinic if symptoms do not improve in the amount of time specified in the AVS or if symptoms worsen. Patient educated on red flag symptoms and asked to go directly to the ED if symptoms present themselves.     Wilmer Cantrell PA-C  [facility]    Subjective   82 year old who presents to clinic today for the following health issues:    Neck Pain       HPI   Where in your body do you have pain? Neck Pain  Onset/Duration: 3 days- describes the pain as a dull ache.    Description:   Location: Right upper neck  Radiation: none  Intensity: moderate to severe   Progression of Symptoms:  Worse at night and waxing and waning  Accompanying Signs & Symptoms:  Burning, tingling, prickly sensation in arm(s): No  Numbness in arm(s): No  Weakness in arm(s):  No  Fever: No  Headache: No  Nausea and/or vomiting: No  Denies dizziness, confusion, difficulty speaking, changes in hearing, light sensitivity, facial paralysis, facial numbness or tinging.   History:   Trauma: No  Previous neck pain: No  Previous surgery or injections: No  Previous Imaging (MRI,X ray): No  Precipitating or alleviating factors: None  Does movement impact the pain:  Holding the neck in a flexion position  for long periods of time make the pain worse.   Therapies tried and outcome: rest/inactivity, ice and Ibuprofen    Review of Systems   Review of Systems   See HPI    Objective    Temp: 97.1  F (36.2  C) Temp src: Tympanic BP: (!) 182/86 Pulse: 67     SpO2: 98 %       Physical Exam   Physical Exam  Constitutional:       General: He is not in acute distress.     Appearance: Normal appearance. He is normal weight. He is not ill-appearing, toxic-appearing or diaphoretic.   HENT:      Head: Normocephalic and atraumatic.   Neck:        Comments: Patient has movement pain in the area shown above without focal tenderness.  There is no swelling, erythema, or deformities of this area.  Cardiovascular:      Rate and Rhythm: Normal rate.      Pulses: Normal pulses.   Pulmonary:      Effort: Pulmonary effort is normal. No respiratory distress.   Neurological:      General: No focal deficit present.      Mental Status: He is alert and oriented to person, place, and time. Mental status is at baseline.      Gait: Gait normal.   Psychiatric:         Mood and Affect: Mood normal.         Behavior: Behavior normal.         Thought Content: Thought content normal.         Judgment: Judgment normal.          Results for orders placed or performed in visit on 05/17/23 (from the past 24 hour(s))   XR Cervical Spine 2/3 Views    Narrative    EXAM: XR CERVICAL SPINE 2/3 VIEWS  LOCATION: [facility] [place]  DATE/TIME: 5/17/2023 6:27 PM CDT    INDICATION:  Cervicalgia  COMPARISON: None.      Impression    IMPRESSION: Grade 1 anterolisthesis C4 on C5 measuring 3 mm. Retrolisthesis C5 on C6 measuring 3.5 mm. Mild age-indeterminate C5 vertebral body height loss cervical disc degenerative change is advanced at C5-C6. Advanced cervical facet arthrosis.   Calcifications are noted in the region of the carotid arteries.

## 2023-06-05 ENCOUNTER — ALLIED HEALTH/NURSE VISIT (OUTPATIENT)
Dept: OPHTHALMOLOGY | Facility: CLINIC | Age: 83
End: 2023-06-05
Attending: OPHTHALMOLOGY
Payer: MEDICARE

## 2023-06-05 DIAGNOSIS — H25.13 NUCLEAR SENILE CATARACT OF BOTH EYES: Primary | ICD-10-CM

## 2023-06-05 PROCEDURE — 99207 PR NO CHARGE COORDINATED CARE PS: CPT | Performed by: OPHTHALMOLOGY

## 2023-06-05 ASSESSMENT — REFRACTION_MANIFEST
OS_SPHERE: +1.50
OD_AXIS: 165
OS_AXIS: 006
OD_SPHERE: -0.50
OD_CYLINDER: +2.75
OS_CYLINDER: +2.25
OD_CYLINDER: +2.75
OS_SPHERE: -1.00
OD_SPHERE: +2.00
OS_AXIS: 006
OS_CYLINDER: +2.25
OD_AXIS: 165

## 2023-06-12 ENCOUNTER — OFFICE VISIT (OUTPATIENT)
Dept: CARDIOLOGY | Facility: CLINIC | Age: 83
End: 2023-06-12
Payer: MEDICARE

## 2023-06-12 VITALS
DIASTOLIC BLOOD PRESSURE: 73 MMHG | SYSTOLIC BLOOD PRESSURE: 127 MMHG | HEART RATE: 66 BPM | OXYGEN SATURATION: 96 % | BODY MASS INDEX: 30.49 KG/M2 | HEIGHT: 65 IN | WEIGHT: 183 LBS

## 2023-06-12 DIAGNOSIS — I10 ESSENTIAL HYPERTENSION: ICD-10-CM

## 2023-06-12 DIAGNOSIS — I77.810 ASCENDING AORTA DILATATION (H): Primary | ICD-10-CM

## 2023-06-12 DIAGNOSIS — E78.2 MIXED HYPERLIPIDEMIA: ICD-10-CM

## 2023-06-12 PROCEDURE — 99214 OFFICE O/P EST MOD 30 MIN: CPT | Performed by: INTERNAL MEDICINE

## 2023-06-12 NOTE — LETTER
6/12/2023    Tom Oneal MD  [address]  [facility] [address]    RE: Nicky Hernández       Dear Colleague,     I had the pleasure of seeing Nicky Hernández in the [facility].  CARDIOLOGY CLINIC FOLLOW-UP VISIT      REASON FOR VISIT:   Follow-up thoracic aortic aneurysm    PRIMARY CARE PHYSICIAN:  Tom Oneal        History of Present Illness   Nicky Hernández is an extremely pleasant 82 year old male with a history of hypertension, hyperlipidemia, and thoracic aortic aneurysm, here for routine follow-up.  His FH is notable for his nephew unfortunately dying of a ruptured TAA in early 2023.      Since his last visit with me in 4/2022, he reports that he continues to do very well with no cardiac complaints.  No chest pains, shortness of breath, lower extremity swelling, palpitations, lightheadedness, or any other complaints.  No recent hospitalizations.  He understandably has been distressed by the death of his nephew from a ruptured aorta.    His blood pressure is elevated today is good at 127/73.   His last labs were from 11/21/22, and showed a total cholesterol of 176, HDL 39, LDL 87, , normal Na and K, normal renal function, and normal Hgb and Plt.  His initial TTE from 11/2020 showed a thoracic aorta measuring 4.3 cm.  This was followed with a CT of the chest measuring a maximal diameter of 3.7 cm.  Repeat TTE from 4/6/2022 showed a maximal diameter of 4.2 cm, a mildly dilated RV, and no other significant abnormalities.      Assessment & Plan     Ascending thoracic aortic aneurysm (4.2 cm on 4/2022 TTE; TTE measures approximately 0.6 cm greater than corresponding CTA)  FH of ruptured TAA in nephew in early 2023  Hypertension, generally well controlled  Hyperlipidemia, well controlled  Former tobacco abuse      -Plan for repeat TTE now to follow aneurysmal dimensions.  Given FH of fatal aortic emergency, will plan for lower threshold of 4.5 cm for intervention, though again  will need to confirm any TTE measurements with cross-sectional imaging given discordance  -Continue losartan 50 mg daily  -Again reiterated importance of avoiding heavy lifting, etc. requiring the Valsalva maneuver which could increase intrathoracic pressure.  -Frequency of aortic imaging pending results of above.  -Continue Lipitor 20 mg daily  --Labs drawn through PCP      Follow-up: 1 year, or sooner as needed        Justo Connolly MD  Interventional Cardiology  June 12, 2023        Medications   Current Outpatient Medications   Medication    aspirin (ASA) 81 MG tablet    atorvastatin (LIPITOR) 20 MG tablet    cyanocobalamin (VITAMIN B-12) 100 MCG tablet    losartan (COZAAR) 50 MG tablet    tamsulosin (FLOMAX) 0.4 MG capsule    Vitamin D3 (CHOLECALCIFEROL) 125 MCG (5000 UT) tablet    celecoxib (CELEBREX) 100 MG capsule    tiZANidine (ZANAFLEX) 2 MG tablet     No current facility-administered medications for this visit.     Allergies   No Known Allergies      Physical Exam       BP: 127/73 Pulse: 66     SpO2: 96 %      Vital Signs with Ranges  Pulse:  [66] 66  BP: (127)/(73) 127/73  SpO2:  [96 %] 96 %  183 lbs 0 oz    Constitutional: Well-appearing, no acute distress  Respiratory: Normal respiratory effort, CTAB  Cardiovascular: RRR, no m/r/g.  JVP < 7 cm H2O.  There is no LE edema.  Normal carotid upstrokes, no carotid bruits.        Thank you for allowing me to participate in the care of your patient.      Sincerely,     Justo Connolly MD     [facility]  cc:   No referring provider defined for this encounter.

## 2023-06-12 NOTE — PROGRESS NOTES
CARDIOLOGY CLINIC FOLLOW-UP VISIT      REASON FOR VISIT:   Follow-up thoracic aortic aneurysm    PRIMARY CARE PHYSICIAN:  Tom Oneal        History of Present Illness   Nicky Hernández is an extremely pleasant 82 year old male with a history of hypertension, hyperlipidemia, and thoracic aortic aneurysm, here for routine follow-up.  His FH is notable for his nephew unfortunately dying of a ruptured TAA in early 2023.      Since his last visit with me in 4/2022, he reports that he continues to do very well with no cardiac complaints.  No chest pains, shortness of breath, lower extremity swelling, palpitations, lightheadedness, or any other complaints.  No recent hospitalizations.  He understandably has been distressed by the death of his nephew from a ruptured aorta.    His blood pressure is elevated today is good at 127/73.   His last labs were from 11/21/22, and showed a total cholesterol of 176, HDL 39, LDL 87, , normal Na and K, normal renal function, and normal Hgb and Plt.  His initial TTE from 11/2020 showed a thoracic aorta measuring 4.3 cm.  This was followed with a CT of the chest measuring a maximal diameter of 3.7 cm.  Repeat TTE from 4/6/2022 showed a maximal diameter of 4.2 cm, a mildly dilated RV, and no other significant abnormalities.      Assessment & Plan     1. Ascending thoracic aortic aneurysm (4.2 cm on 4/2022 TTE; TTE measures approximately 0.6 cm greater than corresponding CTA)  a. FH of ruptured TAA in nephew in early 2023  2. Hypertension, generally well controlled  3. Hyperlipidemia, well controlled  4. Former tobacco abuse      -Plan for repeat TTE now to follow aneurysmal dimensions.  Given FH of fatal aortic emergency, will plan for lower threshold of 4.5 cm for intervention, though again will need to confirm any TTE measurements with cross-sectional imaging given discordance  -Continue losartan 50 mg daily  -Again reiterated importance of avoiding heavy lifting, etc. requiring  the Valsalva maneuver which could increase intrathoracic pressure.  -Frequency of aortic imaging pending results of above.  -Continue Lipitor 20 mg daily  --Labs drawn through PCP      Follow-up: 1 year, or sooner as needed        Justo Connolly MD  Interventional Cardiology  June 12, 2023        Medications   Current Outpatient Medications   Medication     aspirin (ASA) 81 MG tablet     atorvastatin (LIPITOR) 20 MG tablet     cyanocobalamin (VITAMIN B-12) 100 MCG tablet     losartan (COZAAR) 50 MG tablet     tamsulosin (FLOMAX) 0.4 MG capsule     Vitamin D3 (CHOLECALCIFEROL) 125 MCG (5000 UT) tablet     celecoxib (CELEBREX) 100 MG capsule     tiZANidine (ZANAFLEX) 2 MG tablet     No current facility-administered medications for this visit.     Allergies   No Known Allergies      Physical Exam       BP: 127/73 Pulse: 66     SpO2: 96 %      Vital Signs with Ranges  Pulse:  [66] 66  BP: (127)/(73) 127/73  SpO2:  [96 %] 96 %  183 lbs 0 oz    Constitutional: Well-appearing, no acute distress  Respiratory: Normal respiratory effort, CTAB  Cardiovascular: RRR, no m/r/g.  JVP < 7 cm H2O.  There is no LE edema.  Normal carotid upstrokes, no carotid bruits.

## 2023-06-29 ENCOUNTER — HOSPITAL ENCOUNTER (OUTPATIENT)
Dept: CARDIOLOGY | Facility: CLINIC | Age: 83
Discharge: HOME | End: 2023-06-29
Attending: INTERNAL MEDICINE | Admitting: INTERNAL MEDICINE
Payer: MEDICARE

## 2023-06-29 DIAGNOSIS — I77.810 ASCENDING AORTA DILATATION (H): Primary | ICD-10-CM

## 2023-06-29 LAB — LVEF ECHO: NORMAL

## 2023-06-29 PROCEDURE — 255N000002 HC RX 255 OP 636: Performed by: INTERNAL MEDICINE

## 2023-06-29 PROCEDURE — 999N000208 ECHOCARDIOGRAM COMPLETE

## 2023-06-29 PROCEDURE — 93306 TTE W/DOPPLER COMPLETE: CPT | Mod: 26 | Performed by: INTERNAL MEDICINE

## 2023-06-29 RX ADMIN — HUMAN ALBUMIN MICROSPHERES AND PERFLUTREN 3 ML: 10; .22 INJECTION, SOLUTION INTRAVENOUS at 15:39

## 2023-10-06 ENCOUNTER — IMMUNIZATION (OUTPATIENT)
Dept: FAMILY MEDICINE | Facility: CLINIC | Age: 83
End: 2023-10-06
Payer: MEDICARE

## 2023-10-06 DIAGNOSIS — Z23 NEED FOR PROPHYLACTIC VACCINATION AND INOCULATION AGAINST INFLUENZA: Primary | ICD-10-CM

## 2023-10-06 PROCEDURE — G0008 ADMIN INFLUENZA VIRUS VAC: HCPCS

## 2023-10-06 PROCEDURE — 90662 IIV NO PRSV INCREASED AG IM: CPT

## 2023-10-24 DIAGNOSIS — H35.89 MACULAR ATROPHY, RETINAL: Primary | ICD-10-CM

## 2023-11-01 ENCOUNTER — MYC MEDICAL ADVICE (OUTPATIENT)
Dept: SLEEP MEDICINE | Facility: CLINIC | Age: 83
End: 2023-11-01

## 2023-11-01 ENCOUNTER — OFFICE VISIT (OUTPATIENT)
Dept: OPHTHALMOLOGY | Facility: CLINIC | Age: 83
End: 2023-11-01
Attending: OPHTHALMOLOGY
Payer: MEDICARE

## 2023-11-01 DIAGNOSIS — H25.13 NUCLEAR SENILE CATARACT OF BOTH EYES: ICD-10-CM

## 2023-11-01 DIAGNOSIS — H47.233 CUPPING OF OPTIC DISC, BILATERAL: ICD-10-CM

## 2023-11-01 DIAGNOSIS — G47.33 OSA (OBSTRUCTIVE SLEEP APNEA): Primary | ICD-10-CM

## 2023-11-01 DIAGNOSIS — H35.54 PATTERN DYSTROPHY OF MACULA: ICD-10-CM

## 2023-11-01 DIAGNOSIS — H04.123 DRY EYE SYNDROME OF BOTH EYES: ICD-10-CM

## 2023-11-01 DIAGNOSIS — H35.89 MACULAR ATROPHY, RETINAL: Primary | ICD-10-CM

## 2023-11-01 DIAGNOSIS — H52.223 REGULAR ASTIGMATISM OF BOTH EYES: ICD-10-CM

## 2023-11-01 PROCEDURE — 99214 OFFICE O/P EST MOD 30 MIN: CPT | Mod: GC | Performed by: OPHTHALMOLOGY

## 2023-11-01 PROCEDURE — 92134 CPTRZ OPH DX IMG PST SGM RTA: CPT | Performed by: OPHTHALMOLOGY

## 2023-11-01 PROCEDURE — 92250 FUNDUS PHOTOGRAPHY W/I&R: CPT | Performed by: OPHTHALMOLOGY

## 2023-11-01 PROCEDURE — 92133 CPTRZD OPH DX IMG PST SGM ON: CPT | Performed by: OPHTHALMOLOGY

## 2023-11-01 PROCEDURE — G0463 HOSPITAL OUTPT CLINIC VISIT: HCPCS | Performed by: OPHTHALMOLOGY

## 2023-11-01 PROCEDURE — 99207 FUNDUS PHOTOS OU (BOTH EYES): CPT | Mod: 26 | Performed by: OPHTHALMOLOGY

## 2023-11-01 PROCEDURE — 99207 PR BUNDLED PROCEDURE IN GLOBAL PKG: CPT | Mod: 26 | Performed by: OPHTHALMOLOGY

## 2023-11-01 ASSESSMENT — VISUAL ACUITY
OD_CC+: -2
OS_CC: 20/50
OS_PH_CC: 20/40
OS_PH_CC+: -2
OS_CC+: +2
METHOD: SNELLEN - LINEAR
OD_CC: 20/40

## 2023-11-01 ASSESSMENT — CONF VISUAL FIELD
OS_NORMAL: 1
OD_INFERIOR_TEMPORAL_RESTRICTION: 0
OS_INFERIOR_TEMPORAL_RESTRICTION: 0
OD_INFERIOR_NASAL_RESTRICTION: 0
OD_SUPERIOR_TEMPORAL_RESTRICTION: 0
OD_SUPERIOR_NASAL_RESTRICTION: 0
METHOD: COUNTING FINGERS
OS_SUPERIOR_NASAL_RESTRICTION: 0
OS_SUPERIOR_TEMPORAL_RESTRICTION: 0
OS_INFERIOR_NASAL_RESTRICTION: 0
OD_NORMAL: 1

## 2023-11-01 ASSESSMENT — EXTERNAL EXAM - LEFT EYE: OS_EXAM: NORMAL

## 2023-11-01 ASSESSMENT — REFRACTION_WEARINGRX
OD_CYLINDER: +2.75
OS_CYLINDER: +2.25
OS_SPHERE: -1.00
OS_AXIS: 006
SPECS_TYPE: SVL
OD_SPHERE: -0.50
OD_AXIS: 165

## 2023-11-01 ASSESSMENT — SLIT LAMP EXAM - LIDS
COMMENTS: NORMAL
COMMENTS: NORMAL

## 2023-11-01 ASSESSMENT — TONOMETRY
OS_IOP_MMHG: 20
IOP_METHOD: TONOPEN
OD_IOP_MMHG: 19

## 2023-11-01 ASSESSMENT — CUP TO DISC RATIO
OS_RATIO: 0.6
OD_RATIO: 0.6

## 2023-11-01 ASSESSMENT — EXTERNAL EXAM - RIGHT EYE: OD_EXAM: NORMAL

## 2023-11-01 NOTE — PROGRESS NOTES
CC -  Cataract and AMD eval.     INTERVAL HISTORY - vision is more blurry and glare is interfering with driving and watching TV.    HPI -   Nicky Hernández is a 83 year old male here for evaluation. He was referred by Dr. Joy who recommended he get evaluated for cataract and macular degeneration.       PAST MEDICAL HISTORY:  RICHIE (uses CPAP)  Interstitial lung disease  HTN    PAST OCULAR SURGERY:  2017 Right Laser retinopexy ([facility])    PAST OCULAR HISTORY:  No LASIK  Mother with glaucoma (treated w/ drops)      RETINAL IMAGING:    OCT Macula 11/01/23  OD: foveal contour present, no fluid, pattern-type elevations of RPE with focal area of cRORA temporal to fovea (?potentially slighlty larger), choroid equal thickness to retina, hyaloid not visualized  OS: foveal contour present, no fluid,  focal area of cRORA nasal to fovea with some extension superior to fovea, adjacent drusen, choroid equal thickness to retina, hyaloid not visualized    Optos 11/01/23  OD: macula with mild pigment abnormalities, area of macular atrophy T to fovea, disc normal, temporal area in the periphery with scar appears c/w laser barricade, no hyperAF of the FAF in this region, on FAF there is hyperAF of pattern-type bands in the macula  OS: relatively normal color and FAF, some nasal peripheral reticular pigment change, small area of macular atrophy in nasal macula, minimal hyperAF patterns in macula    ASSESSMENT & PLAN    # Pattern dystrophy, both eyes   - recommend AREDS2 vitamins   - Amsler grid given to patient w/ instruction 04/26/23    # Macular atrophy, both eyes   - off fovea, a/w pattern dystrophy   - monitor w/ FAF    # Cataract OU   - BCVA OU: 20/40   - severe glare w/ medium light OS>>OD   - would recommend cataract surgery   - Risks of surgery including but not limited to infection (rare but a devastating complication that will need intraocular antibiotics injection and possibly more surgeries), risk of retinal detachment, dropped  nuclear pieces or dropped intraocular lens, glaucoma may need further medications, corneal edema that may need a long course of medication or even surgery were discussed  In length with the patient. We discussed about the benefits of CE IOL surgery and its alternatives. All the questions answered. Nicky agreed and wanted to proceed.    - case request placed for left eye CE IOL and then right eye in one week   - IOL calcs reviewed from April 2023: ref goal -0.5   - 45 minutes   - faculty case      # Large cup:disc ratios OU   - some thinning outside ISNT rule   - FH of glaucoma   - on exam angles are narrow by VH OU   - recommend further eval w/ undilated gonioscopy     Dispo: left eye surgery schedule for 11/27/23 and right eye for 12/4/23    Nilton Encinas MD MPH  Vitreoretinal Fellow PGY-6  [facility]       Complete documentation of historical and exam elements from today's encounter can be found in the full encounter summary report (not reduplicated in this progress note). I personally obtained the chief complaint(s) and history of present illness.  I confirmed and edited as necessary the review of systems, past medical/surgical history, family history, social history, and examination findings as documented by others; and I examined the patient myself. I personally reviewed the relevant tests, images, and reports as documented above. I formulated and edited as necessary the assessment and plan and discussed the findings and management plan with the patient and family.    Ankit Hernandez MD, PhD

## 2023-11-01 NOTE — NURSING NOTE
Chief Complaints and History of Present Illnesses   Patient presents with    Follow Up     Macular atrophy     Chief Complaint(s) and History of Present Illness(es)       Follow Up              Comments: Macular atrophy              Comments    Pt states no change in VA since last visit  States no flashes, floaters eye pain or redness    Zulay Hurley COT 10:02 AM November 1, 2023

## 2023-11-02 NOTE — TELEPHONE ENCOUNTER
Patient requesting an order for supplies.  LOV 9/16/22 and has a follow up scheduled 4/5/24.  Order for supplies pended for provider consideration.

## 2023-11-06 ENCOUNTER — TELEPHONE (OUTPATIENT)
Dept: OPHTHALMOLOGY | Facility: CLINIC | Age: 83
End: 2023-11-06
Payer: MEDICARE

## 2023-11-06 PROBLEM — H25.13 NUCLEAR SENILE CATARACT OF BOTH EYES: Status: ACTIVE | Noted: 2023-11-01

## 2023-11-06 NOTE — TELEPHONE ENCOUNTER
Patient is schedule for surgery with: Dr. Hernandez    Surgery Date: 11/27 & 12/4     Location: [facility]    H&P: to be completed by Primary Care team - patient instructed to schedule  [facility]     Post-op: 12/12 and 1/2    Patient will receive a phone call from pre-admission nurses 1-2 days prior to surgery with arrival time and NPO instructions.    Patient aware times are subject to change up until day before surgery.     Patient questions/concerns: N/A     Surgery packet was sent via US mail on 11/6      Trang Zhang on 11/6/2023 at 12:49 PM

## 2023-11-20 ENCOUNTER — TELEPHONE (OUTPATIENT)
Dept: FAMILY MEDICINE | Facility: CLINIC | Age: 83
End: 2023-11-20
Payer: MEDICARE

## 2023-11-20 NOTE — TELEPHONE ENCOUNTER
"TWILA Payan is calling stating that she would like the \"wellness visit\" to be changed to a \"preop visit\" for patient as he is having surgery soon after the \"wellness visit.\"    Please advise if ok to switch from a wellness visit to a preop?    Brielle Lou RN on 11/20/2023 at 3:19 PM    "

## 2023-11-21 ASSESSMENT — ENCOUNTER SYMPTOMS
SHORTNESS OF BREATH: 0
HEMATOCHEZIA: 0
COUGH: 0
PARESTHESIAS: 0
HEARTBURN: 0
PALPITATIONS: 0
FREQUENCY: 0
DIARRHEA: 0
CHILLS: 0
NERVOUS/ANXIOUS: 0
SORE THROAT: 0
NAUSEA: 0
WEAKNESS: 0
DYSURIA: 0
MYALGIAS: 0
HEADACHES: 0
EYE PAIN: 0
HEMATURIA: 0
DIZZINESS: 0
FEVER: 0
ABDOMINAL PAIN: 0
CONSTIPATION: 0
JOINT SWELLING: 0
ARTHRALGIAS: 0

## 2023-11-21 ASSESSMENT — ACTIVITIES OF DAILY LIVING (ADL): CURRENT_FUNCTION: NO ASSISTANCE NEEDED

## 2023-11-21 NOTE — TELEPHONE ENCOUNTER
My understanding these are completely separate visits and as per clinic policy.  Preop has separate guidelines.structure we follow , inorder to clear patient for surgery, if patient has surgery upcoiming, we can do a preop physical and schedule a wellness at a separate time, can be virtual as well so would be convenient for patient.

## 2023-11-21 NOTE — TELEPHONE ENCOUNTER
Pt and pts wife (pt gave verbal consent to speak to wife about health information) called  clinic. Relayed Dr Oneal's message from below.      Pts wife is asking if:   Can wellness visit can be an add-on to pre-op appt tomorrow? Pts wife stated they are traveling soon and that's why they are asking for the pre-op and wellness visit to be on the same day.     Routing to PCP to review and advise.     Please call pt back with PCPs recommendations.

## 2023-11-21 NOTE — TELEPHONE ENCOUNTER
Writer called and spoke with patient and reviewed PCP's recommendations, patient expressed verbal understanding and is agreeable.    Changed appt tomorrow 11/22 for pre-op (patient has surgery on 11/27) and also scheduled future virtual wellness appt:    11/22/2023 7:30 AM (Arrive by 7:10 AM) Tom Oneal MD [facility]     12/1/2023 4:00 PM (Arrive by 3:55 PM) Tom Oneal MD [facility]     No further questions or concerns at this time.    Signing encounter.    Urbano Diallo RN  [facility]

## 2023-11-21 NOTE — TELEPHONE ENCOUNTER
Okay to schedule preop physical.  Also patient will need separate wellness visit which I recommend

## 2023-11-22 ENCOUNTER — ANESTHESIA EVENT (OUTPATIENT)
Dept: SURGERY | Facility: AMBULATORY SURGERY CENTER | Age: 83
End: 2023-11-22
Payer: MEDICARE

## 2023-11-22 ENCOUNTER — OFFICE VISIT (OUTPATIENT)
Dept: FAMILY MEDICINE | Facility: CLINIC | Age: 83
End: 2023-11-22
Payer: MEDICARE

## 2023-11-22 VITALS
HEART RATE: 91 BPM | RESPIRATION RATE: 16 BRPM | BODY MASS INDEX: 29.49 KG/M2 | HEIGHT: 65 IN | SYSTOLIC BLOOD PRESSURE: 155 MMHG | WEIGHT: 177 LBS | TEMPERATURE: 97.7 F | DIASTOLIC BLOOD PRESSURE: 80 MMHG

## 2023-11-22 DIAGNOSIS — H26.9 CATARACT OF BOTH EYES, UNSPECIFIED CATARACT TYPE: ICD-10-CM

## 2023-11-22 DIAGNOSIS — Z23 NEED FOR VACCINATION: ICD-10-CM

## 2023-11-22 DIAGNOSIS — I10 HYPERTENSION, UNSPECIFIED TYPE: ICD-10-CM

## 2023-11-22 DIAGNOSIS — Z23 NEED FOR VACCINE FOR TD (TETANUS-DIPHTHERIA): ICD-10-CM

## 2023-11-22 DIAGNOSIS — G47.33 OSA (OBSTRUCTIVE SLEEP APNEA): ICD-10-CM

## 2023-11-22 DIAGNOSIS — E78.5 HYPERLIPIDEMIA LDL GOAL <100: ICD-10-CM

## 2023-11-22 DIAGNOSIS — N13.9 LOWER URINARY OBSTRUCTIVE SYMPTOM: ICD-10-CM

## 2023-11-22 DIAGNOSIS — R73.01 IFG (IMPAIRED FASTING GLUCOSE): ICD-10-CM

## 2023-11-22 DIAGNOSIS — Z01.818 PRE-OPERATIVE GENERAL PHYSICAL EXAMINATION: Primary | ICD-10-CM

## 2023-11-22 DIAGNOSIS — I25.10 CORONARY ARTERY CALCIFICATION SEEN ON CT SCAN: ICD-10-CM

## 2023-11-22 LAB
ALBUMIN SERPL BCG-MCNC: 4.4 G/DL (ref 3.5–5.2)
ALP SERPL-CCNC: 93 U/L (ref 40–150)
ALT SERPL W P-5'-P-CCNC: 22 U/L (ref 0–70)
ANION GAP SERPL CALCULATED.3IONS-SCNC: 10 MMOL/L (ref 7–15)
AST SERPL W P-5'-P-CCNC: 28 U/L (ref 0–45)
BILIRUB SERPL-MCNC: 0.4 MG/DL
BUN SERPL-MCNC: 15 MG/DL (ref 8–23)
CALCIUM SERPL-MCNC: 10 MG/DL (ref 8.8–10.2)
CHLORIDE SERPL-SCNC: 101 MMOL/L (ref 98–107)
CHOLEST SERPL-MCNC: 133 MG/DL
CREAT SERPL-MCNC: 1.03 MG/DL (ref 0.67–1.17)
CREAT UR-MCNC: 117 MG/DL
DEPRECATED HCO3 PLAS-SCNC: 26 MMOL/L (ref 22–29)
EGFRCR SERPLBLD CKD-EPI 2021: 73 ML/MIN/1.73M2
ERYTHROCYTE [DISTWIDTH] IN BLOOD BY AUTOMATED COUNT: 13 % (ref 10–15)
GLUCOSE SERPL-MCNC: 100 MG/DL (ref 70–99)
HBA1C MFR BLD: 5.8 % (ref 0–5.6)
HCT VFR BLD AUTO: 43.1 % (ref 40–53)
HDLC SERPL-MCNC: 44 MG/DL
HGB BLD-MCNC: 13.8 G/DL (ref 13.3–17.7)
LDLC SERPL CALC-MCNC: 65 MG/DL
MCH RBC QN AUTO: 29.1 PG (ref 26.5–33)
MCHC RBC AUTO-ENTMCNC: 32 G/DL (ref 31.5–36.5)
MCV RBC AUTO: 91 FL (ref 78–100)
MICROALBUMIN UR-MCNC: <12 MG/L
MICROALBUMIN/CREAT UR: NORMAL MG/G{CREAT}
NONHDLC SERPL-MCNC: 89 MG/DL
PLATELET # BLD AUTO: 203 10E3/UL (ref 150–450)
POTASSIUM SERPL-SCNC: 4.1 MMOL/L (ref 3.4–5.3)
PROT SERPL-MCNC: 7.3 G/DL (ref 6.4–8.3)
PSA SERPL DL<=0.01 NG/ML-MCNC: 1.21 NG/ML
RBC # BLD AUTO: 4.75 10E6/UL (ref 4.4–5.9)
SODIUM SERPL-SCNC: 137 MMOL/L (ref 135–145)
TRIGL SERPL-MCNC: 119 MG/DL
WBC # BLD AUTO: 7.6 10E3/UL (ref 4–11)

## 2023-11-22 PROCEDURE — 82043 UR ALBUMIN QUANTITATIVE: CPT | Performed by: INTERNAL MEDICINE

## 2023-11-22 PROCEDURE — 90471 IMMUNIZATION ADMIN: CPT | Performed by: INTERNAL MEDICINE

## 2023-11-22 PROCEDURE — 90714 TD VACC NO PRESV 7 YRS+ IM: CPT | Performed by: INTERNAL MEDICINE

## 2023-11-22 PROCEDURE — G0103 PSA SCREENING: HCPCS | Performed by: INTERNAL MEDICINE

## 2023-11-22 PROCEDURE — 83036 HEMOGLOBIN GLYCOSYLATED A1C: CPT | Performed by: INTERNAL MEDICINE

## 2023-11-22 PROCEDURE — 82570 ASSAY OF URINE CREATININE: CPT | Performed by: INTERNAL MEDICINE

## 2023-11-22 PROCEDURE — 80061 LIPID PANEL: CPT | Performed by: INTERNAL MEDICINE

## 2023-11-22 PROCEDURE — 99214 OFFICE O/P EST MOD 30 MIN: CPT | Mod: 25 | Performed by: INTERNAL MEDICINE

## 2023-11-22 PROCEDURE — 85027 COMPLETE CBC AUTOMATED: CPT | Performed by: INTERNAL MEDICINE

## 2023-11-22 PROCEDURE — 36415 COLL VENOUS BLD VENIPUNCTURE: CPT | Performed by: INTERNAL MEDICINE

## 2023-11-22 PROCEDURE — 80053 COMPREHEN METABOLIC PANEL: CPT | Performed by: INTERNAL MEDICINE

## 2023-11-22 RX ORDER — PROPARACAINE HYDROCHLORIDE 5 MG/ML
1 SOLUTION/ DROPS OPHTHALMIC ONCE
Status: CANCELLED | OUTPATIENT
Start: 2023-11-22 | End: 2023-11-22

## 2023-11-22 RX ORDER — TAMSULOSIN HYDROCHLORIDE 0.4 MG/1
0.4 CAPSULE ORAL EVERY OTHER DAY
Qty: 90 CAPSULE | Refills: 0 | Status: SHIPPED | OUTPATIENT
Start: 2023-11-22 | End: 2024-02-12

## 2023-11-22 ASSESSMENT — PAIN SCALES - GENERAL: PAINLEVEL: NO PAIN (0)

## 2023-11-22 NOTE — PROGRESS NOTES
[address], [address]  [facility] [address]  Phone: [phone]  Primary Provider: Landon Rich  Pre-op Performing Provider: LANDON RICH      PREOPERATIVE EVALUATION:  Today's date: 11/22/2023    Nicky is a 83 year old, presenting for the following:  Pre-Op Exam         No data to display                Surgical Information:  General Information      Date: 11/27/2023 Time: 10:50 AM Status: Scheduled   Location: [facility] Room: [facility]: Ophthalmology   Patient class: Outpatient Case classification: Elective         Panel Information    Panel 1    Provider Role   Ankit Fitzpatrick MD Primary    Procedure Laterality Anesthesia   LEFT EYE PHACOEMULSIFICATION, CATARACT, WITH INTRAOCULAR LENS IMPLANT Left MAC with Topical          Where patient plans to recover: At home with family  Fax number for surgical facility: Note does not need to be faxed, will be available electronically in Epic.    Assessment & Plan     The proposed surgical procedure is considered LOW risk.    Problem List Items Addressed This Visit        Respiratory    RICHIE (obstructive sleep apnea)       Endocrine    IFG (impaired fasting glucose)    Relevant Orders    Hemoglobin A1c (Completed)       Circulatory    Hypertension, unspecified type    Relevant Orders    Comprehensive metabolic panel (BMP + Alb, Alk Phos, ALT, AST, Total. Bili, TP) (Completed)    CBC with platelets (Completed)    Albumin Random Urine Quantitative with Creat Ratio (Completed)    Coronary artery calcification seen on CT scan    Relevant Orders    Comprehensive metabolic panel (BMP + Alb, Alk Phos, ALT, AST, Total. Bili, TP) (Completed)    CBC with platelets (Completed)   Other Visit Diagnoses     Pre-operative general physical examination    -  Primary    Lower urinary obstructive symptom        Relevant Medications    tamsulosin (FLOMAX) 0.4 MG capsule    Other Relevant Orders    PSA, screen (Completed)     Hyperlipidemia LDL goal <100        Relevant Orders    Lipid panel reflex to direct LDL Fasting (Completed)    Need for vaccine for Td (tetanus-diphtheria)        Need for vaccination        Cataract of both eyes, unspecified cataract type                      - No identified additional risk factors other than previously addressed    Antiplatelet or Anticoagulation Medication Instructions:   - Patient is on no antiplatelet or anticoagulation medications.    Additional Medication Instructions:   - ACE/ARB: HOLD on day of surgery (minimum 11 hours for general anesthesia).    RECOMMENDATION:  APPROVAL GIVEN to proceed with proposed procedure, without further diagnostic evaluation.        Subjective       HPI related to upcoming procedure: Patient presented for preop clearance for cataract surgery.  Denies any chest pain dyspnea except with strenuous exertion.  Follows with cardiology, has dilated ascending aorta.  He has interstitial lung disease as well.  No history of DVTs or bleeding disorders.          11/21/2023    12:44 PM   Preop Questions   1. Have you ever had a heart attack or stroke? No   2. Have you ever had surgery on your heart or blood vessels, such as a stent placement, a coronary artery bypass, or surgery on an artery in your head, neck, heart, or legs? No   3. Do you have chest pain with activity? No   4. Do you have a history of  heart failure? No   5. Do you currently have a cold, bronchitis or symptoms of other infection? No   6. Do you have a cough, shortness of breath, or wheezing? No   7. Do you or anyone in your family have previous history of blood clots? No   8. Do you or does anyone in your family have a serious bleeding problem such as prolonged bleeding following surgeries or cuts? No   9. Have you ever had problems with anemia or been told to take iron pills? No   10. Have you had any abnormal blood loss such as black, tarry or bloody stools? No   11. Have you ever had a blood  transfusion? YES -    11a. Have you ever had a transfusion reaction? No   12. Are you willing to have a blood transfusion if it is medically needed before, during, or after your surgery? Yes   13. Have you or any of your relatives ever had problems with anesthesia? No   14. Do you have sleep apnea, excessive snoring or daytime drowsiness? YES -    14a. Do you have a CPAP machine? Yes   15. Do you have any artifical heart valves or other implanted medical devices like a pacemaker, defibrillator, or continuous glucose monitor? No   16. Do you have artificial joints? No   17. Are you allergic to latex? No       Health Care Directive:  Patient does not have a Health Care Directive or Living Will: Discussed advance care planning with patient; however, patient declined at this time.    Preoperative Review of :   reviewed - no record of controlled substances prescribed.      Status of Chronic Conditions:  CAD - Patient has a longstanding history of moderate-severe CAD. Patient denies recent chest pain or NTG use, denies exercise induced dyspnea or PND. Last Stress test , EKG .     HYPERTENSION - Patient has longstanding history of HTN , currently denies any symptoms referable to elevated blood pressure. Specifically denies chest pain, palpitations, dyspnea, orthopnea, PND or peripheral edema. Blood pressure readings have not been in normal range. Current medication regimen is as listed below. Patient denies any side effects of medication.     SLEEP PROBLEM - Patient has a longstanding history of snoring.. Patient has tried OTC medications with limited success.     Review of Systems  Constitutional, neuro, ENT, endocrine, pulmonary, cardiac, gastrointestinal, genitourinary, musculoskeletal, integument and psychiatric systems are negative, except as otherwise noted.    Patient Active Problem List    Diagnosis Date Noted     Nuclear senile cataract of both eyes 11/01/2023     Priority: Medium     IFG (impaired fasting  glucose) 11/23/2022     Priority: Medium     Hypertension, unspecified type 10/12/2019     Priority: Medium     Ascending aorta dilation (H24) 10/12/2019     Priority: Medium     Skin lesion 10/12/2019     Priority: Medium     RICHIE (obstructive sleep apnea) 10/12/2019     Priority: Medium     Coronary artery calcification seen on CT scan 10/12/2019     Priority: Medium     ILD (interstitial lung disease) (H) 10/12/2019     Priority: Medium     Exposure to asbestos 10/12/2019     Priority: Medium      Past Medical History:   Diagnosis Date     Abdominal aortic aneurysm (AAA) without rupture (H24) 10/12/2019     Hyperlipidemia LDL goal <100 10/12/2019     Hypertension, unspecified type 10/12/2019     RICHIE (obstructive sleep apnea) 10/12/2019     Past Surgical History:   Procedure Laterality Date     COLONOSCOPY  2017     Current Outpatient Medications   Medication Sig Dispense Refill     aspirin (ASA) 81 MG tablet Take 81 mg by mouth daily       atorvastatin (LIPITOR) 20 MG tablet Take 1 tablet (20 mg) by mouth daily 90 tablet 3     cyanocobalamin (VITAMIN B-12) 100 MCG tablet Take 100 mcg by mouth daily       losartan (COZAAR) 50 MG tablet Take 1 tablet (50 mg) by mouth daily 90 tablet 3     tamsulosin (FLOMAX) 0.4 MG capsule Take 1 capsule (0.4 mg) by mouth every other day Due for appointment. Please schedule: [phone] 90 capsule 0     Vitamin D3 (CHOLECALCIFEROL) 125 MCG (5000 UT) tablet Take by mouth daily       celecoxib (CELEBREX) 100 MG capsule Take 1 capsule (100 mg) by mouth 2 times daily for 10 days 20 capsule 0     tiZANidine (ZANAFLEX) 2 MG tablet Take 2 tablets (4 mg) by mouth 3 times daily as needed for muscle spasms 30 tablet 0       No Known Allergies     Social History     Tobacco Use     Smoking status: Former     Types: Pipe     Smokeless tobacco: Never     Tobacco comments:     pipe mainly stopped 5.5 yrs ago   Substance Use Topics     Alcohol use: Yes     Comment: occasional.     Family History  "  Problem Relation Age of Onset     Glaucoma Mother      Lung Cancer Father      Macular Degeneration No family hx of      History   Drug Use Unknown         Objective     BP (!) 155/80   Pulse 91   Temp 97.7  F (36.5  C) (Temporal)   Resp 16   Ht 1.651 m (5' 5\")   Wt 80.3 kg (177 lb)   BMI 29.45 kg/m      Physical Exam    GENERAL APPEARANCE: healthy, alert and no distress     EYES: EOMI,  PERRL     HENT: ear canals and TM's normal and nose and mouth without ulcers or lesions     NECK: no adenopathy, no asymmetry, masses, or scars and thyroid normal to palpation     RESP: lungs clear to auscultation - no rales, rhonchi or wheezes     CV: regular rates and rhythm, normal S1 S2, no S3 or S4 and no murmur, click or rub     ABDOMEN:  soft, nontender, no HSM or masses and bowel sounds normal     MS: extremities normal- no gross deformities noted, no evidence of inflammation in joints, FROM in all extremities.     SKIN: no suspicious lesions or rashes     NEURO: Normal strength and tone, sensory exam grossly normal, mentation intact and speech normal     PSYCH: mentation appears normal. and affect normal/bright     LYMPHATICS: No cervical adenopathy    Recent Labs   Lab Test 11/21/22  1351   HGB 13.7         POTASSIUM 4.1   CR 0.84   A1C 5.8*        Diagnostics:  Labs pending at this time.  Results will be reviewed when available.   No EKG required for low risk surgery (cataract, skin procedure, breast biopsy, etc).    Revised Cardiac Risk Index (RCRI):  The patient has the following serious cardiovascular risks for perioperative complications:   - No serious cardiac risks = 0 points     RCRI Interpretation: 0 points: Class I (very low risk - 0.4% complication rate)         Signed Electronically by: Tom Oneal MD  Copy of this evaluation report is provided to requesting physician.      "

## 2023-11-25 ENCOUNTER — ANESTHESIA EVENT (OUTPATIENT)
Dept: SURGERY | Facility: AMBULATORY SURGERY CENTER | Age: 83
End: 2023-11-25
Payer: MEDICARE

## 2023-11-25 ASSESSMENT — LIFESTYLE VARIABLES: TOBACCO_USE: 1

## 2023-11-25 NOTE — ANESTHESIA PREPROCEDURE EVALUATION
Anesthesia Pre-Procedure Evaluation    Patient: Nicky Hernández   MRN: [id number] : 1940        Procedure : Procedure(s):  LEFT EYE PHACOEMULSIFICATION, CATARACT, WITH INTRAOCULAR LENS IMPLANT          Past Medical History:   Diagnosis Date     Abdominal aortic aneurysm (AAA) without rupture (H24) 10/12/2019     Hyperlipidemia LDL goal <100 10/12/2019     Hypertension, unspecified type 10/12/2019     RICHIE (obstructive sleep apnea) 10/12/2019      Past Surgical History:   Procedure Laterality Date     COLONOSCOPY  2017      No Known Allergies   Social History     Tobacco Use     Smoking status: Former     Types: Pipe     Smokeless tobacco: Never     Tobacco comments:     pipe mainly stopped 5.5 yrs ago   Substance Use Topics     Alcohol use: Yes     Comment: occasional.      Wt Readings from Last 1 Encounters:   23 80.3 kg (177 lb)        Anesthesia Evaluation   Pt has had prior anesthetic. Type: General.        ROS/MED HX  ENT/Pulmonary: Comment: Interstitial lung disease 2/2 asbestos exposure    (+) sleep apnea, uses CPAP,              tobacco use, Past use,                      Neurologic:  - neg neurologic ROS     Cardiovascular: Comment: AAA, stable  Dilated ascending aorta, stable    (+) Dyslipidemia hypertension- -   -  - -                                 Previous cardiac testing   Echo: Date: 23 Results:  Left ventricular systolic function is normal.  The visual ejection fraction is 60-65%.  No regional wall motion abnormalities noted.  Ascending aorta measures 3.6cm compared to 4.2cm on prior study from   although image quality was suboptimal. The study was technically difficult.  Stress Test:  Date: Results:    ECG Reviewed:  Date: Results:    Cath:  Date: Results:      METS/Exercise Tolerance:     Hematologic:     (+)       history of blood transfusion, no previous transfusion reaction,        Musculoskeletal:  - neg musculoskeletal ROS     GI/Hepatic:  - neg GI/hepatic ROS    "  Renal/Genitourinary:  - neg Renal ROS     Endo:  - neg endo ROS     Psychiatric/Substance Use:  - neg psychiatric ROS     Infectious Disease:  - neg infectious disease ROS     Malignancy:  - neg malignancy ROS     Other:  - neg other ROS          Physical Exam    Airway  airway exam normal           Respiratory Devices and Support         Dental       (+) Modest Abnormalities - crowns, retainers, 1 or 2 missing teeth      Cardiovascular   cardiovascular exam normal          Pulmonary   pulmonary exam normal            OUTSIDE LABS:  CBC:   Lab Results   Component Value Date    WBC 7.6 11/22/2023    WBC 7.1 11/21/2022    HGB 13.8 11/22/2023    HGB 13.7 11/21/2022    HCT 43.1 11/22/2023    HCT 41.0 11/21/2022     11/22/2023     11/21/2022     BMP:   Lab Results   Component Value Date     11/22/2023     11/21/2022    POTASSIUM 4.1 11/22/2023    POTASSIUM 4.1 11/21/2022    CHLORIDE 101 11/22/2023    CHLORIDE 103 11/21/2022    CO2 26 11/22/2023    CO2 21 (L) 11/21/2022    BUN 15.0 11/22/2023    BUN 11.9 11/21/2022    CR 1.03 11/22/2023    CR 0.84 11/21/2022     (H) 11/22/2023     (H) 11/21/2022     COAGS: No results found for: \"PTT\", \"INR\", \"FIBR\"  POC: No results found for: \"BGM\", \"HCG\", \"HCGS\"  HEPATIC:   Lab Results   Component Value Date    ALBUMIN 4.4 11/22/2023    PROTTOTAL 7.3 11/22/2023    ALT 22 11/22/2023    AST 28 11/22/2023    ALKPHOS 93 11/22/2023    BILITOTAL 0.4 11/22/2023     OTHER:   Lab Results   Component Value Date    A1C 5.8 (H) 11/22/2023    JOSE FRANCISCO 10.0 11/22/2023       Anesthesia Plan    ASA Status:  3    NPO Status:  NPO Appropriate    Anesthesia Type: MAC.     - Reason for MAC: straight local not clinically adequate, immobility needed   Induction: Propofol.   Maintenance: TIVA.        Consents    Anesthesia Plan(s) and associated risks, benefits, and realistic alternatives discussed. Questions answered and patient/representative(s) expressed understanding.   "   - Discussed: Risks, Benefits and Alternatives for BOTH SEDATION and the PROCEDURE were discussed     - Discussed with:  Patient            Postoperative Care    Pain management: IV analgesics.   PONV prophylaxis: Background Propofol Infusion     Comments:           H&P reviewed: Unable to attach H&P to encounter due to EHR limitations. H&P Update: appropriate H&P reviewed, patient examined. No interval changes since H&P (within 30 days).         Sterling Rich MD

## 2023-11-26 RX ORDER — CYCLOPENTOLAT/TROPIC/PHENYLEPH 1%-1%-2.5%
1 DROPS (EA) OPHTHALMIC (EYE)
Status: CANCELLED | OUTPATIENT
Start: 2023-11-26

## 2023-11-26 RX ORDER — PROPARACAINE HYDROCHLORIDE 5 MG/ML
1 SOLUTION/ DROPS OPHTHALMIC ONCE
Status: CANCELLED | OUTPATIENT
Start: 2023-11-26 | End: 2023-11-26

## 2023-11-27 ENCOUNTER — ANESTHESIA (OUTPATIENT)
Dept: SURGERY | Facility: AMBULATORY SURGERY CENTER | Age: 83
End: 2023-11-27
Payer: MEDICARE

## 2023-11-27 ENCOUNTER — HOSPITAL ENCOUNTER (OUTPATIENT)
Facility: AMBULATORY SURGERY CENTER | Age: 83
Discharge: HOME | End: 2023-11-27
Attending: OPHTHALMOLOGY
Payer: MEDICARE

## 2023-11-27 VITALS
DIASTOLIC BLOOD PRESSURE: 70 MMHG | BODY MASS INDEX: 29.49 KG/M2 | SYSTOLIC BLOOD PRESSURE: 131 MMHG | HEIGHT: 65 IN | RESPIRATION RATE: 16 BRPM | TEMPERATURE: 97 F | HEART RATE: 55 BPM | WEIGHT: 177 LBS | OXYGEN SATURATION: 97 %

## 2023-11-27 DIAGNOSIS — Z48.810 AFTERCARE FOLLOWING SURGERY OF A SENSE ORGAN: Primary | ICD-10-CM

## 2023-11-27 PROCEDURE — 66982 XCAPSL CTRC RMVL CPLX WO ECP: CPT | Mod: LT | Performed by: OPHTHALMOLOGY

## 2023-11-27 PROCEDURE — 66982 XCAPSL CTRC RMVL CPLX WO ECP: CPT | Mod: LT

## 2023-11-27 DEVICE — IMPLANTABLE DEVICE: Type: IMPLANTABLE DEVICE | Site: EYE | Status: FUNCTIONAL

## 2023-11-27 RX ORDER — ACETAMINOPHEN 325 MG/1
975 TABLET ORAL ONCE
Status: COMPLETED | OUTPATIENT
Start: 2023-11-27 | End: 2023-11-27

## 2023-11-27 RX ORDER — OXYCODONE HYDROCHLORIDE 5 MG/1
10 TABLET ORAL
Status: DISCONTINUED | OUTPATIENT
Start: 2023-11-27 | End: 2023-11-28 | Stop reason: HOSPADM

## 2023-11-27 RX ORDER — SODIUM CHLORIDE, SODIUM LACTATE, POTASSIUM CHLORIDE, CALCIUM CHLORIDE 600; 310; 30; 20 MG/100ML; MG/100ML; MG/100ML; MG/100ML
INJECTION, SOLUTION INTRAVENOUS CONTINUOUS
Status: DISCONTINUED | OUTPATIENT
Start: 2023-11-27 | End: 2023-11-28 | Stop reason: HOSPADM

## 2023-11-27 RX ORDER — DICLOFENAC SODIUM 1 MG/ML
1 SOLUTION/ DROPS OPHTHALMIC
Status: COMPLETED | OUTPATIENT
Start: 2023-11-27 | End: 2023-11-27

## 2023-11-27 RX ORDER — SODIUM CHLORIDE, SODIUM LACTATE, POTASSIUM CHLORIDE, CALCIUM CHLORIDE 600; 310; 30; 20 MG/100ML; MG/100ML; MG/100ML; MG/100ML
INJECTION, SOLUTION INTRAVENOUS CONTINUOUS PRN
Status: DISCONTINUED | OUTPATIENT
Start: 2023-11-27 | End: 2023-11-27

## 2023-11-27 RX ORDER — LABETALOL HYDROCHLORIDE 5 MG/ML
10 INJECTION, SOLUTION INTRAVENOUS
Status: DISCONTINUED | OUTPATIENT
Start: 2023-11-27 | End: 2023-11-28 | Stop reason: HOSPADM

## 2023-11-27 RX ORDER — MOXIFLOXACIN IN NACL,ISO-OS/PF 0.3MG/0.3
SYRINGE (ML) INTRAOCULAR PRN
Status: DISCONTINUED | OUTPATIENT
Start: 2023-11-27 | End: 2023-11-27 | Stop reason: HOSPADM

## 2023-11-27 RX ORDER — HYDROMORPHONE HYDROCHLORIDE 1 MG/ML
0.4 INJECTION, SOLUTION INTRAMUSCULAR; INTRAVENOUS; SUBCUTANEOUS EVERY 5 MIN PRN
Status: DISCONTINUED | OUTPATIENT
Start: 2023-11-27 | End: 2023-11-28 | Stop reason: HOSPADM

## 2023-11-27 RX ORDER — PROPARACAINE HYDROCHLORIDE 5 MG/ML
1 SOLUTION/ DROPS OPHTHALMIC ONCE
Status: COMPLETED | OUTPATIENT
Start: 2023-11-27 | End: 2023-11-27

## 2023-11-27 RX ORDER — FENTANYL CITRATE 50 UG/ML
INJECTION, SOLUTION INTRAMUSCULAR; INTRAVENOUS PRN
Status: DISCONTINUED | OUTPATIENT
Start: 2023-11-27 | End: 2023-11-27

## 2023-11-27 RX ORDER — ONDANSETRON 4 MG/1
4 TABLET, ORALLY DISINTEGRATING ORAL EVERY 30 MIN PRN
Status: DISCONTINUED | OUTPATIENT
Start: 2023-11-27 | End: 2023-11-28 | Stop reason: HOSPADM

## 2023-11-27 RX ORDER — PREDNISOLONE ACETATE 10 MG/ML
1 SUSPENSION/ DROPS OPHTHALMIC 4 TIMES DAILY
Qty: 5 ML | Refills: 1 | Status: SHIPPED | OUTPATIENT
Start: 2023-11-27 | End: 2024-09-20

## 2023-11-27 RX ORDER — FENTANYL CITRATE 50 UG/ML
25 INJECTION, SOLUTION INTRAMUSCULAR; INTRAVENOUS EVERY 5 MIN PRN
Status: DISCONTINUED | OUTPATIENT
Start: 2023-11-27 | End: 2023-11-28 | Stop reason: HOSPADM

## 2023-11-27 RX ORDER — OFLOXACIN 3 MG/ML
1 SOLUTION/ DROPS OPHTHALMIC 4 TIMES DAILY
Qty: 5 ML | Refills: 0 | Status: SHIPPED | OUTPATIENT
Start: 2023-11-27 | End: 2024-09-20

## 2023-11-27 RX ORDER — OXYCODONE HYDROCHLORIDE 5 MG/1
5 TABLET ORAL
Status: DISCONTINUED | OUTPATIENT
Start: 2023-11-27 | End: 2023-11-28 | Stop reason: HOSPADM

## 2023-11-27 RX ORDER — BALANCED SALT SOLUTION 6.4; .75; .48; .3; 3.9; 1.7 MG/ML; MG/ML; MG/ML; MG/ML; MG/ML; MG/ML
SOLUTION OPHTHALMIC PRN
Status: DISCONTINUED | OUTPATIENT
Start: 2023-11-27 | End: 2023-11-27 | Stop reason: HOSPADM

## 2023-11-27 RX ORDER — ONDANSETRON 2 MG/ML
4 INJECTION INTRAMUSCULAR; INTRAVENOUS EVERY 30 MIN PRN
Status: DISCONTINUED | OUTPATIENT
Start: 2023-11-27 | End: 2023-11-28 | Stop reason: HOSPADM

## 2023-11-27 RX ORDER — FENTANYL CITRATE 50 UG/ML
50 INJECTION, SOLUTION INTRAMUSCULAR; INTRAVENOUS EVERY 5 MIN PRN
Status: DISCONTINUED | OUTPATIENT
Start: 2023-11-27 | End: 2023-11-28 | Stop reason: HOSPADM

## 2023-11-27 RX ORDER — HYDROMORPHONE HYDROCHLORIDE 1 MG/ML
0.2 INJECTION, SOLUTION INTRAMUSCULAR; INTRAVENOUS; SUBCUTANEOUS EVERY 5 MIN PRN
Status: DISCONTINUED | OUTPATIENT
Start: 2023-11-27 | End: 2023-11-28 | Stop reason: HOSPADM

## 2023-11-27 RX ORDER — MOXIFLOXACIN 5 MG/ML
1 SOLUTION/ DROPS OPHTHALMIC
Status: COMPLETED | OUTPATIENT
Start: 2023-11-27 | End: 2023-11-27

## 2023-11-27 RX ORDER — TETRACAINE HYDROCHLORIDE 5 MG/ML
SOLUTION OPHTHALMIC PRN
Status: DISCONTINUED | OUTPATIENT
Start: 2023-11-27 | End: 2023-11-27 | Stop reason: HOSPADM

## 2023-11-27 RX ORDER — LIDOCAINE 40 MG/G
CREAM TOPICAL
Status: DISCONTINUED | OUTPATIENT
Start: 2023-11-27 | End: 2023-11-28 | Stop reason: HOSPADM

## 2023-11-27 RX ORDER — CYCLOPENTOLAT/TROPIC/PHENYLEPH 1%-1%-2.5%
1 DROPS (EA) OPHTHALMIC (EYE)
Status: COMPLETED | OUTPATIENT
Start: 2023-11-27 | End: 2023-11-27

## 2023-11-27 RX ADMIN — ACETAMINOPHEN 975 MG: 325 TABLET ORAL at 09:42

## 2023-11-27 RX ADMIN — SODIUM CHLORIDE, SODIUM LACTATE, POTASSIUM CHLORIDE, CALCIUM CHLORIDE: 600; 310; 30; 20 INJECTION, SOLUTION INTRAVENOUS at 11:25

## 2023-11-27 RX ADMIN — DICLOFENAC SODIUM 1 DROP: 1 SOLUTION/ DROPS OPHTHALMIC at 09:41

## 2023-11-27 RX ADMIN — Medication 1 DROP: at 09:41

## 2023-11-27 RX ADMIN — Medication 1 DROP: at 09:51

## 2023-11-27 RX ADMIN — Medication 1 DROP: at 09:46

## 2023-11-27 RX ADMIN — MOXIFLOXACIN 1 DROP: 5 SOLUTION/ DROPS OPHTHALMIC at 09:46

## 2023-11-27 RX ADMIN — SODIUM CHLORIDE, SODIUM LACTATE, POTASSIUM CHLORIDE, CALCIUM CHLORIDE: 600; 310; 30; 20 INJECTION, SOLUTION INTRAVENOUS at 09:45

## 2023-11-27 RX ADMIN — MOXIFLOXACIN 1 DROP: 5 SOLUTION/ DROPS OPHTHALMIC at 09:51

## 2023-11-27 RX ADMIN — DICLOFENAC SODIUM 1 DROP: 1 SOLUTION/ DROPS OPHTHALMIC at 09:51

## 2023-11-27 RX ADMIN — DICLOFENAC SODIUM 1 DROP: 1 SOLUTION/ DROPS OPHTHALMIC at 09:46

## 2023-11-27 RX ADMIN — FENTANYL CITRATE 25 MCG: 50 INJECTION, SOLUTION INTRAMUSCULAR; INTRAVENOUS at 11:32

## 2023-11-27 RX ADMIN — PROPARACAINE HYDROCHLORIDE 1 DROP: 5 SOLUTION/ DROPS OPHTHALMIC at 09:41

## 2023-11-27 RX ADMIN — MOXIFLOXACIN 1 DROP: 5 SOLUTION/ DROPS OPHTHALMIC at 09:41

## 2023-11-27 NOTE — BRIEF OP NOTE
[facility]    Brief Operative Note    Pre-operative diagnosis: Nuclear senile cataract of both eyes [H25.13]  Post-operative diagnosis Same as pre-operative diagnosis    Procedure: LEFT EYE COMPLEX PHACOEMULSIFICATION, CATARACT, WITH INTRAOCULAR LENS IMPLANT, Left - Eye  ANTERIOR VITRECTOMY UNPLANNED, Left - Eye    Surgeon: Surgeon(s) and Role:     * Ankit Fitzpatrick MD - Primary     * Nilton Encinas MD - Fellow - Assisting  Anesthesia: MAC with Topical   Estimated Blood Loss: Minimal    Drains: None  Specimens: * No specimens in log *  Findings:   None.  Complications: None.  Implants:   Implant Name Type Inv. Item Serial No.  Lot No. LRB No. Used Action   EYE IMP IOL [employer] PCL MA60AC 22.0 - [id number] Lens/Eye Implant EYE IMP IOL [employer] PCL MA60AC 22.0 [id number] [employer]  Left 1 Implanted

## 2023-11-27 NOTE — ANESTHESIA POSTPROCEDURE EVALUATION
Patient: Nicky Hernández    Procedure: Procedure(s):  LEFT EYE COMPLEX PHACOEMULSIFICATION, CATARACT, WITH INTRAOCULAR LENS IMPLANT  ANTERIOR VITRECTOMY UNPLANNED       Anesthesia Type:  MAC    Note:  Disposition: Outpatient   Postop Pain Control: Uneventful            Sign Out: Well controlled pain   PONV: No   Neuro/Psych: Uneventful            Sign Out: Acceptable/Baseline neuro status   Airway/Respiratory: Uneventful            Sign Out: Acceptable/Baseline resp. status   CV/Hemodynamics: Uneventful            Sign Out: Acceptable CV status; No obvious hypovolemia; No obvious fluid overload   Other NRE: NONE   DID A NON-ROUTINE EVENT OCCUR? No       Last vitals:  Vitals Value Taken Time   /70 11/27/23 1305   Temp 36.1  C (97  F) 11/27/23 1305   Pulse 55 11/27/23 1305   Resp 16 11/27/23 1305   SpO2 97 % 11/27/23 1305       Electronically Signed By: Ca Salguero MD  November 27, 2023  1:25 PM

## 2023-11-27 NOTE — DISCHARGE INSTRUCTIONS
Dr. Ankit Hernandez  [facility]  [phone]  Post Operative Cataract Instructions    Wear the clear eye shield day and night for 2 days except for the time you are using your drops. Then only when sleeping for protection for another 5 days.    Do not rub the operated eye.    Light sensitivity may be noticed. Sunglasses may be worn for comfort.    Some discomfort and irritation may be noticed. Acetaminophen (Tylenol) or Ibuprofen (Advil) may be taken for discomfort. If pain persists please call Dr. Hernandez's office.    Keep the operated eye dry. You may wash your hair, bathe or shower, but keep the operated eye closed while doing so.     If you take glaucoma medications, bring them with you to the clinic on your first post operative visit.    Bring your prescribed eye drops with you to your scheduled post-operative appointment.    Use medication exactly as prescribed by your doctor. You may restart your regular home medications.     Call Dr. Hernandez's office at [phone] if any of the following should occur:    Any sudden vision changes, including decreased vision  Nausea or severe headache  Increase in pain not controlled  Signs of infection (pus, increasing redness or tenderness)  Severe sensitivity to light    [facility]  Home Care Following Anesthesia  For 24 hours after surgery:  Get plenty of rest.  A responsible adult must stay with you for at least 24 hours after you leave the surgery center.  Do not drive or use heavy equipment.  If you have weakness or tingling, don't drive or use heavy equipment until this feeling goes away.   Do not drink alcohol.   Avoid strenuous or risky activities.  Ask for help when climbing stairs.  You may feel lightheaded.  IF so, sit for a few minutes before standing.  Have someone help you get up.   If you have nausea (feel sick to your stomach): Drink only clear liquids such as apple juice, ginger ale, broth or 7-Up.  Rest  may also help.  Be sure to drink enough fluids.  Move to a regular diet as you feel able.   You may have a slight fever.  Call the doctor if your fever is over 100 F (37.7 C) (taken under the tongue) or lasts longer than 24 hours.  You may have a dry mouth, a sore throat, muscle aches or trouble sleeping. These should go away after 24 hours.  Do not make important or legal decisions.   It is recommended to avoid smoking.               Tips for taking pain medications  To get the best pain relief possible, remember these points:  Take pain medications as directed, before pain becomes severe.  Pain medication can upset your stomach: taking it with food may help.  Constipation is a common side effect of pain medication. Drink plenty of  fluids.  Eat foods high in fiber. Take a stool softener if recommended by your doctor or pharmacist.  Do not drink alcohol, drive or operate machinery while taking pain medications.  Ask about other ways to control pain, such as with heat, ice or relaxation.    Tylenol/Acetaminophen Consumption    If you feel your pain relief is insufficient, you may take Tylenol/Acetaminophen in addition to your narcotic pain medication.   Be careful not to exceed 4,000 mg of Tylenol/Acetaminophen in a 24 hour period from all sources.  If you are taking extra strength Tylenol/acetaminophen (500 mg), the maximum dose is 8 tablets in 24 hours.  If you are taking regular strength acetaminophen (325 mg), the maximum dose is 12 tablets in 24 hours.    Call a doctor for any of the following:  Signs of infection (fever, growing tenderness at the surgery site, a large amount of drainage or bleeding, severe pain, foul-smelling drainage, redness, swelling).  It has been over 8 to 10 hours since surgery and you are still not able to urinate (pass water).  Headache for over 24 hours.  Numbness, tingling or weakness the day after surgery (if you had spinal anesthesia).  Signs of Covid-19 infection (temperature over  100 degrees, shortness of breath, cough, loss of taste/smell, generalized body aches, persistent headache, chills, sore throat, nausea/vomiting/diarrhea)  Your doctor is:       Dr. Ankit Alcaraz, Ophthalmology: [phone]               Or dial [phone] and ask for the resident on call for:  Ophthalmology  For emergency care, call the:  [facility]:  [phone] (TTY for hearing impaired: [phone])

## 2023-11-27 NOTE — ANESTHESIA CARE TRANSFER NOTE
Patient: Nicky Hernández    Procedure: Procedure(s):  LEFT EYE COMPLEX PHACOEMULSIFICATION, CATARACT, WITH INTRAOCULAR LENS IMPLANT  ANTERIOR VITRECTOMY UNPLANNED       Diagnosis: Nuclear senile cataract of both eyes [H25.13]  Diagnosis Additional Information: No value filed.    Anesthesia Type:   MAC     Note:    Oropharynx: spontaneously breathing  Level of Consciousness: awake  Oxygen Supplementation: room air    Independent Airway: airway patency satisfactory and stable  Dentition: dentition unchanged  Vital Signs Stable: post-procedure vital signs reviewed and stable  Report to RN Given: handoff report given  Patient transferred to: Phase II    Handoff Report: Identifed the Patient, Identified the Reponsible Provider, Reviewed the pertinent medical history, Discussed the surgical course, Reviewed Intra-OP anesthesia mangement and issues during anesthesia, Set expectations for post-procedure period and Allowed opportunity for questions and acknowledgement of understanding      Vitals:  Vitals Value Taken Time   /82 11/27/23 1250   Temp 36.3  C (97.4  F) 11/27/23 1250   Pulse 58 11/27/23 1250   Resp 16 11/27/23 1250   SpO2 97 % 11/27/23 1250       Electronically Signed By: NADJA Mayorga CRNA  November 27, 2023  12:55 PM

## 2023-11-27 NOTE — OP NOTE
SURGEON: Ankit Hernandez MD, PhD  ASSISTANT SURGEON: Nilton Encinas MD, MPH    PREOPERATIVE DIAGNOSIS: Visually significant cataract, OS  POSTOPERATIVE DIAGNOSIS: same +severely miotic pupil left eye   PROCEDURE: complex cataract surgery with phacoemulsification with intraocular lens implantation and anterior vitrectomy, OS    ANESTHESIA: Monitored anesthesia care and topical anesthesia   COMPLICATIONS: none    Indication: Nicky Hernández is a 83 year old patient with diagnosis of visually significant cataract each eye,  here for cataract surgery left eye     DESCRIPTION OF THE PROCEDURE:  The patient was taken to the operative room where monitored anesthetic care and topical anesthesia were given     The operative eye was prepped and draped in the usual sterile surgical fashion for ophthalmic surgery, including the installation of one drop of 5% Povidone Iodine.  A sterile drape was placed over the face and body and a lid speculum was inserted.      With the use of a Supersharp blade , a paracentesis was created at the limbus. Lidocaine 2% solution was placed in the anterior chamber.  Viscoelastic was injected into the anterior chamber using a canula.  A 2.4 mm keratome was then used to construct a clear corneal incision at the 3 o'clock position.  A 6.25mm Malyugin ring was placed because the pupil was very miotic and there was not enough visualization of the capsule. Using Utrata forceps and cystotome needle, a continuous curvilinear capsulorrhexis was created and hydrodissection was undertaken with the use of BSS.  The nucleus was found to be freely mobile and then removed by phacoemulsification using chop technique. During this, a posterior capsular tear was noted. Viscoat was placed and automated anterior vitrectomy was performed. Another paracentesis was created with the super sharp blade at 10 o clock. The remaining elements of capsule and cortex were then removed successfully with anterior vit and  irrigation/aspiration-cut settings.  A 3-piece IOL was injected into the sulcus and was rotated into a good position. The malyugin was removed. A 10-0 nylon suture was placed. The remaining elements of viscoelastic were then removed with irrigation/aspiration. The wounds were hydrated and were watertight.  Intracemeral moxifloxacin was injected.     The lid speculum was removeThe eye was cleaned with wet and dry gauze. A clear shield were placed over the eye.  The patient was discharge in stable condition having tolerated the procedure well    The surgery was assisted by Dr. Nilton Encinas, because no qualified resident was available on the day of the surgery. Due to the delicate and complex nature of this surgery, Dr. Encinas was required. I performed the entire procedure myself. I was present for the entire surgery.    Ankit Hernandez MD, PhD    Implant Name Type Inv. Item Serial No.  Lot No. LRB No. Used Action   EYE IMP IOL TYLER PCL MA60AC 22.0 - [id number] Lens/Eye Implant EYE IMP IOL TYLER PCL MA60AC 22.0 [id number] [employer]  Left 1 Implanted

## 2023-11-28 ENCOUNTER — OFFICE VISIT (OUTPATIENT)
Dept: OPHTHALMOLOGY | Facility: CLINIC | Age: 83
End: 2023-11-28
Attending: OPHTHALMOLOGY
Payer: MEDICARE

## 2023-11-28 DIAGNOSIS — Z48.810 AFTERCARE FOLLOWING SURGERY OF A SENSE ORGAN: Primary | ICD-10-CM

## 2023-11-28 PROCEDURE — G0463 HOSPITAL OUTPT CLINIC VISIT: HCPCS | Performed by: OPHTHALMOLOGY

## 2023-11-28 PROCEDURE — 99024 POSTOP FOLLOW-UP VISIT: CPT | Performed by: OPHTHALMOLOGY

## 2023-11-28 RX ORDER — BRIMONIDINE TARTRATE 2 MG/ML
1 SOLUTION/ DROPS OPHTHALMIC 2 TIMES DAILY
Qty: 5 ML | Refills: 0 | Status: SHIPPED | OUTPATIENT
Start: 2023-11-28 | End: 2024-09-20

## 2023-11-28 ASSESSMENT — VISUAL ACUITY
OS_PH_SC: 20/50-/+2
METHOD: SNELLEN - LINEAR
OS_SC: 20/60-/+

## 2023-11-28 ASSESSMENT — SLIT LAMP EXAM - LIDS: COMMENTS: NORMAL

## 2023-11-28 ASSESSMENT — TONOMETRY
OS_IOP_MMHG: 26
IOP_METHOD: TONOPEN
IOP_METHOD: TONOPEN
OS_IOP_MMHG: 27

## 2023-11-28 ASSESSMENT — EXTERNAL EXAM - LEFT EYE: OS_EXAM: NORMAL

## 2023-11-28 NOTE — PROGRESS NOTES
Postoperative day 1 status post CE IOL (complex catarqct surgery with anti vitx) left eye 11/27/23    Slept well  Minimal surface irritation  1-2 occasional floaters  Mild corneal edema and well centered IOL; small pupil  Mild AC inflammation  IOP 27  Retina attached; periphery unremarkable as far as it is visible through a small pupil  Doing well    Plan:  No heavy lifting   Shield at night  Retina detachment and endophthalmitis precautions were discussed with the patient and was asked to return if any of the those occur    Medications to operative eye    Prednisolone (white or pink top) 4/day right eye   Ofloxacin (tan top) 4/day right eye     Brimonidine (purple top) 2/day right eye       Complete documentation of historical and exam elements from today's encounter can be found in the full encounter summary report (not reduplicated in this progress note). I personally obtained the chief complaint(s) and history of present illness.  I confirmed and edited as necessary the review of systems, past medical/surgical history, family history, social history, and examination findings as documented by others; and I examined the patient myself. I personally reviewed the relevant tests, images, and reports as documented above. I formulated and edited as necessary the assessment and plan and discussed the findings and management plan with the patient and family.    Ankit Hernandez MD  , Vitreoretinal surgery   Department of Ophthalmology  [facility]

## 2023-11-30 ASSESSMENT — ENCOUNTER SYMPTOMS
PALPITATIONS: 0
DIARRHEA: 0
SHORTNESS OF BREATH: 0
CHILLS: 0
ABDOMINAL PAIN: 0
HEADACHES: 0
NAUSEA: 0
DIZZINESS: 0
CONSTIPATION: 0
HEARTBURN: 0
WEAKNESS: 0
PARESTHESIAS: 0
SORE THROAT: 0
EYE PAIN: 0
FREQUENCY: 0
JOINT SWELLING: 0
HEMATOCHEZIA: 0
HEMATURIA: 0
NERVOUS/ANXIOUS: 0
FEVER: 0
DYSURIA: 0
MYALGIAS: 0
COUGH: 0
ARTHRALGIAS: 0

## 2023-11-30 ASSESSMENT — ACTIVITIES OF DAILY LIVING (ADL): CURRENT_FUNCTION: NO ASSISTANCE NEEDED

## 2023-12-01 ENCOUNTER — VIRTUAL VISIT (OUTPATIENT)
Dept: FAMILY MEDICINE | Facility: CLINIC | Age: 83
End: 2023-12-01
Payer: MEDICARE

## 2023-12-01 DIAGNOSIS — I25.10 CORONARY ARTERY CALCIFICATION SEEN ON CT SCAN: ICD-10-CM

## 2023-12-01 DIAGNOSIS — I77.810 ASCENDING AORTA DILATION (H): ICD-10-CM

## 2023-12-01 DIAGNOSIS — Z00.00 ROUTINE HISTORY AND PHYSICAL EXAMINATION OF ADULT: Primary | ICD-10-CM

## 2023-12-01 DIAGNOSIS — N40.0 BPH WITHOUT OBSTRUCTION/LOWER URINARY TRACT SYMPTOMS: ICD-10-CM

## 2023-12-01 DIAGNOSIS — I10 HYPERTENSION, UNSPECIFIED TYPE: ICD-10-CM

## 2023-12-01 DIAGNOSIS — G47.33 OSA (OBSTRUCTIVE SLEEP APNEA): ICD-10-CM

## 2023-12-01 DIAGNOSIS — E78.5 HYPERLIPIDEMIA LDL GOAL <100: ICD-10-CM

## 2023-12-01 PROBLEM — I71.40 ABDOMINAL AORTIC ANEURYSM (AAA) WITHOUT RUPTURE (H): Status: ACTIVE | Noted: 2023-12-01

## 2023-12-01 PROCEDURE — 99214 OFFICE O/P EST MOD 30 MIN: CPT | Mod: 95 | Performed by: INTERNAL MEDICINE

## 2023-12-01 PROCEDURE — G0439 PPPS, SUBSEQ VISIT: HCPCS | Mod: 95 | Performed by: INTERNAL MEDICINE

## 2023-12-01 ASSESSMENT — ENCOUNTER SYMPTOMS
HEMATOCHEZIA: 0
CHILLS: 0
SHORTNESS OF BREATH: 0
FEVER: 0
SORE THROAT: 0
DYSURIA: 0
WEAKNESS: 0
ABDOMINAL PAIN: 0
EYE PAIN: 0
DIZZINESS: 0
ARTHRALGIAS: 0
NERVOUS/ANXIOUS: 0
NAUSEA: 0
HEADACHES: 0
HEARTBURN: 0
PALPITATIONS: 0
DIARRHEA: 0
HEMATURIA: 0
CONSTIPATION: 0
MYALGIAS: 0
JOINT SWELLING: 0
FREQUENCY: 0
COUGH: 0
PARESTHESIAS: 0

## 2023-12-01 ASSESSMENT — ACTIVITIES OF DAILY LIVING (ADL): CURRENT_FUNCTION: NO ASSISTANCE NEEDED

## 2023-12-01 NOTE — PROGRESS NOTES
"SUBJECTIVE:   Nicky is a 83 year old, presenting for the following:  No chief complaint on file.         No data to display                Are you in the first 12 months of your Medicare coverage?  No    Healthy Habits:     In general, how would you rate your overall health?  Good    Frequency of exercise:  2-3 days/week    Duration of exercise:  N/A    Do you usually eat at least 4 servings of fruit and vegetables a day, include whole grains    & fiber and avoid regularly eating high fat or \"junk\" foods?  Yes    Taking medications regularly:  Yes    Medication side effects:  None    Ability to successfully perform activities of daily living:  No assistance needed    Home Safety:  No safety concerns identified    Hearing Impairment:  No hearing concerns    In the past 6 months, have you been bothered by leaking of urine?  No    In general, how would you rate your overall mental or emotional health?  Excellent    Additional concerns today:  No      Today's PHQ-2 Score:       11/30/2023     4:11 PM   PHQ-2 ( 1999 [employer])   Q1: Little interest or pleasure in doing things 0   Q2: Feeling down, depressed or hopeless 0   PHQ-2 Score 0   Q1: Little interest or pleasure in doing things Not at all   Q2: Feeling down, depressed or hopeless Not at all   PHQ-2 Score 0           Have you ever done Advance Care Planning? (For example, a Health Directive, POLST, or a discussion with a medical provider or your loved ones about your wishes): No, advance care planning information given to patient to review.  Patient plans to discuss their wishes with loved ones or provider.      Fall risk  Fallen 2 or more times in the past year?: No  Any fall with injury in the past year?: No    Cognitive Screening Unable to complete due to virtual visit; need for additional assessment in future face-to-face visit    Do you have sleep apnea, excessive snoring or daytime drowsiness? : no    Reviewed and updated as needed this visit by clinical " staff      Problems  Med Hx            Reviewed and updated as needed this visit by Provider      Problems  Med Hx           Social History     Tobacco Use    Smoking status: Former     Types: Pipe    Smokeless tobacco: Never    Tobacco comments:     pipe mainly stopped 5.5 yrs ago   Substance Use Topics    Alcohol use: Yes     Comment: occasional.             11/30/2023     4:10 PM   Alcohol Use   Prescreen: >3 drinks/day or >7 drinks/week? No          No data to display              Do you have a current opioid prescription? No  Do you use any other controlled substances or medications that are not prescribed by a provider? None      Colonoscopy done on this date: Dec 29, 2017 (approximately), by this group: [email or web address], results were NEGATIVE.     Current providers sharing in care for this patient include:   Patient Care Team:  Tom Oneal MD as PCP - General (Internal Medicine)  Tom Oneal MD (Internal Medicine)  Justo Connolly MD as Assigned Heart and Vascular Provider  Goltz, Bennett Ezra, PA-C as Assigned Neuroscience Provider  Torsten Sood DO as Assigned Musculoskeletal Provider  Catalino Cardona OD (Optometry)  Arcelai Alvarez PA-C as Assigned PCP  Ankit Fitzpatrick MD as Assigned Surgical Provider    The following health maintenance items are reviewed in Epic and correct as of today:  Health Maintenance   Topic Date Due    ZOSTER IMMUNIZATION (1 of 2) Never done    RSV VACCINE (Pregnancy & 60+) (1 - 1-dose 60+ series) Never done    ANNUAL REVIEW OF HM ORDERS  11/21/2023    MEDICARE ANNUAL WELLNESS VISIT  12/01/2024    FALL RISK ASSESSMENT  12/01/2024    ADVANCE CARE PLANNING  12/01/2028    PHQ-2 (once per calendar year)  Completed    INFLUENZA VACCINE  Completed    Pneumococcal Vaccine: 65+ Years  Completed    COVID-19 Vaccine  Completed    IPV IMMUNIZATION  Aged Out    HPV IMMUNIZATION  Aged Out    MENINGITIS IMMUNIZATION  Aged Out    RSV MONOCLONAL ANTIBODY   Aged Out    DTAP/TDAP/TD IMMUNIZATION  Discontinued     Lab work is in process  Labs reviewed in EPIC  BP Readings from Last 3 Encounters:   11/27/23 131/70   11/22/23 (!) 155/80   06/12/23 127/73    Wt Readings from Last 3 Encounters:   11/27/23 80.3 kg (177 lb)   11/22/23 80.3 kg (177 lb)   06/12/23 83 kg (183 lb)                  Patient Active Problem List   Diagnosis    Hypertension, unspecified type    Ascending aorta dilation (H24)    Skin lesion    RICHIE (obstructive sleep apnea)    Coronary artery calcification seen on CT scan    ILD (interstitial lung disease) (H)    Exposure to asbestos    IFG (impaired fasting glucose)    Nuclear senile cataract of both eyes    Abdominal aortic aneurysm (AAA) without rupture (H24)    Hyperlipidemia LDL goal <100     Past Surgical History:   Procedure Laterality Date    ANTERIOR VITRECTOMY UNPLANNED Left 11/27/2023    Procedure: ANTERIOR VITRECTOMY UNPLANNED;  Surgeon: Ankit Fitzpatrick MD;  Location: [facility] [place]    COLONOSCOPY  2017    PHACOEMULSIFICATION CLEAR CORNEA WITH STANDARD INTRAOCULAR LENS IMPLANT Left 11/27/2023    Procedure: LEFT EYE COMPLEX PHACOEMULSIFICATION, CATARACT, WITH INTRAOCULAR LENS IMPLANT;  Surgeon: Ankit Fitzpatrick MD;  Location: [facility] [place]       Social History     Tobacco Use    Smoking status: Former     Types: Pipe    Smokeless tobacco: Never    Tobacco comments:     pipe mainly stopped 5.5 yrs ago   Substance Use Topics    Alcohol use: Yes     Comment: occasional.     Family History   Problem Relation Age of Onset    Glaucoma Mother     Lung Cancer Father     Macular Degeneration No family hx of          Current Outpatient Medications   Medication Sig Dispense Refill    aspirin (ASA) 81 MG tablet Take 81 mg by mouth daily      atorvastatin (LIPITOR) 20 MG tablet Take 1 tablet (20 mg) by mouth daily 90 tablet 3    brimonidine (ALPHAGAN) 0.2 % ophthalmic solution Place 1 drop Into the left eye 2 times daily 5 mL 0    losartan  (COZAAR) 50 MG tablet Take 1 tablet (50 mg) by mouth daily 90 tablet 3    ofloxacin (OCUFLOX) 0.3 % ophthalmic solution Place 1 drop Into the left eye 4 times daily 5 mL 0    prednisoLONE acetate (PRED FORTE) 1 % ophthalmic suspension Place 1 drop Into the left eye 4 times daily 5 mL 1    tamsulosin (FLOMAX) 0.4 MG capsule Take 1 capsule (0.4 mg) by mouth every other day Due for appointment. Please schedule: [phone] 90 capsule 0    Vitamin D3 (CHOLECALCIFEROL) 125 MCG (5000 UT) tablet Take by mouth daily       No Known Allergies  Recent Labs   Lab Test 11/22/23  0806 11/21/22  1351 10/22/21  0814 10/22/21  0814 02/12/21  1211 10/12/20  0922   A1C 5.8* 5.8*  --   --   --   --    LDL 65 87  --  73 38 117*   HDL 44 39*  --  44 46 41   TRIG 119 252*  --  129 124 161*   ALT 22  --   --  35 44 23   CR 1.03 0.84   < > 0.92 0.96 0.86   GFRESTIMATED 73 88   < > 78 75 82   GFRESTBLACK  --   --   --   --  86 >90   POTASSIUM 4.1 4.1  --  4.1 4.4 4.1    < > = values in this interval not displayed.              Review of Systems   Constitutional:  Negative for chills and fever.   HENT:  Negative for congestion, ear pain, hearing loss and sore throat.    Eyes:  Negative for pain and visual disturbance.   Respiratory:  Negative for cough and shortness of breath.    Cardiovascular:  Negative for chest pain, palpitations and peripheral edema.   Gastrointestinal:  Negative for abdominal pain, constipation, diarrhea, heartburn, hematochezia and nausea.   Genitourinary:  Positive for impotence. Negative for dysuria, frequency, genital sores, hematuria, penile discharge and urgency.   Musculoskeletal:  Negative for arthralgias, joint swelling and myalgias.   Skin:  Negative for rash.   Neurological:  Negative for dizziness, weakness, headaches and paresthesias.   Psychiatric/Behavioral:  Negative for mood changes. The patient is not nervous/anxious.      On flomax eases flow, TAKES DAILY,     Nocturia 1-2 x a night, using CPAP, last  "18 yrs  BP has been good, normal 135 systolic,   On atorvastatin and ASA 81    Shingrix pending    Memory perfect    HBA1C 5.8    wt 173- 175 lb, checks Wt every day    Aortic dilation low threshold to intervene, at 4.5 cm , given Fhx, avoid strenuous work      OBJECTIVE:   There were no vitals taken for this visit. Estimated body mass index is 29.45 kg/m  as calculated from the following:    Height as of 11/27/23: 1.651 m (5' 5\").    Weight as of 11/27/23: 80.3 kg (177 lb).  Physical Exam  GENERAL: healthy, alert and no distress  EYES: Eyes grossly normal to inspection, PERRL and conjunctivae and sclerae normal  HENT: ear canals and TM's normal, nose and mouth without ulcers or lesions  NECK: no adenopathy, no asymmetry, masses, or scars and thyroid normal to palpation  RESP: lungs clear to auscultation - no rales, rhonchi or wheezes  CV: regular rate and rhythm, normal S1 S2, no S3 or S4, no murmur, click or rub, no peripheral edema and peripheral pulses strong  ABDOMEN: soft, nontender, no hepatosplenomegaly, no masses and bowel sounds normal  MS: no gross musculoskeletal defects noted, no edema  SKIN: no suspicious lesions or rashes  NEURO: Normal strength and tone, mentation intact and speech normal  PSYCH: mentation appears normal, affect normal/bright    Diagnostic Test Results:  Labs reviewed in Epic    ASSESSMENT / PLAN:   Diagnoses and all orders for this visit:    Routine history and physical examination of adult    Hyperlipidemia LDL goal <100    RICHIE (obstructive sleep apnea)    Hypertension, unspecified type    Coronary artery calcification seen on CT scan    Ascending aorta dilation (H24)    BPH without obstruction/lower urinary tract symptoms  Comments:  On Flomax has been taking daily helps with his urinary symptoms    Goal pressures less than 130/80 given family history of aortic aneurysm.  [Nephew].  Follows with cardiology ascending aortic dilatation has regressed to 3.6.  As per cardiology low " "threshold to intervene at 4.5 cm of ascending aortic dilation.  Patient made aware of such.  Patient will call us with blood pressure readings.  Patient will call us with blood pressure readings, room to increase losartan to 75 or 100 mg  Uses CPAP daily.  Labs are at goal  Salt restriction.,  Follow-up with cardiology in June 2024.  On statins for hyperlipidemia keep same dose.  Has been taking aspirin 81 mg daily.  Weight ranges between 173 to 75 pounds work on weight low-fat low-cholesterol diet decrease portions of meals exercise as tolerated.    Patient has been advised of split billing requirements and indicates understanding: Yes      COUNSELING:  Reviewed preventive health counseling, as reflected in patient instructions       Regular exercise       Healthy diet/nutrition       Vision screening       Hearing screening       Dental care       Bladder control       Fall risk prevention       Aspirin prophylaxis        Colon cancer screening       Prostate cancer screening      BMI:   Estimated body mass index is 29.45 kg/m  as calculated from the following:    Height as of 11/27/23: 1.651 m (5' 5\").    Weight as of 11/27/23: 80.3 kg (177 lb).   Weight management plan: Discussed healthy diet and exercise guidelines      He reports that he has quit smoking. His smoking use included pipe. He has never used smokeless tobacco.      Appropriate preventive services were discussed with this patient, including applicable screening as appropriate for fall prevention, nutrition, physical activity, Tobacco-use cessation, weight loss and cognition.  Checklist reviewing preventive services available has been given to the patient.    Reviewed patients plan of care and provided an AVS. The Basic Care Plan (routine screening as documented in Health Maintenance) for Sahip meets the Care Plan requirement. This Care Plan has been established and reviewed with the Patient.          Tom Oneal MD  [facility] " [facility]    Identified Health Risks:  I have reviewed Opioid Use Disorder and Substance Use Disorder risk factors and made any needed referrals.

## 2023-12-04 ENCOUNTER — ANESTHESIA (OUTPATIENT)
Dept: SURGERY | Facility: AMBULATORY SURGERY CENTER | Age: 83
End: 2023-12-04
Payer: MEDICARE

## 2023-12-04 ENCOUNTER — HOSPITAL ENCOUNTER (OUTPATIENT)
Facility: AMBULATORY SURGERY CENTER | Age: 83
Discharge: HOME | End: 2023-12-04
Attending: OPHTHALMOLOGY
Payer: MEDICARE

## 2023-12-04 VITALS
HEART RATE: 59 BPM | BODY MASS INDEX: 27.97 KG/M2 | RESPIRATION RATE: 16 BRPM | WEIGHT: 174 LBS | TEMPERATURE: 97.1 F | DIASTOLIC BLOOD PRESSURE: 79 MMHG | SYSTOLIC BLOOD PRESSURE: 150 MMHG | HEIGHT: 66 IN | OXYGEN SATURATION: 96 %

## 2023-12-04 DIAGNOSIS — H25.13 NUCLEAR SENILE CATARACT OF BOTH EYES: Primary | ICD-10-CM

## 2023-12-04 PROCEDURE — 66982 XCAPSL CTRC RMVL CPLX WO ECP: CPT | Mod: 79 | Performed by: OPHTHALMOLOGY

## 2023-12-04 PROCEDURE — 66982 XCAPSL CTRC RMVL CPLX WO ECP: CPT | Mod: RT

## 2023-12-04 DEVICE — LENS CC60WF 24.0 CLAREON UV ASPHERIC BICONVEX IOL: Type: IMPLANTABLE DEVICE | Site: EYE | Status: FUNCTIONAL

## 2023-12-04 RX ORDER — MOXIFLOXACIN 5 MG/ML
1 SOLUTION/ DROPS OPHTHALMIC 3 TIMES DAILY
Qty: 3 ML | Refills: 0 | Status: SHIPPED | OUTPATIENT
Start: 2023-12-04 | End: 2023-12-04

## 2023-12-04 RX ORDER — OXYCODONE HYDROCHLORIDE 5 MG/1
5 TABLET ORAL
Status: DISCONTINUED | OUTPATIENT
Start: 2023-12-04 | End: 2023-12-05 | Stop reason: HOSPADM

## 2023-12-04 RX ORDER — ONDANSETRON 4 MG/1
4 TABLET, ORALLY DISINTEGRATING ORAL EVERY 30 MIN PRN
Status: DISCONTINUED | OUTPATIENT
Start: 2023-12-04 | End: 2023-12-05 | Stop reason: HOSPADM

## 2023-12-04 RX ORDER — MOXIFLOXACIN 5 MG/ML
1 SOLUTION/ DROPS OPHTHALMIC
Status: COMPLETED | OUTPATIENT
Start: 2023-12-04 | End: 2023-12-04

## 2023-12-04 RX ORDER — PREDNISOLONE ACETATE 10 MG/ML
1 SUSPENSION/ DROPS OPHTHALMIC 4 TIMES DAILY
Qty: 5 ML | Refills: 0 | Status: SHIPPED | OUTPATIENT
Start: 2023-12-04 | End: 2024-09-20

## 2023-12-04 RX ORDER — ACETAMINOPHEN 325 MG/1
975 TABLET ORAL ONCE
Status: DISCONTINUED | OUTPATIENT
Start: 2023-12-04 | End: 2023-12-05 | Stop reason: HOSPADM

## 2023-12-04 RX ORDER — ACETAMINOPHEN 325 MG/1
975 TABLET ORAL ONCE
Status: COMPLETED | OUTPATIENT
Start: 2023-12-04 | End: 2023-12-04

## 2023-12-04 RX ORDER — FENTANYL CITRATE 50 UG/ML
INJECTION, SOLUTION INTRAMUSCULAR; INTRAVENOUS PRN
Status: DISCONTINUED | OUTPATIENT
Start: 2023-12-04 | End: 2023-12-04

## 2023-12-04 RX ORDER — MOXIFLOXACIN IN NACL,ISO-OS/PF 0.3MG/0.3
SYRINGE (ML) INTRAOCULAR PRN
Status: DISCONTINUED | OUTPATIENT
Start: 2023-12-04 | End: 2023-12-04 | Stop reason: HOSPADM

## 2023-12-04 RX ORDER — PROPARACAINE HYDROCHLORIDE 5 MG/ML
1 SOLUTION/ DROPS OPHTHALMIC ONCE
Status: COMPLETED | OUTPATIENT
Start: 2023-12-04 | End: 2023-12-04

## 2023-12-04 RX ORDER — SODIUM CHLORIDE, SODIUM LACTATE, POTASSIUM CHLORIDE, CALCIUM CHLORIDE 600; 310; 30; 20 MG/100ML; MG/100ML; MG/100ML; MG/100ML
INJECTION, SOLUTION INTRAVENOUS CONTINUOUS PRN
Status: DISCONTINUED | OUTPATIENT
Start: 2023-12-04 | End: 2023-12-04

## 2023-12-04 RX ORDER — BALANCED SALT SOLUTION 6.4; .75; .48; .3; 3.9; 1.7 MG/ML; MG/ML; MG/ML; MG/ML; MG/ML; MG/ML
SOLUTION OPHTHALMIC PRN
Status: DISCONTINUED | OUTPATIENT
Start: 2023-12-04 | End: 2023-12-04 | Stop reason: HOSPADM

## 2023-12-04 RX ORDER — ONDANSETRON 2 MG/ML
4 INJECTION INTRAMUSCULAR; INTRAVENOUS EVERY 30 MIN PRN
Status: DISCONTINUED | OUTPATIENT
Start: 2023-12-04 | End: 2023-12-05 | Stop reason: HOSPADM

## 2023-12-04 RX ORDER — KETOROLAC TROMETHAMINE 5 MG/ML
1 SOLUTION OPHTHALMIC 4 TIMES DAILY
Qty: 5 ML | Refills: 0 | Status: SHIPPED | OUTPATIENT
Start: 2023-12-04 | End: 2024-09-20

## 2023-12-04 RX ORDER — TETRACAINE HYDROCHLORIDE 5 MG/ML
SOLUTION OPHTHALMIC PRN
Status: DISCONTINUED | OUTPATIENT
Start: 2023-12-04 | End: 2023-12-04 | Stop reason: HOSPADM

## 2023-12-04 RX ORDER — OXYCODONE HYDROCHLORIDE 5 MG/1
10 TABLET ORAL
Status: DISCONTINUED | OUTPATIENT
Start: 2023-12-04 | End: 2023-12-05 | Stop reason: HOSPADM

## 2023-12-04 RX ORDER — SODIUM CHLORIDE, SODIUM LACTATE, POTASSIUM CHLORIDE, CALCIUM CHLORIDE 600; 310; 30; 20 MG/100ML; MG/100ML; MG/100ML; MG/100ML
INJECTION, SOLUTION INTRAVENOUS CONTINUOUS
Status: DISCONTINUED | OUTPATIENT
Start: 2023-12-04 | End: 2023-12-05 | Stop reason: HOSPADM

## 2023-12-04 RX ORDER — FENTANYL CITRATE 50 UG/ML
50 INJECTION, SOLUTION INTRAMUSCULAR; INTRAVENOUS
Status: DISCONTINUED | OUTPATIENT
Start: 2023-12-04 | End: 2023-12-05 | Stop reason: HOSPADM

## 2023-12-04 RX ORDER — PROPARACAINE HYDROCHLORIDE 5 MG/ML
1 SOLUTION/ DROPS OPHTHALMIC ONCE
Status: DISCONTINUED | OUTPATIENT
Start: 2023-12-04 | End: 2023-12-05 | Stop reason: HOSPADM

## 2023-12-04 RX ORDER — LIDOCAINE 40 MG/G
CREAM TOPICAL
Status: DISCONTINUED | OUTPATIENT
Start: 2023-12-04 | End: 2023-12-05 | Stop reason: HOSPADM

## 2023-12-04 RX ORDER — DICLOFENAC SODIUM 1 MG/ML
1 SOLUTION/ DROPS OPHTHALMIC
Status: COMPLETED | OUTPATIENT
Start: 2023-12-04 | End: 2023-12-04

## 2023-12-04 RX ORDER — CYCLOPENTOLAT/TROPIC/PHENYLEPH 1%-1%-2.5%
1 DROPS (EA) OPHTHALMIC (EYE)
Status: COMPLETED | OUTPATIENT
Start: 2023-12-04 | End: 2023-12-04

## 2023-12-04 RX ADMIN — MOXIFLOXACIN 1 DROP: 5 SOLUTION/ DROPS OPHTHALMIC at 09:04

## 2023-12-04 RX ADMIN — SODIUM CHLORIDE, SODIUM LACTATE, POTASSIUM CHLORIDE, CALCIUM CHLORIDE: 600; 310; 30; 20 INJECTION, SOLUTION INTRAVENOUS at 09:28

## 2023-12-04 RX ADMIN — FENTANYL CITRATE 25 MCG: 50 INJECTION, SOLUTION INTRAMUSCULAR; INTRAVENOUS at 09:32

## 2023-12-04 RX ADMIN — Medication 1 DROP: at 08:53

## 2023-12-04 RX ADMIN — MOXIFLOXACIN 1 DROP: 5 SOLUTION/ DROPS OPHTHALMIC at 08:53

## 2023-12-04 RX ADMIN — MOXIFLOXACIN 1 DROP: 5 SOLUTION/ DROPS OPHTHALMIC at 08:59

## 2023-12-04 RX ADMIN — DICLOFENAC SODIUM 1 DROP: 1 SOLUTION/ DROPS OPHTHALMIC at 09:20

## 2023-12-04 RX ADMIN — SODIUM CHLORIDE, SODIUM LACTATE, POTASSIUM CHLORIDE, CALCIUM CHLORIDE: 600; 310; 30; 20 INJECTION, SOLUTION INTRAVENOUS at 08:59

## 2023-12-04 RX ADMIN — Medication 1 DROP: at 08:58

## 2023-12-04 RX ADMIN — Medication 1 DROP: at 09:04

## 2023-12-04 RX ADMIN — ACETAMINOPHEN 975 MG: 325 TABLET ORAL at 08:50

## 2023-12-04 RX ADMIN — PROPARACAINE HYDROCHLORIDE 1 DROP: 5 SOLUTION/ DROPS OPHTHALMIC at 08:52

## 2023-12-04 RX ADMIN — DICLOFENAC SODIUM 1 DROP: 1 SOLUTION/ DROPS OPHTHALMIC at 09:13

## 2023-12-04 RX ADMIN — DICLOFENAC SODIUM 1 DROP: 1 SOLUTION/ DROPS OPHTHALMIC at 09:08

## 2023-12-04 RX ADMIN — FENTANYL CITRATE 25 MCG: 50 INJECTION, SOLUTION INTRAMUSCULAR; INTRAVENOUS at 09:41

## 2023-12-04 NOTE — BRIEF OP NOTE
[facility] [place]    Brief Operative Note    Pre-operative diagnosis: Nuclear senile cataract of both eyes [H25.13]  Post-operative diagnosis Same as pre-operative diagnosis    Procedure: RIGHT EYE PHACOEMULSIFICATION, CATARACT, WITH INTRAOCULAR LENS IMPLANT, Right - Eye    Surgeon: Surgeon(s) and Role:     * Ankit Fitzpatrick MD - Primary     * Sherie Valencia MD - Resident - Assisting     * Presley Feliciano MD - Fellow - Observing  Anesthesia: MAC with Topical   Estimated Blood Loss: None    Drains: None  Specimens: * No specimens in log *  Findings:   None.  Complications: None.  Implants:   Implant Name Type Inv. Item Serial No.  Lot No. LRB No. Used Action   LENS [id number] 24.0 [employer] UV ASPHERIC BICONVEX IOL - [id number] Lens/Eye Implant LENS CC60WF 24.0 CLARE[employer] UV ASPHERIC BICONVEX IOL [id number] [employer]  Right 1 Implanted

## 2023-12-04 NOTE — DISCHARGE INSTRUCTIONS
[facility]  Home Care Following Anesthesia  For 24 hours after surgery:  Get plenty of rest.  A responsible adult must stay with you for at least 24 hours after you leave the surgery center.  Do not drive or use heavy equipment.  If you have weakness or tingling, don't drive or use heavy equipment until this feeling goes away.   Do not drink alcohol.   Avoid strenuous or risky activities.  Ask for help when climbing stairs.  You may feel lightheaded.  IF so, sit for a few minutes before standing.  Have someone help you get up.   If you have nausea (feel sick to your stomach): Drink only clear liquids such as apple juice, ginger ale, broth or 7-Up.  Rest may also help.  Be sure to drink enough fluids.  Move to a regular diet as you feel able.   You may have a slight fever.  Call the doctor if your fever is over 100 F (37.7 C) (taken under the tongue) or lasts longer than 24 hours.  You may have a dry mouth, a sore throat, muscle aches or trouble sleeping. These should go away after 24 hours.  Do not make important or legal decisions.   It is recommended to avoid smoking.               Tips for taking pain medications  To get the best pain relief possible, remember these points:  Take pain medications as directed, before pain becomes severe.  Pain medication can upset your stomach: taking it with food may help.  Constipation is a common side effect of pain medication. Drink plenty of  fluids.  Eat foods high in fiber. Take a stool softener if recommended by your doctor or pharmacist.  Do not drink alcohol, drive or operate machinery while taking pain medications.  Ask about other ways to control pain, such as with heat, ice or relaxation.    Tylenol/Acetaminophen Consumption    If you feel your pain relief is insufficient, you may take Tylenol/Acetaminophen in addition to your narcotic pain medication.   Be careful not to exceed 4,000 mg of Tylenol/Acetaminophen in a 24 hour  period from all sources.  If you are taking extra strength Tylenol/acetaminophen (500 mg), the maximum dose is 8 tablets in 24 hours.  If you are taking regular strength acetaminophen (325 mg), the maximum dose is 12 tablets in 24 hours.    Call a doctor for any of the following:  Signs of infection (fever, growing tenderness at the surgery site, a large amount of drainage or bleeding, severe pain, foul-smelling drainage, redness, swelling).  It has been over 8 to 10 hours since surgery and you are still not able to urinate (pass water).  Headache for over 24 hours.  Numbness, tingling or weakness the day after surgery (if you had spinal anesthesia).  Signs of Covid-19 infection (temperature over 100 degrees, shortness of breath, cough, loss of taste/smell, generalized body aches, persistent headache, chills, sore throat, nausea/vomiting/diarrhea)  Your doctor is:       Dr. Ankit Alcaraz, Ophthalmology: [phone]               Or dial [phone] and ask for the resident on call for:  Ophthalmology  For emergency care, call the:  [facility]:  [phone] (TTY for hearing impaired: [phone])

## 2023-12-04 NOTE — ANESTHESIA PREPROCEDURE EVALUATION
Anesthesia Pre-Procedure Evaluation    Patient: Nicky Hernández   MRN: [id number] : 1940        Procedure : Procedure(s):  RIGHT EYE PHACOEMULSIFICATION, CATARACT, WITH INTRAOCULAR LENS IMPLANT          Past Medical History:   Diagnosis Date    Abdominal aortic aneurysm (AAA) without rupture (H24) 10/12/2019    Hyperlipidemia LDL goal <100 10/12/2019    Hypertension, unspecified type 10/12/2019    RICHIE (obstructive sleep apnea) 10/12/2019      Past Surgical History:   Procedure Laterality Date    ANTERIOR VITRECTOMY UNPLANNED Left 2023    Procedure: ANTERIOR VITRECTOMY UNPLANNED;  Surgeon: Ankit Fitzpatrick MD;  Location: [facility] [place] OR    COLONOSCOPY  2017    PHACOEMULSIFICATION CLEAR CORNEA WITH STANDARD INTRAOCULAR LENS IMPLANT Left 2023    Procedure: LEFT EYE COMPLEX PHACOEMULSIFICATION, CATARACT, WITH INTRAOCULAR LENS IMPLANT;  Surgeon: Ankit Fitzpatrick MD;  Location: [facility] [place] OR      No Known Allergies   Social History     Tobacco Use    Smoking status: Former     Types: Pipe    Smokeless tobacco: Never    Tobacco comments:     pipe mainly stopped 5.5 yrs ago   Substance Use Topics    Alcohol use: Yes     Comment: occasional.      Wt Readings from Last 1 Encounters:   23 78.9 kg (174 lb)        Anesthesia Evaluation   Pt has had prior anesthetic. Type: General and MAC.    No history of anesthetic complications       ROS/MED HX  ENT/Pulmonary:     (+) sleep apnea,                                      Neurologic:  - neg neurologic ROS     Cardiovascular:     (+)  hypertension- -   -  - -                                   (-) CAD   METS/Exercise Tolerance: 4 - Raking leaves, gardening    Hematologic:  - neg hematologic  ROS     Musculoskeletal:  - neg musculoskeletal ROS     GI/Hepatic:  - neg GI/hepatic ROS     Renal/Genitourinary:  - neg Renal ROS     Endo:  - neg endo ROS     Psychiatric/Substance Use:  - neg psychiatric ROS     Infectious Disease:  - neg infectious disease ROS    "  Malignancy:  - neg malignancy ROS     Other:  - neg other ROS          Physical Exam    Airway  airway exam normal           Respiratory Devices and Support         Dental       (+) Removable bridges or other hardware      Cardiovascular   cardiovascular exam normal       Rhythm and rate: regular and normal     Pulmonary                   OUTSIDE LABS:  CBC:   Lab Results   Component Value Date    WBC 7.6 11/22/2023    WBC 7.1 11/21/2022    HGB 13.8 11/22/2023    HGB 13.7 11/21/2022    HCT 43.1 11/22/2023    HCT 41.0 11/21/2022     11/22/2023     11/21/2022     BMP:   Lab Results   Component Value Date     11/22/2023     11/21/2022    POTASSIUM 4.1 11/22/2023    POTASSIUM 4.1 11/21/2022    CHLORIDE 101 11/22/2023    CHLORIDE 103 11/21/2022    CO2 26 11/22/2023    CO2 21 (L) 11/21/2022    BUN 15.0 11/22/2023    BUN 11.9 11/21/2022    CR 1.03 11/22/2023    CR 0.84 11/21/2022     (H) 11/22/2023     (H) 11/21/2022     COAGS: No results found for: \"PTT\", \"INR\", \"FIBR\"  POC: No results found for: \"BGM\", \"HCG\", \"HCGS\"  HEPATIC:   Lab Results   Component Value Date    ALBUMIN 4.4 11/22/2023    PROTTOTAL 7.3 11/22/2023    ALT 22 11/22/2023    AST 28 11/22/2023    ALKPHOS 93 11/22/2023    BILITOTAL 0.4 11/22/2023     OTHER:   Lab Results   Component Value Date    A1C 5.8 (H) 11/22/2023    JOSE FRANCISCO 10.0 11/22/2023       Anesthesia Plan    ASA Status:  2       Anesthesia Type: MAC.   Induction: Intravenous.   Maintenance: TIVA.        Consents            Postoperative Care    Pain management: IV analgesics.   PONV prophylaxis: Ondansetron (or other 5HT-3)     Comments:               David Ndiaye MD    I have reviewed the pertinent notes and labs in the chart from the past 30 days and (re)examined the patient.  Any updates or changes from those notes are reflected in this note.             # Drug Induced Platelet Defect: home medication list includes an antiplatelet medication  # " "Overweight: Estimated body mass index is 28.08 kg/m  as calculated from the following:    Height as of this encounter: 1.676 m (5' 6\").    Weight as of this encounter: 78.9 kg (174 lb).      "

## 2023-12-04 NOTE — ANESTHESIA POSTPROCEDURE EVALUATION
3/7/2019 2:09 PM Returned call to Luly Catherine and left a message on her voice mail to call the office    Michael Sanders DO Patient: Nicky Hernández    Procedure: Procedure(s):  RIGHT EYE PHACOEMULSIFICATION, COMPLEX CATARACT, WITH INTRAOCULAR LENS IMPLANT       Anesthesia Type:  MAC    Note:  Disposition: Outpatient   Postop Pain Control: Uneventful            Sign Out: Well controlled pain   PONV: No   Neuro/Psych: Uneventful            Sign Out: Acceptable/Baseline neuro status   Airway/Respiratory: Uneventful            Sign Out: Acceptable/Baseline resp. status   CV/Hemodynamics: Uneventful            Sign Out: Acceptable CV status; No obvious hypovolemia; No obvious fluid overload   Other NRE: NONE   DID A NON-ROUTINE EVENT OCCUR? No           Last vitals:  Vitals Value Taken Time   /79 12/04/23 1025   Temp 36.2  C (97.1  F) 12/04/23 1025   Pulse 59 12/04/23 1025   Resp 16 12/04/23 1025   SpO2 96 % 12/04/23 1025       Electronically Signed By: David Ndiaye MD  December 4, 2023  2:13 PM

## 2023-12-04 NOTE — OP NOTE
SURGEON: Ankit Hernandez MD   ASSISTANT SURGEON: Sherie Vlaencia MD    PREOPERATIVE DIAGNOSIS: Visually significant cataract, right eye   POSTOPERATIVE DIAGNOSIS: same + miotic pupil right eye   PROCEDURE: complex cataract surgery with phacoemulsification with intraocular lens implantation of the right eye     ANESTHESIA: Monitored anesthesia care and topical anesthesia   COMPLICATIONS: none    Indication: Nicky Hernández is a 83 year old patient with diagnosis of visually significant cataract right eye, here for cataract surgery    DESCRIPTION OF THE PROCEDURE:  The patient was taken to the operative room where monitored anesthetic care and topical anesthesia were given     The operative eye was prepped and draped in the usual sterile surgical fashion for ophthalmic surgery, including the installation of one drop of 5% Povidone Iodine.  A sterile drape was placed over the face and body and a lid speculum was inserted.      With the use of a Supersharp blade , a paracentesis was created at the limbus. Pupil was very small and there was not a good red reflex. The patient had posterior capsule opening and anterior vitrectomy for the other eye last week due to inadequate pupil dilation. Right eye pupil was also miotic and did not dilated with pre op drops and also, there was inadequate visualization of the anterior capsule due to the severity of cataract. So it was decided to stain the capsule and mechanically open the pupil. Trypan blue was injected into the AC. Lidocaine 2% solution was placed in the anterior chamber. With the 30 g needle, 4 paracentesis were placed in the superonasal, inferonasal, superotemporal and inferotemporal near the limbus. Viscoelastic was injected into the anterior chamber using a canula. Iris hooks were placed in all 4 quadrants due to poor pupil dilation. A 2.4 mm keratome was then used to construct a clear corneal incision at the 9 o'clock position.  Using Utrata forceps and cystotome  needle, a continuous curvilinear capsulorrhexis was created and hydrodissection was undertaken with the use of BSS.  The nucleus was found to be freely mobile and then removed by phacoemulsification using stop and chop technique. The remaining elements of cortex were then removed with irrigation/aspiration.  An IOL,was injected into the capsular bag and was rotated into a good position with a Sinskey hook. The remaining elements of viscoelastic were then removed with irrigation/aspiration. Iris hooks were removed. Iris hooks were removed. The wounds were hydrated and were watertight.  Intracameral moxifloxacin was injected.     The lid speculum was removeThe eye was cleaned with wet and dry gauze. A clear shield were placed over the eye.  The patient was discharge in stable condition having tolerated the procedure well.    Implant Name Type Inv. Item Serial No.  Lot No. LRB No. Used Action   LENS [id number] 24.0 [employer] UV ASPHERIC BICONVEX IOL - [id number] Lens/Eye Implant LENS [id number] 24.0 [employer] UV ASPHERIC BICONVEX IOL [id number] [employer]  Right 1 Implanted

## 2023-12-04 NOTE — ANESTHESIA CARE TRANSFER NOTE
Patient: Nicky Hernández    Procedure: Procedure(s):  RIGHT EYE PHACOEMULSIFICATION, COMPLEX CATARACT, WITH INTRAOCULAR LENS IMPLANT       Diagnosis: Nuclear senile cataract of both eyes [H25.13]  Diagnosis Additional Information: No value filed.    Anesthesia Type:   MAC     Note:    Oropharynx: spontaneously breathing  Level of Consciousness: awake  Oxygen Supplementation: room air    Independent Airway: airway patency satisfactory and stable  Dentition: dentition unchanged  Vital Signs Stable: post-procedure vital signs reviewed and stable  Report to RN Given: handoff report given  Patient transferred to: Phase II    Handoff Report: Identifed the Patient, Identified the Reponsible Provider, Reviewed the pertinent medical history, Discussed the surgical course, Reviewed Intra-OP anesthesia mangement and issues during anesthesia, Set expectations for post-procedure period and Allowed opportunity for questions and acknowledgement of understanding      Vitals:  Vitals Value Taken Time   /84    Temp 96.8    Pulse 60    Resp     SpO2 96        Electronically Signed By: NADJA Mayorga CRNA  December 4, 2023  10:17 AM

## 2023-12-05 ENCOUNTER — OFFICE VISIT (OUTPATIENT)
Dept: OPHTHALMOLOGY | Facility: CLINIC | Age: 83
End: 2023-12-05
Attending: OPHTHALMOLOGY
Payer: MEDICARE

## 2023-12-05 DIAGNOSIS — Z48.810 AFTERCARE FOLLOWING SURGERY OF A SENSE ORGAN: Primary | ICD-10-CM

## 2023-12-05 PROCEDURE — G0463 HOSPITAL OUTPT CLINIC VISIT: HCPCS | Performed by: OPHTHALMOLOGY

## 2023-12-05 PROCEDURE — 99024 POSTOP FOLLOW-UP VISIT: CPT | Performed by: OPHTHALMOLOGY

## 2023-12-05 ASSESSMENT — EXTERNAL EXAM - LEFT EYE: OS_EXAM: NORMAL

## 2023-12-05 ASSESSMENT — SLIT LAMP EXAM - LIDS
COMMENTS: NORMAL
COMMENTS: NORMAL

## 2023-12-05 ASSESSMENT — VISUAL ACUITY
OS_SC: 20/500
METHOD: SNELLEN - LINEAR
OS_PH_SC+: -2
OD_SC+: -1
OD_SC: 20/60
OS_PH_SC: 20/70
OD_PH_SC: 20/30

## 2023-12-05 ASSESSMENT — TONOMETRY
IOP_METHOD: TONOPEN
OS_IOP_MMHG: 06
OD_IOP_MMHG: 19

## 2023-12-05 NOTE — NURSING NOTE
Chief Complaints and History of Present Illnesses   Patient presents with    Post Op (Ophthalmology) Right Eye     Chief Complaint(s) and History of Present Illness(es)       Post Op (Ophthalmology) Right Eye              Laterality: right eye    Onset: 1 day ago              Comments    1 day s/p CE/IOL RE 12/04/2023-Pt. States that he is doing well. No pain BE. Unable to tell if vision has improved yet.   Trina Valladares COT 7:48 AM December 5, 2023

## 2023-12-05 NOTE — PROGRESS NOTES
S/P CE IOL (complex catarqct surgery with anti vitx) left eye 11/27/23  S/P CE IOL right eye 12/4/23      Occasional floaters left eye but retina looks good as far as can be seen with small pupil (will dilate next week)  IOP 06 left eye: stop brimonidine    Right eye looks great    Plan:  No heavy lifting   Shield at night  Retina detachment and endophthalmitis precautions were discussed with the patient and was asked to return if any of the those occur    Medications to operative eye    -Prednisolone (white or pink top) 4/day right eye   -Ofloxacin (tan top) 4/day right eye   -Ketorolac (gray top) 4/day right eye      -Continue Prednisolone (white or pink top) 4/day left eye   -Stop Ofloxacin (tan top) right eye after 2 days left eye   -Stop Brimonidine (purple top) 2/day left eye     RTC next week with DFE and optos ou      Complete documentation of historical and exam elements from today's encounter can be found in the full encounter summary report (not reduplicated in this progress note). I personally obtained the chief complaint(s) and history of present illness.  I confirmed and edited as necessary the review of systems, past medical/surgical history, family history, social history, and examination findings as documented by others; and I examined the patient myself. I personally reviewed the relevant tests, images, and reports as documented above. I formulated and edited as necessary the assessment and plan and discussed the findings and management plan with the patient and family.    Ankit Hernandez MD  , Vitreoretinal surgery   Department of Ophthalmology  [facility]

## 2023-12-05 NOTE — PATIENT INSTRUCTIONS
-Prednisolone (white or pink top) 4/day right eye   -Ofloxacin (tan top) 4/day right eye   -Ketorolac (gray top) 4/day right eye        -Continue Prednisolone (white or pink top) 4/day left eye   -Stop Ofloxacin (tan top) right eye after 2 days left eye   -Stop Brimonidine (purple top) 2/day left eye

## 2023-12-12 ENCOUNTER — OFFICE VISIT (OUTPATIENT)
Dept: OPHTHALMOLOGY | Facility: CLINIC | Age: 83
End: 2023-12-12
Attending: OPHTHALMOLOGY
Payer: MEDICARE

## 2023-12-12 DIAGNOSIS — H35.89 MACULAR ATROPHY, RETINAL: ICD-10-CM

## 2023-12-12 DIAGNOSIS — Z48.810 AFTERCARE FOLLOWING SURGERY OF A SENSE ORGAN: Primary | ICD-10-CM

## 2023-12-12 PROCEDURE — 99024 POSTOP FOLLOW-UP VISIT: CPT | Performed by: OPHTHALMOLOGY

## 2023-12-12 PROCEDURE — 92250 FUNDUS PHOTOGRAPHY W/I&R: CPT | Performed by: OPHTHALMOLOGY

## 2023-12-12 PROCEDURE — G0463 HOSPITAL OUTPT CLINIC VISIT: HCPCS | Performed by: OPHTHALMOLOGY

## 2023-12-12 ASSESSMENT — REFRACTION_MANIFEST
OS_CYLINDER: +1.00
OS_SPHERE: +0.25
OS_AXIS: 167
OD_CYLINDER: +1.75
OD_AXIS: 001
OD_SPHERE: -0.75

## 2023-12-12 ASSESSMENT — VISUAL ACUITY
OS_PH_SC+: -1
OD_SC: 20/40
OD_PH_SC+: -2
METHOD: SNELLEN - LINEAR
OS_SC+: -2
OD_PH_SC: 20/30
OS_PH_SC: 20/30
OS_SC: 20/40

## 2023-12-12 ASSESSMENT — REFRACTION_WEARINGRX
OD_CYLINDER: +2.75
OD_SPHERE: -0.50
OD_CYLINDER: +2.75
OD_AXIS: 165
OD_SPHERE: +2.00
OS_AXIS: 006
OS_AXIS: 006
OS_SPHERE: +1.50
OS_CYLINDER: +2.25
OS_CYLINDER: +2.25
OS_SPHERE: -1.00
OD_AXIS: 165

## 2023-12-12 ASSESSMENT — EXTERNAL EXAM - LEFT EYE: OS_EXAM: NORMAL

## 2023-12-12 ASSESSMENT — TONOMETRY
IOP_METHOD: TONOPEN
OD_IOP_MMHG: 17
OS_IOP_MMHG: 15

## 2023-12-12 ASSESSMENT — SLIT LAMP EXAM - LIDS
COMMENTS: NORMAL
COMMENTS: NORMAL

## 2023-12-12 NOTE — PROGRESS NOTES
S/P CE IOL (complex cataract surgery with anti vitx) left eye 11/27/23  S/P CE IOL right eye 12/4/23      Occasional floaters left eye but retina looks good as far as can be seen with small pupil (will dilate next week)  IOP 17/15 OU    Right eye looks great    Plan:  No heavy lifting   Shield at night  Retina detachment and endophthalmitis precautions were discussed with the patient and was asked to return if any of the those occur    Medications to operative eye    Both eyes:  Taper prednisolone 3 times a day for 5 days, then two times a day for 5 days then once a day for 5 days then stop     Right eye:  Taper Ketorolac 3 times a day for 5 days, then two times a day for 5 days then once a day for 5 days then stop       RTC 2-3 months for DFE ou and OCT and MRx      Complete documentation of historical and exam elements from today's encounter can be found in the full encounter summary report (not reduplicated in this progress note). I personally obtained the chief complaint(s) and history of present illness.  I confirmed and edited as necessary the review of systems, past medical/surgical history, family history, social history, and examination findings as documented by others; and I examined the patient myself. I personally reviewed the relevant tests, images, and reports as documented above. I formulated and edited as necessary the assessment and plan and discussed the findings and management plan with the patient and family.    Ankit Hernandez MD  , Vitreoretinal surgery   Department of Ophthalmology  [facility]

## 2023-12-12 NOTE — NURSING NOTE
Chief Complaints and History of Present Illnesses   Patient presents with    Post Op (Ophthalmology) Right Eye     1 week post cataract surgery right eye 12/4/23  and left eye 11/27/23     Chief Complaint(s) and History of Present Illness(es)       Post Op (Ophthalmology) Right Eye              Comments: 1 week post cataract surgery right eye 12/4/23  and left eye 11/27/23              Comments    Patient has seen improvement in vision right eye>left eye. Patient states seeing floaters daily left eye. Patient denies flashes of light or eye pain.     Patient has bee using Prednisolone 4x a day both eyes  and Ketorolac 4x a day right eye.    Dana Fonseca COT 9:09 AM December 12, 2023

## 2023-12-27 ENCOUNTER — TELEPHONE (OUTPATIENT)
Dept: OPHTHALMOLOGY | Facility: CLINIC | Age: 83
End: 2023-12-27
Payer: MEDICARE

## 2023-12-27 NOTE — TELEPHONE ENCOUNTER
Appointment canceled for Nicky Hernández ([id number])  Visit Type: POST OP  Date        Time      Length    Provider                  Department  1/2/2024    10:00 AM  15 mins.  Ankit Alcaraz  [facility] EYE GENERAL     Reason for Cancellation: Other     Patient Comments: [place] be out of [place] at the date, need rescheduling by mid February 2024. Last week I left a message by phone for rescheduling.    --    Mychart appt request above received by triage today.    Pt to follow up in about 2-3 months per last note by Dr. Hernandez 12-    Clinic to reach out for scheduling    Carl Berumen, RN 8:52 AM 12/27/23

## 2023-12-27 NOTE — TELEPHONE ENCOUNTER
[phone] mobile number-- left message with direct number at [phone]    Carl Berumen RN 9:55 AM 12/27/23

## 2023-12-27 NOTE — TELEPHONE ENCOUNTER
Request for mid February appt    Scheduled 2- at 1:45 PM with Dr. Hernandez    Left detailed message regarding tentative scheduling and direct number for any further assistance/rescheduling.    Carl Berumen RN 4:42 PM 12/27/23

## 2023-12-28 ENCOUNTER — MYC REFILL (OUTPATIENT)
Dept: FAMILY MEDICINE | Facility: CLINIC | Age: 83
End: 2023-12-28
Payer: MEDICARE

## 2023-12-28 DIAGNOSIS — I10 PRIMARY HYPERTENSION: ICD-10-CM

## 2023-12-28 DIAGNOSIS — I25.10 CORONARY ARTERY CALCIFICATION SEEN ON CT SCAN: ICD-10-CM

## 2023-12-28 DIAGNOSIS — E78.5 HYPERLIPIDEMIA LDL GOAL <100: ICD-10-CM

## 2023-12-28 RX ORDER — LOSARTAN POTASSIUM 50 MG/1
50 TABLET ORAL DAILY
Qty: 90 TABLET | Refills: 3 | Status: SHIPPED | OUTPATIENT
Start: 2023-12-28 | End: 2024-05-10

## 2023-12-28 RX ORDER — ATORVASTATIN CALCIUM 20 MG/1
20 TABLET, FILM COATED ORAL DAILY
Qty: 90 TABLET | Refills: 2 | Status: SHIPPED | OUTPATIENT
Start: 2023-12-28

## 2023-12-28 NOTE — TELEPHONE ENCOUNTER
Prescription approved per [facility] Refill Protocol.  Taryn Morris, RN  [facility] Triage Nurse

## 2024-01-31 DIAGNOSIS — H35.89 MACULAR ATROPHY, RETINAL: Primary | ICD-10-CM

## 2024-02-11 ENCOUNTER — MYC MEDICAL ADVICE (OUTPATIENT)
Dept: FAMILY MEDICINE | Facility: CLINIC | Age: 84
End: 2024-02-11
Payer: MEDICARE

## 2024-02-11 DIAGNOSIS — N13.9 LOWER URINARY OBSTRUCTIVE SYMPTOM: ICD-10-CM

## 2024-02-12 RX ORDER — TAMSULOSIN HYDROCHLORIDE 0.4 MG/1
0.4 CAPSULE ORAL EVERY OTHER DAY
Qty: 45 CAPSULE | Refills: 1 | Status: SHIPPED | OUTPATIENT
Start: 2024-02-12 | End: 2024-03-25

## 2024-02-14 ENCOUNTER — OFFICE VISIT (OUTPATIENT)
Dept: OPHTHALMOLOGY | Facility: CLINIC | Age: 84
End: 2024-02-14
Attending: OPHTHALMOLOGY
Payer: MEDICARE

## 2024-02-14 DIAGNOSIS — H35.89 MACULAR ATROPHY, RETINAL: ICD-10-CM

## 2024-02-14 PROCEDURE — 99024 POSTOP FOLLOW-UP VISIT: CPT | Performed by: OPHTHALMOLOGY

## 2024-02-14 PROCEDURE — 92134 CPTRZ OPH DX IMG PST SGM RTA: CPT | Performed by: OPHTHALMOLOGY

## 2024-02-14 PROCEDURE — G0463 HOSPITAL OUTPT CLINIC VISIT: HCPCS | Performed by: OPHTHALMOLOGY

## 2024-02-14 ASSESSMENT — TONOMETRY
OD_IOP_MMHG: 18
OS_IOP_MMHG: 15
IOP_METHOD: TONOPEN

## 2024-02-14 ASSESSMENT — EXTERNAL EXAM - LEFT EYE: OS_EXAM: NORMAL

## 2024-02-14 ASSESSMENT — VISUAL ACUITY
OS_SC+: -1
OD_PH_SC: 20/25
OD_SC+: -
OS_SC: 20/30
OS_PH_SC: 20/25
METHOD: SNELLEN - LINEAR
OD_PH_SC+: -1
OS_PH_SC+: -1
OD_SC: 20/40

## 2024-02-14 ASSESSMENT — SLIT LAMP EXAM - LIDS
COMMENTS: NORMAL
COMMENTS: NORMAL

## 2024-02-14 NOTE — PROGRESS NOTES
CC: S/P CE IOL OU  Interval: very happy with vision ; no complaint  HPI -   Nicky Hernández is a 82 year old male here for evaluation. He was referred by Dr. Joy for cataract and macular degeneration.     PAST MEDICAL HISTORY:  RICHIE (uses CPAP)  Interstitial lung disease  HTN    PAST OCULAR SURGERY:  2017 Right Laser retinopexy ([facility])  S/P CE IOL (complex cataract surgery with anti vitx) left eye 11/27/23  S/P CE IOL right eye 12/4/23      PAST OCULAR HISTORY:  No LASIK  Mother with glaucoma (treated w/ drops)      RETINAL IMAGING:    OCT Macula 02/14/2024  OD: foveal contour present, no fluid, pattern-type elevations of RPE with focal area of cRORA temporal to fovea, choroid equal thickness to retina, hyaloid not visualized  OS: foveal contour present, no fluid,  focal area of cRORA nasal to fovea with some extension superior to fovea, adjacent drusen, choroid equal thickness to retina, hyaloid not visualized    Optos 04/26/23 and 12/2023  OD: macula with mild pigment abnormalities, area of macular atrophy T to fovea, disc normal, temporal area in the periphery with scar appears c/w laser barricade, no hyperAF of the FAF in this region, on FAF there is hyperAF of pattern-type bands in the macula  OS: relatively normal color and FAF, some nasal peripheral reticular pigment change, small area of macular atrophy in nasal macula, minimal hyperAF patterns in macula    ASSESSMENT & PLAN    # Pattern dystrophy, 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   - recommend AREDS2 vitamins   - Amsler grid given to patient w/ instruction 02/14/24    # Macular atrophy, both eyes   - off fovea, a/w pattern dystrophy   - monitor w/ FAF    # Pseudophakia OU   - CE IOL each eye 11/2023 and 12/2023   - happy with vision   - will do refraction in future    # Large cup:disc ratios OU   - some thinning outside ISNT rule   - FH of glaucoma   - on exam angles are narrow by VH OU   - recommend further eval w/ undilated gonioscopy    RTC in 12  months for DFE and OCT and FAF with spectralis ou and OCT RNFL ou     Complete documentation of historical and exam elements from today's encounter can be found in the full encounter summary report (not reduplicated in this progress note). I personally obtained the chief complaint(s) and history of present illness.  I confirmed and edited as necessary the review of systems, past medical/surgical history, family history, social history, and examination findings as documented by others; and I examined the patient myself. I personally reviewed the relevant tests, images, and reports as documented above. I formulated and edited as necessary the assessment and plan and discussed the findings and management plan with the patient and family.    Ankit Hernandez MD  , Vitreoretinal surgery   Department of Ophthalmology  [facility]

## 2024-02-14 NOTE — NURSING NOTE
Chief Complaints and History of Present Illnesses   Patient presents with    Post Op (Ophthalmology) Both Eyes     Chief Complaint(s) and History of Present Illness(es)       Post Op (Ophthalmology) Both Eyes               Comments    No problems or concerns   No eye pain     Zulay Hurley COT 2:03 PM February 14, 2024

## 2024-02-25 ENCOUNTER — HEALTH MAINTENANCE LETTER (OUTPATIENT)
Age: 84
End: 2024-02-25

## 2024-03-22 ENCOUNTER — MYC MEDICAL ADVICE (OUTPATIENT)
Dept: FAMILY MEDICINE | Facility: CLINIC | Age: 84
End: 2024-03-22
Payer: MEDICARE

## 2024-03-22 DIAGNOSIS — N13.9 LOWER URINARY OBSTRUCTIVE SYMPTOM: ICD-10-CM

## 2024-03-25 RX ORDER — TAMSULOSIN HYDROCHLORIDE 0.4 MG/1
0.4 CAPSULE ORAL EVERY OTHER DAY
Qty: 45 CAPSULE | Refills: 1 | Status: SHIPPED | OUTPATIENT
Start: 2024-03-25

## 2024-05-10 ENCOUNTER — OFFICE VISIT (OUTPATIENT)
Dept: CARDIOLOGY | Facility: CLINIC | Age: 84
End: 2024-05-10
Payer: MEDICARE

## 2024-05-10 VITALS
HEIGHT: 65 IN | WEIGHT: 177.2 LBS | BODY MASS INDEX: 29.52 KG/M2 | OXYGEN SATURATION: 98 % | HEART RATE: 55 BPM | DIASTOLIC BLOOD PRESSURE: 81 MMHG | SYSTOLIC BLOOD PRESSURE: 162 MMHG

## 2024-05-10 DIAGNOSIS — I77.810 ASCENDING AORTA DILATATION (H): ICD-10-CM

## 2024-05-10 DIAGNOSIS — I10 PRIMARY HYPERTENSION: ICD-10-CM

## 2024-05-10 PROCEDURE — 99214 OFFICE O/P EST MOD 30 MIN: CPT | Performed by: INTERNAL MEDICINE

## 2024-05-10 RX ORDER — LOSARTAN POTASSIUM 100 MG/1
100 TABLET ORAL DAILY
Qty: 90 TABLET | Refills: 3 | Status: SHIPPED | OUTPATIENT
Start: 2024-05-10

## 2024-05-10 NOTE — LETTER
"5/10/2024    Tom Oneal MD  [address]  [facility] [address]    RE: Nicky Hernández       Dear Colleague,     I had the pleasure of seeing Nicky Hernández in the [facility].  CARDIOLOGY CLINIC FOLLOW-UP VISIT      REASON FOR VISIT:   Follow-up ascending aortic dilatation    PRIMARY CARE PHYSICIAN:  Tom Oneal        History of Present Illness  Nicky Hernández is an extremely pleasant 83 year old male with a history of hypertension, hyperlipidemia, and ascending aortic dilatation, here for routine follow-up.  His FH is notable for his nephew unfortunately dying of a ruptured TAA in early 2023.      Since his last visit with me in 6/2023, he reports that he continues to do very well with no cardiac complaints.  His spouse does note that he \"does not like\" to go up stairs or walk up inclines, but they both confirm that this has not dramatically changed recently.  No chest pains, shortness of breath, lower extremity swelling, palpitations, lightheadedness, or any other complaints.      His blood pressure is elevated today at 162/81.  He tells me at home it is usually better, but more in the mid 130s/low 140s.   His last labs were from 11/22/2023, showing total cholesterol 133, HDL 44, LDL 65, triglycerides 119, normal sodium and potassium, normal renal function, A1c of 5.8%, and normal hemoglobin and platelets.  His initial TTE from 11/2020 showed a thoracic aorta measuring 4.3 cm.  This was followed with a CT of the chest measuring a maximal diameter of 3.7 cm.  Repeat TTE from 4/6/2022 showed a maximal diameter of 4.2 cm, a mildly dilated RV, and no other significant abnormalities, and his most recent TTE from 6/29/2023 showed a maximal aortic diameter of 3.6 cm (though imaging quality was poor).      Assessment & Plan    Ascending aortic dilatation (4.2 cm on 4/2022 TTE; 3.6 cm on poor quality TTE from 6/2023; TTE has historically measured approximately 0.6 cm greater than " corresponding CTA)  Lower threshold of 4.5 cm to consider surgical intervention due to FH of ruptured TAA in nephew in early 2023  Will need to confirm any TTE measurements suggesting need for surgery with corresponding CTA  Hypertension, with whitecoat component, better controlled at home but still above goal  Hyperlipidemia, well controlled  Former tobacco abuse      It was a pleasure to speak with Nicky and his wife again in clinic today.  Overall he is doing well from a cardiac standpoint, and the only change I make at present would be to increase his losartan from 50 mg daily to 100 mg daily.  Will get a repeat chemistry panel in about 1 week.  I also asked him to please send us a home blood pressure log with 1 weeks worth of data on this.  Ideally we would like to see his blood pressures at least under 125/85 on average.        -Increase losartan from 50 mg daily to 100 mg daily  -Repeat BMP in 1 week  -Home BP log x 1 week, may adjust further if needed  -Patient is aware of the importance of avoiding heavy lifting, etc. requiring the Valsalva maneuver which could increase intrathoracic pressure.  -Continue Lipitor 20 mg daily  -Plan on next TTE in 6/2025      Follow-up: 1 year, with labs and TTE beforehand, or sooner as needed        Justo Connolly MD  Interventional Cardiology  May 10, 2024        Medications  Current Outpatient Medications   Medication Sig Dispense Refill    aspirin (ASA) 81 MG tablet Take 81 mg by mouth daily      atorvastatin (LIPITOR) 20 MG tablet Take 1 tablet (20 mg) by mouth daily 90 tablet 2    B Complex-Folic Acid (B COMPLEX-VITAMIN B12 PO) Take 1,000 mg by mouth daily      losartan (COZAAR) 50 MG tablet Take 1 tablet (50 mg) by mouth daily 90 tablet 3    tamsulosin (FLOMAX) 0.4 MG capsule Take 1 capsule (0.4 mg) by mouth every other day Due for appointment. Please schedule: [phone] 45 capsule 1    Vitamin D3 (CHOLECALCIFEROL) 125 MCG (5000 UT) tablet Take by mouth daily       brimonidine (ALPHAGAN) 0.2 % ophthalmic solution Place 1 drop Into the left eye 2 times daily (Patient not taking: Reported on 5/10/2024) 5 mL 0    ketorolac (ACULAR) 0.5 % ophthalmic solution Place 1 drop into the right eye 4 times daily (Patient not taking: Reported on 5/10/2024) 5 mL 0    ofloxacin (OCUFLOX) 0.3 % ophthalmic solution Place 1 drop Into the left eye 4 times daily (Patient not taking: Reported on 5/10/2024) 5 mL 0    prednisoLONE acetate (PRED FORTE) 1 % ophthalmic suspension Place 1 drop into the right eye 4 times daily To operative eye (Patient not taking: Reported on 5/10/2024) 5 mL 0    prednisoLONE acetate (PRED FORTE) 1 % ophthalmic suspension Place 1 drop Into the left eye 4 times daily (Patient not taking: Reported on 5/10/2024) 5 mL 1     No current facility-administered medications for this visit.     Allergies  No Known Allergies      Physical Exam      BP: (!) 162/81 Pulse: 55     SpO2: 98 %      Vital Signs with Ranges  Pulse:  [55] 55  BP: (162)/(81) 162/81  SpO2:  [98 %] 98 %  177 lbs 3.2 oz    Constitutional: Well-appearing, no acute distress  Respiratory: Normal respiratory effort, CTAB  Cardiovascular: RRR, no m/r/g.  JVP < 7 cm H2O.  There is no LE edema.  Normal carotid upstrokes, no carotid bruits.      Thank you for allowing me to participate in the care of your patient.      Sincerely,     Justo Connolly MD      Heart Care  cc:   Justo Connolly MD  [address] [id number]

## 2024-05-10 NOTE — PROGRESS NOTES
"CARDIOLOGY CLINIC FOLLOW-UP VISIT      REASON FOR VISIT:   Follow-up ascending aortic dilatation    PRIMARY CARE PHYSICIAN:  Tom Oneal        History of Present Illness   Nicky Hernández is an extremely pleasant 83 year old male with a history of hypertension, hyperlipidemia, and ascending aortic dilatation, here for routine follow-up.  His FH is notable for his nephew unfortunately dying of a ruptured TAA in early 2023.      Since his last visit with me in 6/2023, he reports that he continues to do very well with no cardiac complaints.  His spouse does note that he \"does not like\" to go up stairs or walk up inclines, but they both confirm that this has not dramatically changed recently.  No chest pains, shortness of breath, lower extremity swelling, palpitations, lightheadedness, or any other complaints.      His blood pressure is elevated today at 162/81.  He tells me at home it is usually better, but more in the mid 130s/low 140s.   His last labs were from 11/22/2023, showing total cholesterol 133, HDL 44, LDL 65, triglycerides 119, normal sodium and potassium, normal renal function, A1c of 5.8%, and normal hemoglobin and platelets.  His initial TTE from 11/2020 showed a thoracic aorta measuring 4.3 cm.  This was followed with a CT of the chest measuring a maximal diameter of 3.7 cm.  Repeat TTE from 4/6/2022 showed a maximal diameter of 4.2 cm, a mildly dilated RV, and no other significant abnormalities, and his most recent TTE from 6/29/2023 showed a maximal aortic diameter of 3.6 cm (though imaging quality was poor).      Assessment & Plan     Ascending aortic dilatation (4.2 cm on 4/2022 TTE; 3.6 cm on poor quality TTE from 6/2023; TTE has historically measured approximately 0.6 cm greater than corresponding CTA)  Lower threshold of 4.5 cm to consider surgical intervention due to FH of ruptured TAA in nephew in early 2023  Will need to confirm any TTE measurements suggesting need for surgery with " corresponding CTA  Hypertension, with whitecoat component, better controlled at home but still above goal  Hyperlipidemia, well controlled  Former tobacco abuse      It was a pleasure to speak with Nicky and his wife again in clinic today.  Overall he is doing well from a cardiac standpoint, and the only change I make at present would be to increase his losartan from 50 mg daily to 100 mg daily.  Will get a repeat chemistry panel in about 1 week.  I also asked him to please send us a home blood pressure log with 1 weeks worth of data on this.  Ideally we would like to see his blood pressures at least under 125/85 on average.        -Increase losartan from 50 mg daily to 100 mg daily  -Repeat BMP in 1 week  -Home BP log x 1 week, may adjust further if needed  -Patient is aware of the importance of avoiding heavy lifting, etc. requiring the Valsalva maneuver which could increase intrathoracic pressure.  -Continue Lipitor 20 mg daily  -Plan on next TTE in 6/2025      Follow-up: 1 year, with labs and TTE beforehand, or sooner as needed        Justo Connolly MD  Interventional Cardiology  May 10, 2024        Medications   Current Outpatient Medications   Medication Sig Dispense Refill    aspirin (ASA) 81 MG tablet Take 81 mg by mouth daily      atorvastatin (LIPITOR) 20 MG tablet Take 1 tablet (20 mg) by mouth daily 90 tablet 2    B Complex-Folic Acid (B COMPLEX-VITAMIN B12 PO) Take 1,000 mg by mouth daily      losartan (COZAAR) 50 MG tablet Take 1 tablet (50 mg) by mouth daily 90 tablet 3    tamsulosin (FLOMAX) 0.4 MG capsule Take 1 capsule (0.4 mg) by mouth every other day Due for appointment. Please schedule: [phone] 45 capsule 1    Vitamin D3 (CHOLECALCIFEROL) 125 MCG (5000 UT) tablet Take by mouth daily      brimonidine (ALPHAGAN) 0.2 % ophthalmic solution Place 1 drop Into the left eye 2 times daily (Patient not taking: Reported on 5/10/2024) 5 mL 0    ketorolac (ACULAR) 0.5 % ophthalmic solution Place 1  drop into the right eye 4 times daily (Patient not taking: Reported on 5/10/2024) 5 mL 0    ofloxacin (OCUFLOX) 0.3 % ophthalmic solution Place 1 drop Into the left eye 4 times daily (Patient not taking: Reported on 5/10/2024) 5 mL 0    prednisoLONE acetate (PRED FORTE) 1 % ophthalmic suspension Place 1 drop into the right eye 4 times daily To operative eye (Patient not taking: Reported on 5/10/2024) 5 mL 0    prednisoLONE acetate (PRED FORTE) 1 % ophthalmic suspension Place 1 drop Into the left eye 4 times daily (Patient not taking: Reported on 5/10/2024) 5 mL 1     No current facility-administered medications for this visit.     Allergies   No Known Allergies      Physical Exam       BP: (!) 162/81 Pulse: 55     SpO2: 98 %      Vital Signs with Ranges  Pulse:  [55] 55  BP: (162)/(81) 162/81  SpO2:  [98 %] 98 %  177 lbs 3.2 oz    Constitutional: Well-appearing, no acute distress  Respiratory: Normal respiratory effort, CTAB  Cardiovascular: RRR, no m/r/g.  JVP < 7 cm H2O.  There is no LE edema.  Normal carotid upstrokes, no carotid bruits.

## 2024-05-16 ENCOUNTER — LAB (OUTPATIENT)
Dept: LAB | Facility: CLINIC | Age: 84
End: 2024-05-16
Payer: MEDICARE

## 2024-05-16 DIAGNOSIS — I10 PRIMARY HYPERTENSION: ICD-10-CM

## 2024-05-16 PROCEDURE — 80048 BASIC METABOLIC PNL TOTAL CA: CPT

## 2024-05-16 PROCEDURE — 36415 COLL VENOUS BLD VENIPUNCTURE: CPT

## 2024-05-17 LAB
ANION GAP SERPL CALCULATED.3IONS-SCNC: 10 MMOL/L (ref 7–15)
BUN SERPL-MCNC: 12.2 MG/DL (ref 8–23)
CALCIUM SERPL-MCNC: 9.7 MG/DL (ref 8.8–10.2)
CHLORIDE SERPL-SCNC: 104 MMOL/L (ref 98–107)
CREAT SERPL-MCNC: 0.94 MG/DL (ref 0.67–1.17)
DEPRECATED HCO3 PLAS-SCNC: 26 MMOL/L (ref 22–29)
EGFRCR SERPLBLD CKD-EPI 2021: 81 ML/MIN/1.73M2
GLUCOSE SERPL-MCNC: 92 MG/DL (ref 70–99)
POTASSIUM SERPL-SCNC: 4.5 MMOL/L (ref 3.4–5.3)
SODIUM SERPL-SCNC: 140 MMOL/L (ref 135–145)

## 2024-09-19 NOTE — PROGRESS NOTES
CPAP Follow-Up Visit:    Date on this visit: 9/20/2024    Nicky Hernández has a follow-up visit today for management of previously diagnosed RICHIE. His medical history is significant for HTN, AAA, interstitial lung disease (2015 dx at [place]).      He had a sleep study in [facility] in 2005.    Previous Study Results: [employer] home test. The report was obtained but not an interpretation.  Date: 5/16/2005.  Weight not reported.  AHI: 43.5/hr (14.9/hr mixed apnea, 3.3/hr central apnea, 13.4/hr obstructive, 11.9/hr hypopnea). O2 maria de jesus 86%.     A sleep study was repeated to get him qualified for new CPAP equipment as his initial study was missing parts of the documentation.  PSG results 9/30/2020:  AHI was 48.5/hr (54% centrals), with desaturations down to 78%. He spent 3 minutes below 90% SpO2 and 2.4 minutes below 89% SpO2. RDI 61.3/hr.  REM RDI 53.3/hr.  Supine RDI 61.3/hr. He did not sleep laterally. Periodic Limb Movement Index 0/hour, 0/hr associated with arousals        RespirSocrative Dreamstation 2 (last obtained by insurance was 10/19/2020)    The compliance data shows that the patient used the CPAP for 29/30 nights, 96.7% of nights for >4 hours.  The 90th% pressure is 9.8 cm.  The average time in large leak is 1 min.  The average nightly usage is 8:01.  The average AHI is 4.3/hr.     [place] so he thinks the data was not received.  He asks if he can get replaceable filters more than monthly. They look dirty.  He sleeps mostly on his back, and briefly on his sides.     PSG scheduled 9/30/20    Do you use a CPAP Machine at home:    Overall, on a scale of 0-10 how would you rate your CPAP (0 poor, 10 great):      What type of mask do you use:   N30i  Is your mask comfortable:   yes  If not, why:    How often do you replace supplies:    Is your mask leaking:   no  If yes, where do you feel it:    How many night per week does the mask leak (0-7):      Do you notice snoring with  mask on:   no  Do you notice gasping arousals with mask on:    Are you having significant oral or nasal dryness:  sometimes, especially if he forgets to fill the water chamber  Are you using the humidifier: yes  Does the water chamber run out before the night is over:lasts 2 days  Do you get condensation in the mask or hose:no  Is the pressure setting comfortable:   yes  If not, why:      Typical bedtime:   10:30-11 PM  Sleep latency on PAP therapy:   15-20 min  Typical wake time:  7 AM  Wakes 0-2 times per night for 15-20 minutes, occasionally up to an hour if his mind starts thinking. Reason for waking: restroom  How many hours on average per night are you using PAP therapy:    How many hours are you sleeping per night:    Do you feel well rested in the morning:   yes    Naps: no, even if feeling tired because it disrupts his nighttime sleep.         Weight change since sleep study: 177 lbs      Past medical/surgical history, family history, social history, medications and allergies were reviewed.      Problem List:  Patient Active Problem List    Diagnosis Date Noted    Abdominal aortic aneurysm (AAA) without rupture (H24) 12/01/2023     Priority: Medium    Hyperlipidemia LDL goal <100 12/01/2023     Priority: Medium    Nuclear senile cataract of both eyes 11/01/2023     Priority: Medium    IFG (impaired fasting glucose) 11/23/2022     Priority: Medium    Hypertension, unspecified type 10/12/2019     Priority: Medium    Ascending aorta dilation (H24) 10/12/2019     Priority: Medium    Skin lesion 10/12/2019     Priority: Medium    RICHIE (obstructive sleep apnea) 10/12/2019     Priority: Medium    Coronary artery calcification seen on CT scan 10/12/2019     Priority: Medium    ILD (interstitial lung disease) (H) 10/12/2019     Priority: Medium    Exposure to asbestos 10/12/2019     Priority: Medium        Impression/Plan:    (G47.33) RICHIE (obstructive sleep apnea)  (primary encounter diagnosis)  Comment: Mr. Hernández is  using CPAP nightly and benefits from use.  It appears his apnea is controlled to within the target range on average.  His download suggest there may be some mouth leak, more when the pressure is elevated.  His current mask is an N30i.  I think he would prefer if it did not have a big strap at the top of his head.  Plan: Comprehensive DME        Continue auto CPAP 6-10 cm. A prescription was written for new supplies. We reviewed recommendations for cleaning and replacing supplies.  We looked online at some alternative masks including the p10 and n20.  He will schedule a mask fitting appointment with [facility] to look at alternative masks.       He will follow up with me in about 1 year(s).     32 minutes were spent on the date of the encounter doing chart review, history and exam, documentation and further activities as noted above.     Bennett Goltz, PA-C    CC: No ref. provider found

## 2024-09-20 ENCOUNTER — VIRTUAL VISIT (OUTPATIENT)
Dept: SLEEP MEDICINE | Facility: CLINIC | Age: 84
End: 2024-09-20
Payer: MEDICARE

## 2024-09-20 VITALS — BODY MASS INDEX: 28.45 KG/M2 | HEIGHT: 66 IN | WEIGHT: 177 LBS

## 2024-09-20 DIAGNOSIS — G47.33 OSA (OBSTRUCTIVE SLEEP APNEA): Primary | ICD-10-CM

## 2024-09-20 PROCEDURE — G2211 COMPLEX E/M VISIT ADD ON: HCPCS | Mod: 95 | Performed by: PHYSICIAN ASSISTANT

## 2024-09-20 PROCEDURE — 99214 OFFICE O/P EST MOD 30 MIN: CPT | Mod: 95 | Performed by: PHYSICIAN ASSISTANT

## 2024-09-20 ASSESSMENT — PAIN SCALES - GENERAL: PAINLEVEL: NO PAIN (0)

## 2024-09-20 NOTE — PROGRESS NOTES
Virtual Visit Details    Type of service:  Video Visit   Video Start Time: 1:55 PM  Video End Time:2:22 PM    Originating Location (pt. Location): Home    Distant Location (provider location):  Off-site  Platform used for Video Visit: Lenny

## 2024-09-20 NOTE — NURSING NOTE
Current patient location: [address]  [facility] [address]    Is the patient currently in the state of [place]? YES    Visit mode:VIDEO    If the visit is dropped, the patient can be reconnected by: VIDEO VISIT: Text to cell phone:   Telephone Information:   Mobile [phone]       Will anyone else be joining the visit? NO  (If patient encounters technical issues they should call [phone])    How would you like to obtain your AVS? MyChart    Are changes needed to the allergy or medication list? No    Are refills needed on medications prescribed by this physician? NO    Rooming Documentation:  Questionnaire(s) completed    Reason for visit: MELYSSA BAEZF

## 2024-10-20 DIAGNOSIS — E78.5 HYPERLIPIDEMIA LDL GOAL <100: ICD-10-CM

## 2024-10-20 DIAGNOSIS — I25.10 CORONARY ARTERY CALCIFICATION SEEN ON CT SCAN: ICD-10-CM

## 2024-10-21 ENCOUNTER — MYC REFILL (OUTPATIENT)
Dept: CARDIOLOGY | Facility: CLINIC | Age: 84
End: 2024-10-21
Payer: MEDICARE

## 2024-10-21 ENCOUNTER — MYC REFILL (OUTPATIENT)
Dept: FAMILY MEDICINE | Facility: CLINIC | Age: 84
End: 2024-10-21
Payer: MEDICARE

## 2024-10-21 DIAGNOSIS — I10 PRIMARY HYPERTENSION: ICD-10-CM

## 2024-10-21 DIAGNOSIS — N13.9 LOWER URINARY OBSTRUCTIVE SYMPTOM: ICD-10-CM

## 2024-10-21 RX ORDER — ATORVASTATIN CALCIUM 20 MG/1
20 TABLET, FILM COATED ORAL DAILY
Qty: 90 TABLET | Refills: 0 | Status: SHIPPED | OUTPATIENT
Start: 2024-10-21

## 2024-10-21 RX ORDER — LOSARTAN POTASSIUM 100 MG/1
100 TABLET ORAL DAILY
Qty: 90 TABLET | Refills: 3 | Status: CANCELLED | OUTPATIENT
Start: 2024-10-21

## 2024-10-21 NOTE — TELEPHONE ENCOUNTER
Prescription approved per [facility].  Deidra Miranda RN  [facility]

## 2024-10-22 RX ORDER — TAMSULOSIN HYDROCHLORIDE 0.4 MG/1
0.4 CAPSULE ORAL EVERY OTHER DAY
Qty: 45 CAPSULE | Refills: 1 | Status: SHIPPED | OUTPATIENT
Start: 2024-10-22

## 2024-11-27 SDOH — HEALTH STABILITY: PHYSICAL HEALTH: ON AVERAGE, HOW MANY MINUTES DO YOU ENGAGE IN EXERCISE AT THIS LEVEL?: 40 MIN

## 2024-11-27 SDOH — HEALTH STABILITY: PHYSICAL HEALTH: ON AVERAGE, HOW MANY DAYS PER WEEK DO YOU ENGAGE IN MODERATE TO STRENUOUS EXERCISE (LIKE A BRISK WALK)?: 3 DAYS

## 2024-11-27 ASSESSMENT — SOCIAL DETERMINANTS OF HEALTH (SDOH): HOW OFTEN DO YOU GET TOGETHER WITH FRIENDS OR RELATIVES?: MORE THAN THREE TIMES A WEEK

## 2024-12-02 ENCOUNTER — OFFICE VISIT (OUTPATIENT)
Dept: FAMILY MEDICINE | Facility: CLINIC | Age: 84
End: 2024-12-02
Payer: MEDICARE

## 2024-12-02 ENCOUNTER — TELEPHONE (OUTPATIENT)
Dept: FAMILY MEDICINE | Facility: CLINIC | Age: 84
End: 2024-12-02

## 2024-12-02 VITALS
DIASTOLIC BLOOD PRESSURE: 73 MMHG | OXYGEN SATURATION: 97 % | HEART RATE: 64 BPM | WEIGHT: 184 LBS | BODY MASS INDEX: 29.57 KG/M2 | HEIGHT: 66 IN | RESPIRATION RATE: 16 BRPM | TEMPERATURE: 97.6 F | SYSTOLIC BLOOD PRESSURE: 139 MMHG

## 2024-12-02 DIAGNOSIS — I10 PRIMARY HYPERTENSION: ICD-10-CM

## 2024-12-02 DIAGNOSIS — M89.9 DISORDER OF BONE, UNSPECIFIED: ICD-10-CM

## 2024-12-02 DIAGNOSIS — J84.9 ILD (INTERSTITIAL LUNG DISEASE) (H): ICD-10-CM

## 2024-12-02 DIAGNOSIS — Z13.21 ENCOUNTER FOR VITAMIN DEFICIENCY SCREENING: ICD-10-CM

## 2024-12-02 DIAGNOSIS — E78.5 HYPERLIPIDEMIA LDL GOAL <100: ICD-10-CM

## 2024-12-02 DIAGNOSIS — I10 HYPERTENSION, UNSPECIFIED TYPE: ICD-10-CM

## 2024-12-02 DIAGNOSIS — N13.9 LOWER URINARY OBSTRUCTIVE SYMPTOM: ICD-10-CM

## 2024-12-02 DIAGNOSIS — I77.810 ASCENDING AORTA DILATION (H): ICD-10-CM

## 2024-12-02 DIAGNOSIS — I25.10 CORONARY ARTERY CALCIFICATION SEEN ON CT SCAN: ICD-10-CM

## 2024-12-02 DIAGNOSIS — Z00.00 MEDICARE ANNUAL WELLNESS VISIT, SUBSEQUENT: Primary | ICD-10-CM

## 2024-12-02 DIAGNOSIS — Z12.5 SCREENING FOR PROSTATE CANCER: ICD-10-CM

## 2024-12-02 DIAGNOSIS — G47.33 OSA (OBSTRUCTIVE SLEEP APNEA): ICD-10-CM

## 2024-12-02 DIAGNOSIS — Z13.1 SCREENING FOR DIABETES MELLITUS: ICD-10-CM

## 2024-12-02 LAB
ERYTHROCYTE [DISTWIDTH] IN BLOOD BY AUTOMATED COUNT: 13 % (ref 10–15)
EST. AVERAGE GLUCOSE BLD GHB EST-MCNC: 126 MG/DL
HBA1C MFR BLD: 6 % (ref 0–5.6)
HCT VFR BLD AUTO: 41.3 % (ref 40–53)
HGB BLD-MCNC: 13.4 G/DL (ref 13.3–17.7)
MCH RBC QN AUTO: 29 PG (ref 26.5–33)
MCHC RBC AUTO-ENTMCNC: 32.4 G/DL (ref 31.5–36.5)
MCV RBC AUTO: 89 FL (ref 78–100)
PLATELET # BLD AUTO: 186 10E3/UL (ref 150–450)
RBC # BLD AUTO: 4.62 10E6/UL (ref 4.4–5.9)
WBC # BLD AUTO: 8.5 10E3/UL (ref 4–11)

## 2024-12-02 PROCEDURE — 80053 COMPREHEN METABOLIC PANEL: CPT | Performed by: INTERNAL MEDICINE

## 2024-12-02 PROCEDURE — G0103 PSA SCREENING: HCPCS | Performed by: INTERNAL MEDICINE

## 2024-12-02 PROCEDURE — 82306 VITAMIN D 25 HYDROXY: CPT | Performed by: INTERNAL MEDICINE

## 2024-12-02 PROCEDURE — 83036 HEMOGLOBIN GLYCOSYLATED A1C: CPT | Performed by: INTERNAL MEDICINE

## 2024-12-02 PROCEDURE — 82570 ASSAY OF URINE CREATININE: CPT | Performed by: INTERNAL MEDICINE

## 2024-12-02 PROCEDURE — 99214 OFFICE O/P EST MOD 30 MIN: CPT | Mod: 25 | Performed by: INTERNAL MEDICINE

## 2024-12-02 PROCEDURE — 82607 VITAMIN B-12: CPT | Performed by: INTERNAL MEDICINE

## 2024-12-02 PROCEDURE — 36415 COLL VENOUS BLD VENIPUNCTURE: CPT | Performed by: INTERNAL MEDICINE

## 2024-12-02 PROCEDURE — G0439 PPPS, SUBSEQ VISIT: HCPCS | Performed by: INTERNAL MEDICINE

## 2024-12-02 PROCEDURE — 82043 UR ALBUMIN QUANTITATIVE: CPT | Performed by: INTERNAL MEDICINE

## 2024-12-02 PROCEDURE — 85027 COMPLETE CBC AUTOMATED: CPT | Performed by: INTERNAL MEDICINE

## 2024-12-02 PROCEDURE — 80061 LIPID PANEL: CPT | Performed by: INTERNAL MEDICINE

## 2024-12-02 RX ORDER — ATORVASTATIN CALCIUM 20 MG/1
20 TABLET, FILM COATED ORAL DAILY
Qty: 90 TABLET | Refills: 3 | Status: SHIPPED | OUTPATIENT
Start: 2024-12-02

## 2024-12-02 RX ORDER — AMLODIPINE BESYLATE 5 MG/1
TABLET ORAL
Qty: 90 TABLET | Refills: 2 | Status: SHIPPED | OUTPATIENT
Start: 2024-12-02

## 2024-12-02 RX ORDER — AMLODIPINE BESYLATE 5 MG/1
TABLET ORAL
Qty: 90 TABLET | Refills: 2 | Status: SHIPPED | OUTPATIENT
Start: 2024-12-02 | End: 2024-12-02

## 2024-12-02 RX ORDER — TAMSULOSIN HYDROCHLORIDE 0.4 MG/1
0.4 CAPSULE ORAL DAILY
Qty: 90 CAPSULE | Refills: 3 | Status: SHIPPED | OUTPATIENT
Start: 2024-12-02

## 2024-12-02 RX ORDER — LOSARTAN POTASSIUM 100 MG/1
100 TABLET ORAL DAILY
Qty: 90 TABLET | Refills: 3 | Status: SHIPPED | OUTPATIENT
Start: 2024-12-02

## 2024-12-02 ASSESSMENT — PAIN SCALES - GENERAL: PAINLEVEL_OUTOF10: NO PAIN (0)

## 2024-12-02 NOTE — TELEPHONE ENCOUNTER
"TO PCP:     Pharmacy calling for clarification on the Amlodipine medication     Sig states \"start with half tablet daily\" - they need clarification on this, how long is pt to take a half tab before increasing to full tab?      Disp Refills Start End PATTI   amLODIPine (NORVASC) 5 MG tablet 90 tablet 2 12/2/2024 -- No   Sig: Start with half tablet daily   Sent to pharmacy as: amLODIPine Besylate 5 MG Oral Tablet (NORVASC)   Class: E-Prescribe   Order: [id number]   E-Prescribing Status: Receipt confirmed by pharmacy (12/2/2024  2:26 PM CST)     Printout Tracking    External Result Report     Medication Administration Instructions    Start with half tablet daily     Pharmacy    [id number] - [place] - [id number]     "

## 2024-12-02 NOTE — TELEPHONE ENCOUNTER
Tom Oneal MD  [facility]1 hour ago (3:01 PM)     KS  Provider sent message to the pharmacy.  Amlodipine 5 mg half tablet daily.  Patient will send us blood pressure readings and we will decide on any adjustment of dose.    Dr Oneal

## 2024-12-02 NOTE — PROGRESS NOTES
Preventive Care Visit  [facility]  Tom Oneal MD, Internal Medicine  Dec 2, 2024      Assessment & Plan   Problem List Items Addressed This Visit       Hypertension, unspecified type    Relevant Medications    losartan (COZAAR) 100 MG tablet    amLODIPine (NORVASC) 5 MG tablet    Other Relevant Orders    CBC with platelets    Comprehensive metabolic panel (BMP + Alb, Alk Phos, ALT, AST, Total. Bili, TP)    Albumin Random Urine Quantitative with Creat Ratio    Ascending aorta dilation (H)    RICHIE (obstructive sleep apnea)    Coronary artery calcification seen on CT scan    Relevant Medications    atorvastatin (LIPITOR) 20 MG tablet    ILD (interstitial lung disease) (H)    Relevant Orders    Adult Pulmonary Medicine  Referral    Hyperlipidemia LDL goal <100    Relevant Medications    atorvastatin (LIPITOR) 20 MG tablet    Other Relevant Orders    Lipid panel reflex to direct LDL Non-fasting     Other Visit Diagnoses       Medicare annual wellness visit, subsequent    -  Primary    Lower urinary obstructive symptom        Relevant Medications    tamsulosin (FLOMAX) 0.4 MG capsule    Other Relevant Orders    PSA, screen    Primary hypertension        Relevant Medications    losartan (COZAAR) 100 MG tablet    Screening for diabetes mellitus        Relevant Orders    Hemoglobin A1c    Encounter for vitamin deficiency screening        Relevant Orders    Vitamin B12    Vitamin D Deficiency    Screening for prostate cancer        Relevant Orders    PSA, screen    Disorder of bone, unspecified        Relevant Orders    Vitamin D Deficiency        History of ILD referred to pulmonary specialist.  Has soft crackles on auscultation left lower lobe with pulse ox 97% on room air he will need further testing including PFTs and possible imaging.  He does have lower urinary tract obstructive symptoms stable maintained on tamsulosin recheck PSA.  Check rest of labs he does have degenerative arthritic  "changes and cervical spine/disorder of the bone will check vitamin D level accordingly, check vitamin B12 level as he is taking supplements.  Repeat chemistry panel urine microalbumin.  All questions answered.  He is up-to-date on his vaccines.    Patient has been advised of split billing requirements and indicates understanding: Yes       BMI  Estimated body mass index is 29.7 kg/m  as calculated from the following:    Height as of this encounter: 1.676 m (5' 6\").    Weight as of this encounter: 83.5 kg (184 lb).   Weight management plan: Discussed healthy diet and exercise guidelines    Counseling  Appropriate preventive services were addressed with this patient via screening, questionnaire, or discussion as appropriate for fall prevention, nutrition, physical activity, Tobacco-use cessation, social engagement, weight loss and cognition.  Checklist reviewing preventive services available has been given to the patient.  Reviewed patient's diet, addressing concerns and/or questions.   He is at risk for lack of exercise and has been provided with information to increase physical activity for the benefit of his well-being.   The patient was instructed to see the dentist every 6 months.     CONSULTATION/REFERRAL to pulmonary  Work on weight loss  Regular exercise  See Patient Instructions      Subjective   Nicky is a 84 year old, presenting for the following:  Physical         No data to display                    HPI  Patient presents for his yearly wellness.  He is traveling back from [place].  He is seeing cardiology, losartan dose was increased to 100 mg, he reports his blood pressure at home ranges in the high 130s systolic to 140s.  He has aortic dilation of the ascending aorta monitored by cardiology the last imaging shows slight decrease.  Also has severe coronary calcifications maintained on statins.  He has history of pulmonary fibrosis mild was evaluated in [facility] describes some difficulty with running " or doing strenuous exertion with his symptoms walk 10,000 steps a day he does feel tired with fast walking which she does not do.  He had COVID vaccines x 5.  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  Last colonoscopy was December 29, 2017 he is not pursuing that anymore.  He is also on tamsulosin has been taking daily in addition to vitamin B12 and vitamin D and low-dose aspirin.  Health Care Directive  Patient does not have a Health Care Directive: Discussed advance care planning with patient; information given to patient to review.      11/27/2024   General Health   How would you rate your overall physical health? Good   Feel stress (tense, anxious, or unable to sleep) Not at all            11/27/2024   Nutrition   Diet: Regular (no restrictions)            11/27/2024   Exercise   Days per week of moderate/strenous exercise 3 days   Average minutes spent exercising at this level 40 min            11/27/2024   Social Factors   Frequency of gathering with friends or relatives More than three times a week   Worry food won't last until get money to buy more Patient declined   Food not last or not have enough money for food? Patient declined   Do you have housing? (Housing is defined as stable permanent housing and does not include staying ouside in a car, in a tent, in an abandoned building, in an overnight shelter, or couch-surfing.) Patient declined   Are you worried about losing your housing? Patient declined   Lack of transportation? Patient declined   Unable to get utilities (heat,electricity)? Patient declined            11/27/2024   Fall Risk   Fallen 2 or more times in the past year? No     No    Trouble with walking or balance? No     No        Patient-reported    Multiple values from one day are sorted in reverse-chronological order          11/27/2024   Activities of Daily Living- Home Safety   Needs help with the following daily  activites None of the above   Safety concerns in the home None of the above            11/27/2024   Dental   Dentist two times every year? (!) NO            11/27/2024   Hearing Screening   Hearing concerns? None of the above            11/27/2024   Driving Risk Screening   Patient/family members have concerns about driving No            11/27/2024   General Alertness/Fatigue Screening   Have you been more tired than usual lately? No            11/27/2024   Urinary Incontinence Screening   Bothered by leaking urine in past 6 months No            11/27/2024   TB Screening   Were you born outside of the [place]? Yes            Today's PHQ-2 Score:       12/2/2024     1:32 PM   PHQ-2 ( 1999 [employer])   Q1: Little interest or pleasure in doing things 0    Q2: Feeling down, depressed or hopeless 0    PHQ-2 Score 0    Q1: Little interest or pleasure in doing things Not at all   Q2: Feeling down, depressed or hopeless Not at all   PHQ-2 Score 0       Patient-reported           11/27/2024   Substance Use   Alcohol more than 3/day or more than 7/wk No   Do you have a current opioid prescription? No   How severe/bad is pain from 1 to 10? 0/10 (No Pain)   Do you use any other substances recreationally? No        Social History     Tobacco Use    Smoking status: Former     Types: Pipe    Smokeless tobacco: Never    Tobacco comments:     pipe mainly stopped 5.5 yrs ago   Substance Use Topics    Alcohol use: Yes     Comment: occasional.    Drug use: Never                 Reviewed and updated as needed this visit by Provider      Problems               Past Medical History:   Diagnosis Date    Abdominal aortic aneurysm (AAA) without rupture (H) 10/12/2019    Hyperlipidemia LDL goal <100 10/12/2019    Hypertension, unspecified type 10/12/2019    RICHIE (obstructive sleep apnea) 10/12/2019     Past Surgical History:   Procedure Laterality Date    ANTERIOR VITRECTOMY UNPLANNED Left 11/27/2023    Procedure: ANTERIOR VITRECTOMY UNPLANNED;   Surgeon: Ankit Fitzpatrick MD;  [place] [facility] [place]    COLONOSCOPY  2017    PHACOEMULSIFICATION CLEAR CORNEA WITH STANDARD INTRAOCULAR LENS IMPLANT Left 11/27/2023    Procedure: LEFT EYE COMPLEX PHACOEMULSIFICATION, CATARACT, WITH INTRAOCULAR LENS IMPLANT;  Surgeon: Ankit Fitzpatrick MD;  Location: [facility] OR    PHACOEMULSIFICATION CLEAR CORNEA WITH STANDARD INTRAOCULAR LENS IMPLANT Right 12/4/2023    Procedure: RIGHT EYE PHACOEMULSIFICATION, COMPLEX CATARACT, WITH INTRAOCULAR LENS IMPLANT;  Surgeon: Ankit Fitzpatrick MD;  Location: [facility] [place]     Lab work is in process  Labs reviewed in EPIC  BP Readings from Last 3 Encounters:   12/02/24 139/73   05/10/24 (!) 162/81   12/04/23 (!) 150/79    Wt Readings from Last 3 Encounters:   12/02/24 83.5 kg (184 lb)   09/20/24 80.3 kg (177 lb)   05/10/24 80.4 kg (177 lb 3.2 oz)                  Patient Active Problem List   Diagnosis    Hypertension, unspecified type    Ascending aorta dilation (H)    Skin lesion    RICHIE (obstructive sleep apnea)    Coronary artery calcification seen on CT scan    ILD (interstitial lung disease) (H)    Exposure to asbestos    IFG (impaired fasting glucose)    Nuclear senile cataract of both eyes    Abdominal aortic aneurysm (AAA) without rupture (H)    Hyperlipidemia LDL goal <100     Past Surgical History:   Procedure Laterality Date    ANTERIOR VITRECTOMY UNPLANNED Left 11/27/2023    Procedure: ANTERIOR VITRECTOMY UNPLANNED;  Surgeon: Ankit Fitzpatrick MD;  [place]: [facility] [place]    COLONOSCOPY  2017    PHACOEMULSIFICATION CLEAR CORNEA WITH STANDARD INTRAOCULAR LENS IMPLANT Left 11/27/2023    Procedure: LEFT EYE COMPLEX PHACOEMULSIFICATION, CATARACT, WITH INTRAOCULAR LENS IMPLANT;  Surgeon: Ankit Fitzpatrick MD;  Location: [facility] OR    PHACOEMULSIFICATION CLEAR CORNEA WITH STANDARD INTRAOCULAR LENS IMPLANT Right 12/4/2023    Procedure: RIGHT EYE PHACOEMULSIFICATION, COMPLEX CATARACT, WITH INTRAOCULAR LENS  IMPLANT;  Surgeon: Ankit Fitzpatrick MD;  Location: [facility] OR       Social History     Tobacco Use    Smoking status: Former     Types: Pipe    Smokeless tobacco: Never    Tobacco comments:     pipe mainly stopped 5.5 yrs ago   Substance Use Topics    Alcohol use: Yes     Comment: occasional.     Family History   Problem Relation Age of Onset    Glaucoma Mother     Lung Cancer Father     Macular Degeneration No family hx of          Current Outpatient Medications   Medication Sig Dispense Refill    amLODIPine (NORVASC) 5 MG tablet Start with half tablet daily 90 tablet 2    aspirin (ASA) 81 MG tablet Take 81 mg by mouth daily      atorvastatin (LIPITOR) 20 MG tablet Take 1 tablet (20 mg) by mouth daily. 90 tablet 3    B Complex-Folic Acid (B COMPLEX-VITAMIN B12 PO) Take 1,000 mg by mouth daily      losartan (COZAAR) 100 MG tablet Take 1 tablet (100 mg) by mouth daily. 90 tablet 3    tamsulosin (FLOMAX) 0.4 MG capsule Take 1 capsule (0.4 mg) by mouth daily. 90 capsule 3    Vitamin D3 (CHOLECALCIFEROL) 125 MCG (5000 UT) tablet Take by mouth daily       No Known Allergies  Recent Labs   Lab Test 05/16/24  1119 11/22/23  0806 11/21/22  1351 10/22/21  0814 10/22/21  0814 02/12/21  1211 10/12/20  0922   A1C  --  5.8* 5.8*  --   --   --   --    LDL  --  65 87  --  73 38 117*   HDL  --  44 39*  --  44 46 41   TRIG  --  119 252*  --  129 124 161*   ALT  --  22  --   --  35 44 23   CR 0.94 1.03 0.84   < > 0.92 0.96 0.86   GFRESTIMATED 81 73 88   < > 78 75 82   GFRESTBLACK  --   --   --   --   --  86 >90   POTASSIUM 4.5 4.1 4.1   < > 4.1 4.4 4.1    < > = values in this interval not displayed.      Current providers sharing in care for this patient include:  Patient Care Team:  Tom Oneal MD as PCP - General (Internal Medicine)  Tom Oneal MD (Internal Medicine)  Justo Connolly MD as Assigned Heart and Vascular Provider  Catalino Cardona OD (Optometry)  Ankit Fitzpatrick MD as  "Assigned Surgical Provider  Tom Oneal MD as Assigned PCP  Goltz, Bennett Ezra, PA-C as Assigned Neuroscience Provider    The following health maintenance items are reviewed in Epic and correct as of today:  Health Maintenance   Topic Date Due    RSV VACCINE (1 - 1-dose 75+ series) Never done    ANNUAL REVIEW OF HM ORDERS  11/21/2023    INFLUENZA VACCINE (1) 09/01/2024    COVID-19 Vaccine (7 - 2024-25 season) 09/01/2024    LIPID  11/22/2024    ZOSTER IMMUNIZATION (2 of 2) 04/11/2024    BMP  05/16/2025    MEDICARE ANNUAL WELLNESS VISIT  12/02/2025    FALL RISK ASSESSMENT  12/02/2025    ADVANCE CARE PLANNING  12/02/2029    PHQ-2 (once per calendar year)  Completed    Pneumococcal Vaccine: 65+ Years  Completed    HPV IMMUNIZATION  Aged Out    MENINGITIS IMMUNIZATION  Aged Out    RSV MONOCLONAL ANTIBODY  Aged Out    DTAP/TDAP/TD IMMUNIZATION  Discontinued         Review of Systems  Constitutional, neuro, ENT, endocrine, pulmonary, cardiac, gastrointestinal, genitourinary, musculoskeletal, integument and psychiatric systems are negative, except as otherwise noted.     Objective    Exam  /73   Pulse 64   Temp 97.6  F (36.4  C) (Oral)   Resp 16   Ht 1.676 m (5' 6\")   Wt 83.5 kg (184 lb)   SpO2 97%   BMI 29.70 kg/m     Estimated body mass index is 29.7 kg/m  as calculated from the following:    Height as of this encounter: 1.676 m (5' 6\").    Weight as of this encounter: 83.5 kg (184 lb).    Physical Exam  GENERAL: alert and no distress  EYES: Eyes grossly normal to inspection, PERRL and conjunctivae and sclerae normal  HENT: ear canals and TM's normal, nose and mouth without ulcers or lesions  NECK: no adenopathy, no asymmetry, masses, or scars  RESP: lungs clear to auscultation - no rales, rhonchi or wheezes  CV: regular rate and rhythm, normal S1 S2, no S3 or S4, no murmur, click or rub, no peripheral edema  ABDOMEN: soft, nontender, no hepatosplenomegaly, no masses and bowel sounds normal  MS: no " gross musculoskeletal defects noted, no edema  SKIN: keratoses - seborrheic right temple/scalp area around 1 cm in dimension raised slightly scaly  NEURO: Normal strength and tone, mentation intact and speech normal  PSYCH: mentation appears normal, affect normal/bright        12/2/2024   Mini Cog   Clock Draw Score 2 Normal   3 Item Recall 3 objects recalled   Mini Cog Total Score 5                 Signed Electronically by: Tom Oneal MD

## 2024-12-03 LAB
ALBUMIN SERPL BCG-MCNC: 4.3 G/DL (ref 3.5–5.2)
ALP SERPL-CCNC: 102 U/L (ref 40–150)
ALT SERPL W P-5'-P-CCNC: 27 U/L (ref 0–70)
ANION GAP SERPL CALCULATED.3IONS-SCNC: 8 MMOL/L (ref 7–15)
AST SERPL W P-5'-P-CCNC: 30 U/L (ref 0–45)
BILIRUB SERPL-MCNC: 0.4 MG/DL
BUN SERPL-MCNC: 14.5 MG/DL (ref 8–23)
CALCIUM SERPL-MCNC: 9.7 MG/DL (ref 8.8–10.4)
CHLORIDE SERPL-SCNC: 104 MMOL/L (ref 98–107)
CHOLEST SERPL-MCNC: 119 MG/DL
CREAT SERPL-MCNC: 0.87 MG/DL (ref 0.67–1.17)
CREAT UR-MCNC: 89.2 MG/DL
EGFRCR SERPLBLD CKD-EPI 2021: 86 ML/MIN/1.73M2
FASTING STATUS PATIENT QL REPORTED: NO
FASTING STATUS PATIENT QL REPORTED: NO
GLUCOSE SERPL-MCNC: 101 MG/DL (ref 70–99)
HCO3 SERPL-SCNC: 27 MMOL/L (ref 22–29)
HDLC SERPL-MCNC: 42 MG/DL
LDLC SERPL CALC-MCNC: 45 MG/DL
MICROALBUMIN UR-MCNC: <12 MG/L
MICROALBUMIN/CREAT UR: NORMAL MG/G{CREAT}
NONHDLC SERPL-MCNC: 77 MG/DL
POTASSIUM SERPL-SCNC: 3.8 MMOL/L (ref 3.4–5.3)
PROT SERPL-MCNC: 7.1 G/DL (ref 6.4–8.3)
PSA SERPL DL<=0.01 NG/ML-MCNC: 1.22 NG/ML
SODIUM SERPL-SCNC: 139 MMOL/L (ref 135–145)
TRIGL SERPL-MCNC: 162 MG/DL
VIT B12 SERPL-MCNC: 2980 PG/ML (ref 232–1245)
VIT D+METAB SERPL-MCNC: 39 NG/ML (ref 20–50)

## 2024-12-05 ENCOUNTER — TELEPHONE (OUTPATIENT)
Dept: PULMONOLOGY | Facility: CLINIC | Age: 84
End: 2024-12-05
Payer: MEDICARE

## 2024-12-05 DIAGNOSIS — J84.9 ILD (INTERSTITIAL LUNG DISEASE) (H): Primary | ICD-10-CM

## 2024-12-05 NOTE — TELEPHONE ENCOUNTER
ILD New Patient Referral: Pre visit communication    Patient: Nicky Hernández  Reason for Referral: ILD  Referring Physician: PCP  Referring Clinic/Contact: Phone#:     Chest CT scan: 2005 ([facility]) and 2020 [place]    Biopsy: na  PFT's:[facility]   Additional testing:     Current symptoms: sob, cough at times   Current related prescriptions:     Supplemental oxygen? YES.     In review of apparent records and conversation with patient; recommend first visit with ILD provider be conducted :  Testing needed: CT scan and Full PFT's and walk    Additional notes:          Pt was also worked up in [place] back 9 years ago.

## 2024-12-19 ENCOUNTER — ANCILLARY PROCEDURE (OUTPATIENT)
Dept: GENERAL RADIOLOGY | Facility: CLINIC | Age: 84
End: 2024-12-19
Attending: PHYSICIAN ASSISTANT
Payer: MEDICARE

## 2024-12-19 ENCOUNTER — OFFICE VISIT (OUTPATIENT)
Dept: URGENT CARE | Facility: URGENT CARE | Age: 84
End: 2024-12-19
Payer: MEDICARE

## 2024-12-19 VITALS
OXYGEN SATURATION: 97 % | BODY MASS INDEX: 29.7 KG/M2 | WEIGHT: 184 LBS | SYSTOLIC BLOOD PRESSURE: 143 MMHG | DIASTOLIC BLOOD PRESSURE: 74 MMHG | HEART RATE: 76 BPM | TEMPERATURE: 97.2 F | RESPIRATION RATE: 16 BRPM

## 2024-12-19 DIAGNOSIS — R05.1 ACUTE COUGH: ICD-10-CM

## 2024-12-19 DIAGNOSIS — J84.9 ILD (INTERSTITIAL LUNG DISEASE) (H): Primary | ICD-10-CM

## 2024-12-19 DIAGNOSIS — Z87.09 HISTORY OF COPD: ICD-10-CM

## 2024-12-19 RX ORDER — BENZONATATE 200 MG/1
200 CAPSULE ORAL 3 TIMES DAILY PRN
Qty: 30 CAPSULE | Refills: 0 | Status: SHIPPED | OUTPATIENT
Start: 2024-12-19

## 2024-12-19 ASSESSMENT — ENCOUNTER SYMPTOMS
RHINORRHEA: 1
MYALGIAS: 0
CHEST TIGHTNESS: 1
APPETITE CHANGE: 0
HEADACHES: 0
COUGH: 1
FEVER: 0
NAUSEA: 0
VOMITING: 0
DIARRHEA: 0
SHORTNESS OF BREATH: 0
SORE THROAT: 1

## 2024-12-19 NOTE — PROGRESS NOTES
SUBJECTIVE:   Nicky Hernández is a 84 year old male presenting with a chief complaint of   Chief Complaint   Patient presents with    Cough     4 days, chest congestion, sob       He is an established patient of [facility].  Patient presents with productive cough - yellow and white x 4-5 days.  No SOB.  Cough keeps up at night.  No covid test.  Chest tightness.  No longer smoking.  Appointment with pulmonology in [facility].      Treatment:  dayquil and niquil.  Tea, juice, honey, riccoli.      Review of Systems   Constitutional:  Negative for appetite change and fever.   HENT:  Positive for rhinorrhea and sore throat. Negative for congestion and ear pain.    Respiratory:  Positive for cough and chest tightness. Negative for shortness of breath.    Cardiovascular:  Negative for chest pain.   Gastrointestinal:  Negative for diarrhea, nausea and vomiting.   Musculoskeletal:  Negative for myalgias.   Skin:  Negative for rash.   Neurological:  Negative for headaches.   All other systems reviewed and are negative.      Past Medical History:   Diagnosis Date    Abdominal aortic aneurysm (AAA) without rupture (H) 10/12/2019    Hyperlipidemia LDL goal <100 10/12/2019    Hypertension, unspecified type 10/12/2019    RICHIE (obstructive sleep apnea) 10/12/2019     Family History   Problem Relation Age of Onset    Glaucoma Mother     Lung Cancer Father     Macular Degeneration No family hx of      Current Outpatient Medications   Medication Sig Dispense Refill    amLODIPine (NORVASC) 5 MG tablet Half tablet oral daily. 90 tablet 2    aspirin (ASA) 81 MG tablet Take 81 mg by mouth daily      atorvastatin (LIPITOR) 20 MG tablet Take 1 tablet (20 mg) by mouth daily. 90 tablet 3    B Complex-Folic Acid (B COMPLEX-VITAMIN B12 PO) Take 1,000 mg by mouth daily      benzonatate (TESSALON) 200 MG capsule Take 1 capsule (200 mg) by mouth 3 times daily as needed for cough. 30 capsule 0    losartan (COZAAR) 100 MG tablet Take 1 tablet (100 mg) by  mouth daily. 90 tablet 3    tamsulosin (FLOMAX) 0.4 MG capsule Take 1 capsule (0.4 mg) by mouth daily. 90 capsule 3    Vitamin D3 (CHOLECALCIFEROL) 125 MCG (5000 UT) tablet Take by mouth daily       Social History     Tobacco Use    Smoking status: Former     Types: Pipe    Smokeless tobacco: Never    Tobacco comments:     pipe mainly stopped 5.5 yrs ago   Substance Use Topics    Alcohol use: Yes     Comment: occasional.       OBJECTIVE  BP (!) 143/74   Pulse 76   Temp 97.2  F (36.2  C)   Resp 16   Wt 83.5 kg (184 lb)   SpO2 97%   BMI 29.70 kg/m      Physical Exam    Labs:  Results for orders placed or performed in visit on 12/19/24 (from the past 24 hours)   XR Chest 2 Views    Narrative    CHEST TWO VIEWS 12/19/2024 3:41 PM     HISTORY: Acute cough.    COMPARISON: None.       Impression    IMPRESSION: Mild interstitial and/or fibrotic changes. There are no  acute infiltrates. The cardiac silhouette is not enlarged. Pulmonary  vasculature is unremarkable.       X-Ray was done, my findings are: Discussed reasons to seek immediate medical attention.  Additionally if no improvement or worsening in one week, may follow up with PCP and/or UC.        ASSESSMENT:      ICD-10-CM    1. ILD (interstitial lung disease) (H)  J84.9       2. Acute cough  R05.1 XR Chest 2 Views     COVID-19 Virus (Coronavirus) by PCR Nose     benzonatate (TESSALON) 200 MG capsule      3. History of COPD  Z87.09            Medical Decision Making:    Differential Diagnosis:  Pneumonia, bronchitis      Serious Comorbid Conditions:  Adult:  COPD and reviewed    PLAN:    CXR performed due to concerns of pneumonia.  Rx for tessalon perles.  Discussed reasons to seek immediate medical attention.  Additionally if no improvement or worsening in one week, may follow up with PCP and/or UC.        Followup:    If not improving or if condition worsens, follow up with your Primary Care Provider, If not improving or if conditions worsens over the next  12-24 hours, go to the Emergency Department    There are no Patient Instructions on file for this visit.

## 2025-01-07 DIAGNOSIS — H35.89 MACULAR ATROPHY, RETINAL: Primary | ICD-10-CM

## 2025-01-07 DIAGNOSIS — H47.233 CUPPING OF OPTIC DISC, BILATERAL: ICD-10-CM

## 2025-01-15 ENCOUNTER — OFFICE VISIT (OUTPATIENT)
Dept: OPHTHALMOLOGY | Facility: CLINIC | Age: 85
End: 2025-01-15
Attending: OPHTHALMOLOGY
Payer: MEDICARE

## 2025-01-15 DIAGNOSIS — H35.3190 AGE-RELATED MACULAR DEGENERATION WITH CENTRAL GEOGRAPHIC ATROPHY: ICD-10-CM

## 2025-01-15 DIAGNOSIS — H47.233 CUPPING OF OPTIC DISC, BILATERAL: ICD-10-CM

## 2025-01-15 DIAGNOSIS — Z96.1 PSEUDOPHAKIA OF BOTH EYES: ICD-10-CM

## 2025-01-15 DIAGNOSIS — H35.89 MACULAR ATROPHY, RETINAL: Primary | ICD-10-CM

## 2025-01-15 DIAGNOSIS — H04.123 DRY EYE SYNDROME OF BOTH EYES: ICD-10-CM

## 2025-01-15 PROCEDURE — 92134 CPTRZ OPH DX IMG PST SGM RTA: CPT | Performed by: OPHTHALMOLOGY

## 2025-01-15 PROCEDURE — 92015 DETERMINE REFRACTIVE STATE: CPT | Mod: GY

## 2025-01-15 PROCEDURE — 92250 FUNDUS PHOTOGRAPHY W/I&R: CPT | Performed by: OPHTHALMOLOGY

## 2025-01-15 PROCEDURE — 92133 CPTRZD OPH DX IMG PST SGM ON: CPT | Performed by: OPHTHALMOLOGY

## 2025-01-15 PROCEDURE — G0463 HOSPITAL OUTPT CLINIC VISIT: HCPCS | Performed by: OPHTHALMOLOGY

## 2025-01-15 ASSESSMENT — VISUAL ACUITY
OS_SC: 20/25
METHOD: SNELLEN - LINEAR
OS_SC+: -1
OD_SC+: -2
OD_SC: 20/60
OD_PH_SC: 20/40

## 2025-01-15 ASSESSMENT — CONF VISUAL FIELD
OD_SUPERIOR_NASAL_RESTRICTION: 0
OS_SUPERIOR_NASAL_RESTRICTION: 0
OD_SUPERIOR_TEMPORAL_RESTRICTION: 0
OS_SUPERIOR_TEMPORAL_RESTRICTION: 0
OD_INFERIOR_NASAL_RESTRICTION: 0
OS_INFERIOR_TEMPORAL_RESTRICTION: 0
OS_NORMAL: 1
OS_INFERIOR_NASAL_RESTRICTION: 0
OD_INFERIOR_TEMPORAL_RESTRICTION: 0
METHOD: COUNTING FINGERS
OD_NORMAL: 1

## 2025-01-15 ASSESSMENT — REFRACTION_MANIFEST
OS_AXIS: 178
OD_SPHERE: -0.50
OD_CYLINDER: +1.50
OS_SPHERE: +0.25
OD_ADD: +2.75
OS_ADD: +2.75
OD_AXIS: 178
OS_CYLINDER: +2.00

## 2025-01-15 ASSESSMENT — EXTERNAL EXAM - LEFT EYE: OS_EXAM: NORMAL

## 2025-01-15 ASSESSMENT — SLIT LAMP EXAM - LIDS
COMMENTS: NORMAL
COMMENTS: NORMAL

## 2025-01-15 ASSESSMENT — TONOMETRY
IOP_METHOD: ICARE
OS_IOP_MMHG: 11
OD_IOP_MMHG: 12

## 2025-01-15 NOTE — NURSING NOTE
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     Patient states vision is good each eye at distance. \"I am using +3.00 OTC readers. I feel I am not seeing as well with the +3.00 readers. Before I had a different power in both eyes. I don't want distance glasses, only readers only.\" Denies floaters or flashes.     Nadia Russell, COT 1:04 PM 01/15/2025       Chief Complaint(s) and History of Present Illness(es)       Retinal Evaluation              Laterality: both eyes    Onset: years ago    Associated symptoms: dryness (using ATs (Systane) a few times a week).  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     Patient states vision is good each eye at distance. \"I am using +3.00 OTC readers. I feel I am not seeing as well with the +3.00 readers. Before I had a different power in both eyes. I don't want distance glasses, only readers only.\" Denies floaters or flashes.     Nadia Russell, COT 1:04 PM 01/15/2025                      "

## 2025-03-07 ENCOUNTER — ANCILLARY PROCEDURE (OUTPATIENT)
Dept: CT IMAGING | Facility: CLINIC | Age: 85
End: 2025-03-07
Attending: INTERNAL MEDICINE
Payer: MEDICARE

## 2025-03-07 DIAGNOSIS — J84.9 ILD (INTERSTITIAL LUNG DISEASE) (H): ICD-10-CM

## 2025-03-07 PROCEDURE — 71250 CT THORAX DX C-: CPT | Mod: GC | Performed by: RADIOLOGY

## 2025-05-09 ENCOUNTER — HOSPITAL ENCOUNTER (OUTPATIENT)
Dept: CARDIOLOGY | Facility: CLINIC | Age: 85
Discharge: HOME | End: 2025-05-09
Attending: INTERNAL MEDICINE | Admitting: INTERNAL MEDICINE
Payer: MEDICARE

## 2025-05-09 ENCOUNTER — RESULTS FOLLOW-UP (OUTPATIENT)
Dept: CARDIOLOGY | Facility: CLINIC | Age: 85
End: 2025-05-09

## 2025-05-09 DIAGNOSIS — I77.810 ASCENDING AORTA DILATATION: ICD-10-CM

## 2025-05-09 DIAGNOSIS — I10 PRIMARY HYPERTENSION: ICD-10-CM

## 2025-05-09 DIAGNOSIS — I77.810 ASCENDING AORTA DILATION: Primary | ICD-10-CM

## 2025-05-09 LAB — LVEF ECHO: NORMAL

## 2025-05-09 PROCEDURE — C8929 TTE W OR WO FOL WCON,DOPPLER: HCPCS

## 2025-05-09 PROCEDURE — 93306 TTE W/DOPPLER COMPLETE: CPT | Mod: 26 | Performed by: INTERNAL MEDICINE

## 2025-05-09 PROCEDURE — 255N000002 HC RX 255 OP 636: Performed by: INTERNAL MEDICINE

## 2025-05-09 RX ADMIN — HUMAN ALBUMIN MICROSPHERES AND PERFLUTREN 9 ML: 10; .22 INJECTION, SOLUTION INTRAVENOUS at 11:10

## 2025-05-12 ENCOUNTER — TELEPHONE (OUTPATIENT)
Dept: CARDIOLOGY | Facility: CLINIC | Age: 85
End: 2025-05-12

## 2025-05-12 ENCOUNTER — OFFICE VISIT (OUTPATIENT)
Dept: CARDIOLOGY | Facility: CLINIC | Age: 85
End: 2025-05-12
Payer: MEDICARE

## 2025-05-12 ENCOUNTER — RESULTS FOLLOW-UP (OUTPATIENT)
Dept: CARDIOLOGY | Facility: CLINIC | Age: 85
End: 2025-05-12

## 2025-05-12 VITALS
HEIGHT: 66 IN | SYSTOLIC BLOOD PRESSURE: 147 MMHG | WEIGHT: 183 LBS | DIASTOLIC BLOOD PRESSURE: 76 MMHG | HEART RATE: 61 BPM | BODY MASS INDEX: 29.41 KG/M2

## 2025-05-12 DIAGNOSIS — I10 PRIMARY HYPERTENSION: ICD-10-CM

## 2025-05-12 DIAGNOSIS — I77.810 ASCENDING AORTA DILATATION: Primary | ICD-10-CM

## 2025-05-12 DIAGNOSIS — I10 HYPERTENSION, UNSPECIFIED TYPE: ICD-10-CM

## 2025-05-12 DIAGNOSIS — J84.9 ILD (INTERSTITIAL LUNG DISEASE) (H): ICD-10-CM

## 2025-05-12 DIAGNOSIS — G47.33 OSA (OBSTRUCTIVE SLEEP APNEA): ICD-10-CM

## 2025-05-12 DIAGNOSIS — E78.2 MIXED HYPERLIPIDEMIA: ICD-10-CM

## 2025-05-12 PROCEDURE — 3078F DIAST BP <80 MM HG: CPT | Performed by: NURSE PRACTITIONER

## 2025-05-12 PROCEDURE — 3077F SYST BP >= 140 MM HG: CPT | Performed by: NURSE PRACTITIONER

## 2025-05-12 PROCEDURE — 99214 OFFICE O/P EST MOD 30 MIN: CPT | Performed by: NURSE PRACTITIONER

## 2025-05-12 RX ORDER — AMLODIPINE BESYLATE 2.5 MG/1
2.5 TABLET ORAL AT BEDTIME
Qty: 90 TABLET | Refills: 4 | Status: SHIPPED | OUTPATIENT
Start: 2025-05-12 | End: 2025-05-13

## 2025-05-12 NOTE — TELEPHONE ENCOUNTER
amLODIPine (NORVASC) 2.5 MG tablet 90 tablet 4 5/12/2025 -- No   Sig - Route: Take 1 tablet (2.5 mg) by mouth at bedtime. Half tablet oral daily. - Oral   Sent to pharmacy as: amLODIPine Besylate 2.5 MG Oral Tablet (NORVASC)     Pharmacy called Is Pt to be taking 2.5 mg at bedtime? MYRNA Moss

## 2025-05-12 NOTE — PROGRESS NOTES
~Cardiology Clinic Visit~    Primary Cardiologist: Dr. Connolly  Reason for visit: Annual follow up    History of Present Illness    Nicky Hernández is an extremely pleasant 83 year old male with a history of hypertension, hyperlipidemia, and ascending aortic dilatation, here for routine follow-up.  His FH is notable for his nephew unfortunately dying of a ruptured TAA in early 2023.     Patient was seen in cardiology clinic in May 2024 for his annual visit.  At that time, he reported that he was doing well from a cardiac standpoint and had no new concerns.  He had no chest pain, shortness of breath, lower extremity swelling, palpitations, lightheadedness or any other worries.    Blood pressur was noted to be elevated with systolic in the 160s of soft 80s.  He self reports monitoring his blood pressure at home with improved readings, he notes average systolic at home in the mid 130s/low 140s.  At time, he had a lipid profile from November 2023 showing , HDL 44, LDL 65, .  He had normal BMP, hemoglobin A1c at that time at 5.8%.    Initial TTE from 11/2020 showed thoracic aorta measuring 4.3 cm.  This was followed with CT chest measuring a max diameter of 3.7 cm.  Repeat TTE 4/2022 showed maximal aortic diameter of 4.2 cm, mildly dilated RV and no other significant abnormalities.  Again, a repeat TTE from 6/2023 showed maximal aortic diameter of 3.6 cm (note, poor image quality per report).    Diagnostics:  5/9/2025 echocardiogram: EF 60-65% with normal LV wall thickness and no WMA.  RV normal.  Aortic root measuring 3.5 cm, ascending aorta measuring closer to 4.0 cm however imaging of the aorta overall was felt to be suboptimal.    Interval 05/12/25    Patient continues to do well from a cardiac standpoint.  He feels that he can do all of his regular activities without limitations.  He denies shortness of breath, chest pain, swelling.  Labs reviewed and are stable with BMP showing , K4.5,  BUN 12.8, CR 0.93, hemoglobin 13.5.  FLP from 5/9/2025 showing , HDL 45, LDL 60, TG 68.      His blood pressure today on initial check remains elevated at 147 pulse rate 61.  Patient follows with sleep medicine.  He saw his primary in December 2020 for and was started on amlodipine 5 mg daily.  He notes that he took this prescription for a while, and then stopped as he felt his blood pressure was adequately controlled.  His average home readings are systolic 130-140s.  We discussed the importance of optimizing blood pressure in context of dilated aorta.  __________________________________________________________________         Assessment and Impression:     Ascending aorta dilation  Coronary artery calcification on CT  AAA without rupture  Essential hypertension  Hyperlipidemia  RICHIE on CPAP         Recommendations and Plan:     Continue losartan 100 mg daily.  Start Eliquis 2.5 mg daily at bedtime.  Patient counseled.  Work on lifestyle modification for BP control including sodium restriction and daily exercise.  Repeat serial TTE in 1 year, approximately May 2026.  Follow-up in cardiology clinic in approximately 1-year.  __________________________________________________________________    Thank you for the opportunity to participate in this pleasant patient's care.    We would be happy to see this patient sooner for any concerns in the meantime.    NADJA Duff, CNP   Nurse Practitioner  [facility]    Today's clinic visit entailed:  The following tests were independently interpreted by me as noted in my documentation: echo, labs  Ordering of each unique test  Prescription drug management  The level of medical decision making during this visit was of moderate complexity.  Review of the result(s) of each unique test - cardiac testing, cardiac imaging, labs  Care everywhere reviewed for additional records to facilitate comprehensive patient care.  Recent hospitalization records  "and notes reviewed to facilitate comprehensive care coordination.    Orders this Visit:  Orders Placed This Encounter   Procedures    Follow-Up with Cardiology- CARLENE    Echocardiogram Complete     Orders Placed This Encounter   Medications    amLODIPine (NORVASC) 2.5 MG tablet     Sig: Take 1 tablet (2.5 mg) by mouth at bedtime. Half tablet oral daily.     Dispense:  90 tablet     Refill:  4     Medications Discontinued During This Encounter   Medication Reason    amLODIPine (NORVASC) 5 MG tablet      Encounter Diagnoses   Name Primary?    Hypertension, unspecified type     Primary hypertension     Ascending aorta dilatation Yes    Mixed hyperlipidemia     RICHIE (obstructive sleep apnea)     ILD (interstitial lung disease) (H)      CURRENT MEDICATIONS:  Current Outpatient Medications   Medication Sig Dispense Refill    amLODIPine (NORVASC) 2.5 MG tablet Take 1 tablet (2.5 mg) by mouth at bedtime. Half tablet oral daily. 90 tablet 4    aspirin (ASA) 81 MG tablet Take 81 mg by mouth daily      atorvastatin (LIPITOR) 20 MG tablet Take 1 tablet (20 mg) by mouth daily. 90 tablet 3    losartan (COZAAR) 100 MG tablet Take 1 tablet (100 mg) by mouth daily. 90 tablet 3    tamsulosin (FLOMAX) 0.4 MG capsule Take 1 capsule (0.4 mg) by mouth daily. 90 capsule 3    Vitamin D3 (CHOLECALCIFEROL) 125 MCG (5000 UT) tablet Take by mouth daily       ALLERGIES   No Known Allergies  PAST MEDICAL, SURGICAL, FAMILY, SOCIAL HISTORY:  History was reviewed and updated as needed, see medical record.    Review of Systems:  A 10-point Review Of Systems is otherwise normal except for that which is noted in the HPI and interval summary.    Physical Exam:    Vitals: BP (!) 147/76 (BP Location: Right arm, Patient Position: Sitting)   Pulse 61   Ht 1.676 m (5' 6\")   Wt 83 kg (183 lb)   BMI 29.54 kg/m    Constitutional: Appears stated age, well nourished, NAD.  Respiratory: Non-labored. Lungs clear  Cardiovascular: RRR, normal S1 and S2. No murmur.  " "No edema.  GI: Soft, non-distended, non-tender.  Skin: Warm and dry.   Musculoskeletal/Extremities: Symmetrical movement.  Neurologic: No gross focal deficits. Alert, awake.  Psychiatric: Affect appropriate. Mentation normal.    Recent Lab Results:  LIPID RESULTS:  Lab Results   Component Value Date    CHOL 119 05/09/2025    CHOL 109 02/12/2021    HDL 45 05/09/2025    HDL 46 02/12/2021    LDL 60 05/09/2025    LDL 38 02/12/2021    TRIG 68 05/09/2025    TRIG 124 02/12/2021     LIVER ENZYME RESULTS:  Lab Results   Component Value Date    AST 30 12/02/2024    AST 30 02/12/2021    ALT 21 05/09/2025    ALT 44 02/12/2021     CBC RESULTS:  Lab Results   Component Value Date    WBC 6.5 05/09/2025    WBC 6.9 02/12/2021    RBC 4.62 05/09/2025    RBC 4.61 02/12/2021    HGB 13.5 05/09/2025    HGB 13.6 02/12/2021    HCT 41.0 05/09/2025    HCT 41.4 02/12/2021    MCV 89 05/09/2025    MCV 90 02/12/2021    MCH 29.2 05/09/2025    MCH 29.5 02/12/2021    MCHC 32.9 05/09/2025    MCHC 32.9 02/12/2021    RDW 13.2 05/09/2025    RDW 13.4 02/12/2021     05/09/2025     02/12/2021     BMP RESULTS:  Lab Results   Component Value Date     05/09/2025     02/12/2021    POTASSIUM 4.5 05/09/2025    POTASSIUM 4.1 10/22/2021    POTASSIUM 4.4 02/12/2021    CHLORIDE 103 05/09/2025    CHLORIDE 104 10/22/2021    CHLORIDE 106 02/12/2021    CO2 26 05/09/2025    CO2 26 10/22/2021    CO2 26 02/12/2021    ANIONGAP 11 05/09/2025    ANIONGAP 7 10/22/2021    ANIONGAP 5 02/12/2021    GLC 98 05/09/2025    GLC 94 10/22/2021    GLC 93 02/12/2021    BUN 12.8 05/09/2025    BUN 10 10/22/2021    BUN 14 02/12/2021    CR 0.93 05/09/2025    CR 0.96 02/12/2021    GFRESTIMATED 81 05/09/2025    GFRESTIMATED 75 02/12/2021    GFRESTBLACK 86 02/12/2021    JOSE FRANCISCO 9.7 05/09/2025    JOSE FRANCISCO 9.8 02/12/2021      A1C RESULTS:  Lab Results   Component Value Date    A1C 6.0 (H) 12/02/2024     INR RESULTS:  No results found for: \"INR\"    Recent Results (from the past " 4320 hours)   Echocardiogram Complete   Result Value    LVEF  60-65%    [facility]    [id number]  [id number]  [id number]  [id number]^LILLIAM^ANDREA^JERMAINE     [facility]  [facility]  Echocardiography Laboratory  [address]  [place] [address]  Phone [phone]  Fax [phone]     Name: PAM MONROY  MRN: [id number]  : 1940  Study Date: 2025 10:33 AM  Age: 84 yrs  Gender: Male  Patient Location: [facility]  Reason For Study: Ascending aorta dilatation, Primary hypertension  Ordering Physician: ANDREA VYAS  Referring Physician: ANDREA VYAS  Performed By: Heather Anguiano     BSA: 1.9 m2  Height: 66 in  Weight: 186 lb  HR: 54  BP: 132/64 mmHg  ______________________________________________________________________________  Procedure  Echocardiogram with two-dimensional, color and spectral Doppler. Optison (NDC  #[id number]) given intravenously.     ______________________________________________________________________________  Interpretation Summary     The left ventricle is normal in size.  The visual ejection fraction is 60-65%.  Diastolic Doppler findings (E/E' ratio and/or other parameters) suggest left  ventricular filling pressures are normal.  No regional wall motion abnormalities noted.  No significant valvular heart disease.  Ascending aorta measured offline and appears to be 4.0cm (technician  measurement appears oblique).  ______________________________________________________________________________  Left Ventricle  The left ventricle is normal in size. There is normal left ventricular wall  thickness. The visual ejection fraction is 60-65%. Diastolic Doppler findings  (E/E' ratio and/or other parameters) suggest left ventricular filling  pressures are normal. No regional wall motion abnormalities noted.     Right Ventricle  The right ventricle is normal in size and function.     Atria  Normal left atrial size. Right  atrial size is normal. There is no color  Doppler evidence of an atrial shunt.     Mitral Valve  The mitral valve leaflets are mildly thickened. There is trace mitral  regurgitation.     Tricuspid Valve  There is trace tricuspid regurgitation.     Aortic Valve  The aortic valve is not well visualized. There is trace aortic regurgitation.     Pulmonic Valve  There is trace pulmonic valvular regurgitation.     Pericardium  There is no pericardial effusion.     Rhythm  Sinus rhythm was noted.     ______________________________________________________________________________  MMode/2D Measurements & Calculations  Ao root diam: 3.5 cm  asc Aorta Diam: 4.3 cm  LVOT diam: 2.4 cm  LVOT area: 4.6 cm2  Ao root diam index Ht(cm/m): 2.1  Ao root diam index BSA (cm/m2): 1.8  Asc Ao diam index BSA (cm/m2): 2.2  Asc Ao diam index Ht(cm/m): 2.5  LA Volume (BP): 43.6 ml     LA Volume Index (BP): 22.5 ml/m2     Doppler Measurements & Calculations  MV E max andrei: 42.8 cm/sec  MV A max andrei: 58.7 cm/sec  MV E/A: 0.73  MV dec slope: 128.1 cm/sec2  MV dec time: 0.33 sec  Ao V2 max: 118.0 cm/sec  Ao max P.0 mmHg  Ao V2 mean: 76.0 cm/sec  Ao mean PG: 3.0 mmHg  Ao V2 VTI: 27.7 cm  JEREMIAH(I,D): 4.1 cm2  JEREMIAH(V,D): 4.2 cm2  LV V1 max P.8 mmHg  LV V1 max: 109.0 cm/sec  LV V1 VTI: 24.5 cm  SV(LVOT): 112.5 ml  SI(LVOT): 58.0 ml/m2  PA acc time: 0.11 sec  AV Andrei Ratio (DI): 0.92     JEREMIAH Index (cm2/m2): 2.1  E/E' av.7  Lateral E/e': 6.3  Medial E/e': 7.0  RV S Andrei: 12.8 cm/sec     ______________________________________________________________________________  Report approved by: hCidi Bryant MD on 2025 12:07 PM

## 2025-05-12 NOTE — PATIENT INSTRUCTIONS
Thank you for your visit with the [facility] today.    Today's Summary:    Start Amlodipine 2.5 mg at bedtime.  Continue Losartan 100 mg daily in the morning.  Limit salt (sodium) intake.  Echocardiogram approximately May 2025.    Follow-up:  Cardiology follow up at [facility]: 1-year.     Cardiology Scheduling ~[phone]  Cardiology Clinic RN ~ [phone]    It was a pleasure seeing you today.     NADJA Duff, CNP  Certified Nurse Practitioner  [facility]  May 12, 2025  ________________________________________________________

## 2025-05-12 NOTE — LETTER
5/12/2025    Tom Oneal MD  [address]  [place] [address]    RE: Nicky Hernández       Dear Colleague,     I had the pleasure of seeing Nicky Hernández in the [facility].              ~Cardiology Clinic Visit~    Primary Cardiologist: Dr. Connolly  Reason for visit: Annual follow up    History of Present Illness    Nicky Hernández is an extremely pleasant 83 year old male with a history of hypertension, hyperlipidemia, and ascending aortic dilatation, here for routine follow-up.  His FH is notable for his nephew unfortunately dying of a ruptured TAA in early 2023.     Patient was seen in cardiology clinic in May 2024 for his annual visit.  At that time, he reported that he was doing well from a cardiac standpoint and had no new concerns.  He had no chest pain, shortness of breath, lower extremity swelling, palpitations, lightheadedness or any other worries.    Blood pressur was noted to be elevated with systolic in the 160s of soft 80s.  He self reports monitoring his blood pressure at home with improved readings, he notes average systolic at home in the mid 130s/low 140s.  At time, he had a lipid profile from November 2023 showing , HDL 44, LDL 65, .  He had normal BMP, hemoglobin A1c at that time at 5.8%.    Initial TTE from 11/2020 showed thoracic aorta measuring 4.3 cm.  This was followed with CT chest measuring a max diameter of 3.7 cm.  Repeat TTE 4/2022 showed maximal aortic diameter of 4.2 cm, mildly dilated RV and no other significant abnormalities.  Again, a repeat TTE from 6/2023 showed maximal aortic diameter of 3.6 cm (note, poor image quality per report).    Diagnostics:  5/9/2025 echocardiogram: EF 60-65% with normal LV wall thickness and no WMA.  RV normal.  Aortic root measuring 3.5 cm, ascending aorta measuring closer to 4.0 cm however imaging of the aorta overall was felt to be suboptimal.    Interval 05/12/25    Patient continues to do well from a cardiac  standpoint.  He feels that he can do all of his regular activities without limitations.  He denies shortness of breath, chest pain, swelling.  Labs reviewed and are stable with BMP showing , K4.5, BUN 12.8, CR 0.93, hemoglobin 13.5.  FLP from 5/9/2025 showing , HDL 45, LDL 60, TG 68.      His blood pressure today on initial check remains elevated at 147 pulse rate 61.  Patient follows with sleep medicine.  He saw his primary in December 2020 for and was started on amlodipine 5 mg daily.  He notes that he took this prescription for a while, and then stopped as he felt his blood pressure was adequately controlled.  His average home readings are systolic 130-140s.  We discussed the importance of optimizing blood pressure in context of dilated aorta.  __________________________________________________________________         Assessment and Impression:     Ascending aorta dilation  Coronary artery calcification on CT  AAA without rupture  Essential hypertension  Hyperlipidemia  RICHIE on CPAP         Recommendations and Plan:     Continue losartan 100 mg daily.  Start Eliquis 2.5 mg daily at bedtime.  Patient counseled.  Work on lifestyle modification for BP control including sodium restriction and daily exercise.  Repeat serial TTE in 1 year, approximately May 2026.  Follow-up in cardiology clinic in approximately 1-year.  __________________________________________________________________    Thank you for the opportunity to participate in this pleasant patient's care.    We would be happy to see this patient sooner for any concerns in the meantime.    NADJA Duff, CNP   Nurse Practitioner  [facility] [place]    Today's clinic visit entailed:  The following tests were independently interpreted by me as noted in my documentation: echo, labs  Ordering of each unique test  Prescription drug management  The level of medical decision making during this visit was of moderate  complexity.  Review of the result(s) of each unique test - cardiac testing, cardiac imaging, labs  Care everywhere reviewed for additional records to facilitate comprehensive patient care.  Recent hospitalization records and notes reviewed to facilitate comprehensive care coordination.    Orders this Visit:  Orders Placed This Encounter   Procedures     Follow-Up with Cardiology- CARLENE     Echocardiogram Complete     Orders Placed This Encounter   Medications     amLODIPine (NORVASC) 2.5 MG tablet     Sig: Take 1 tablet (2.5 mg) by mouth at bedtime. Half tablet oral daily.     Dispense:  90 tablet     Refill:  4     Medications Discontinued During This Encounter   Medication Reason     amLODIPine (NORVASC) 5 MG tablet      Encounter Diagnoses   Name Primary?     Hypertension, unspecified type      Primary hypertension      Ascending aorta dilatation Yes     Mixed hyperlipidemia      RICHIE (obstructive sleep apnea)      ILD (interstitial lung disease) (H)      CURRENT MEDICATIONS:  Current Outpatient Medications   Medication Sig Dispense Refill     amLODIPine (NORVASC) 2.5 MG tablet Take 1 tablet (2.5 mg) by mouth at bedtime. Half tablet oral daily. 90 tablet 4     aspirin (ASA) 81 MG tablet Take 81 mg by mouth daily       atorvastatin (LIPITOR) 20 MG tablet Take 1 tablet (20 mg) by mouth daily. 90 tablet 3     losartan (COZAAR) 100 MG tablet Take 1 tablet (100 mg) by mouth daily. 90 tablet 3     tamsulosin (FLOMAX) 0.4 MG capsule Take 1 capsule (0.4 mg) by mouth daily. 90 capsule 3     Vitamin D3 (CHOLECALCIFEROL) 125 MCG (5000 UT) tablet Take by mouth daily       ALLERGIES   No Known Allergies  PAST MEDICAL, SURGICAL, FAMILY, SOCIAL HISTORY:  History was reviewed and updated as needed, see medical record.    Review of Systems:  A 10-point Review Of Systems is otherwise normal except for that which is noted in the HPI and interval summary.    Physical Exam:    Vitals: BP (!) 147/76 (BP Location: Right arm, Patient  "Position: Sitting)   Pulse 61   Ht 1.676 m (5' 6\")   Wt 83 kg (183 lb)   BMI 29.54 kg/m    Constitutional: Appears stated age, well nourished, NAD.  Respiratory: Non-labored. Lungs clear  Cardiovascular: RRR, normal S1 and S2. No murmur.  No edema.  GI: Soft, non-distended, non-tender.  Skin: Warm and dry.   Musculoskeletal/Extremities: Symmetrical movement.  Neurologic: No gross focal deficits. Alert, awake.  Psychiatric: Affect appropriate. Mentation normal.    Recent Lab Results:  LIPID RESULTS:  Lab Results   Component Value Date    CHOL 119 05/09/2025    CHOL 109 02/12/2021    HDL 45 05/09/2025    HDL 46 02/12/2021    LDL 60 05/09/2025    LDL 38 02/12/2021    TRIG 68 05/09/2025    TRIG 124 02/12/2021     LIVER ENZYME RESULTS:  Lab Results   Component Value Date    AST 30 12/02/2024    AST 30 02/12/2021    ALT 21 05/09/2025    ALT 44 02/12/2021     CBC RESULTS:  Lab Results   Component Value Date    WBC 6.5 05/09/2025    WBC 6.9 02/12/2021    RBC 4.62 05/09/2025    RBC 4.61 02/12/2021    HGB 13.5 05/09/2025    HGB 13.6 02/12/2021    HCT 41.0 05/09/2025    HCT 41.4 02/12/2021    MCV 89 05/09/2025    MCV 90 02/12/2021    MCH 29.2 05/09/2025    MCH 29.5 02/12/2021    MCHC 32.9 05/09/2025    MCHC 32.9 02/12/2021    RDW 13.2 05/09/2025    RDW 13.4 02/12/2021     05/09/2025     02/12/2021     BMP RESULTS:  Lab Results   Component Value Date     05/09/2025     02/12/2021    POTASSIUM 4.5 05/09/2025    POTASSIUM 4.1 10/22/2021    POTASSIUM 4.4 02/12/2021    CHLORIDE 103 05/09/2025    CHLORIDE 104 10/22/2021    CHLORIDE 106 02/12/2021    CO2 26 05/09/2025    CO2 26 10/22/2021    CO2 26 02/12/2021    ANIONGAP 11 05/09/2025    ANIONGAP 7 10/22/2021    ANIONGAP 5 02/12/2021    GLC 98 05/09/2025    GLC 94 10/22/2021    GLC 93 02/12/2021    BUN 12.8 05/09/2025    BUN 10 10/22/2021    BUN 14 02/12/2021    CR 0.93 05/09/2025    CR 0.96 02/12/2021    GFRESTIMATED 81 05/09/2025    GFRESTIMATED 75 " "2021    GFRESTBLACK 86 2021    JOSE FRANCISCO 9.7 2025    JOSE FRANCISCO 9.8 2021      A1C RESULTS:  Lab Results   Component Value Date    A1C 6.0 (H) 2024     INR RESULTS:  No results found for: \"INR\"    Recent Results (from the past 4320 hours)   Echocardiogram Complete   Result Value    LVEF  60-65%    [facility]    [id number]  [id number]  [id number]  [id number]^LILLIAM^ANDREA^JERMAINE     [facility]  Echocardiography Laboratory  [address]  [place] [address]  Phone [phone]  Fax [phone]     Name: PAM MONROY  MRN: [id number]  : 1940  Study Date: 2025 10:33 AM  Age: 84 yrs  Gender: Male  Patient [facility]: [facility]  Reason For Study: Ascending aorta dilatation, Primary hypertension  Ordering Physician: ANDREA VYAS  Referring Physician: ANDREA VYAS  Performed By: Heather Anguiano     BSA: 1.9 m2  Height: 66 in  Weight: 186 lb  HR: 54  BP: 132/64 mmHg  ______________________________________________________________________________  Procedure  Echocardiogram with two-dimensional, color and spectral Doppler. Optison (NDC  #[id number]) given intravenously.     ______________________________________________________________________________  Interpretation Summary     The left ventricle is normal in size.  The visual ejection fraction is 60-65%.  Diastolic Doppler findings (E/E' ratio and/or other parameters) suggest left  ventricular filling pressures are normal.  No regional wall motion abnormalities noted.  No significant valvular heart disease.  Ascending aorta measured offline and appears to be 4.0cm (technician  measurement appears oblique).  ______________________________________________________________________________  Left Ventricle  The left ventricle is normal in size. There is normal left ventricular wall  thickness. The visual ejection fraction is 60-65%. Diastolic Doppler findings  (E/E' ratio " and/or other parameters) suggest left ventricular filling  pressures are normal. No regional wall motion abnormalities noted.     Right Ventricle  The right ventricle is normal in size and function.     Atria  Normal left atrial size. Right atrial size is normal. There is no color  Doppler evidence of an atrial shunt.     Mitral Valve  The mitral valve leaflets are mildly thickened. There is trace mitral  regurgitation.     Tricuspid Valve  There is trace tricuspid regurgitation.     Aortic Valve  The aortic valve is not well visualized. There is trace aortic regurgitation.     Pulmonic Valve  There is trace pulmonic valvular regurgitation.     Pericardium  There is no pericardial effusion.     Rhythm  Sinus rhythm was noted.     ______________________________________________________________________________  MMode/2D Measurements & Calculations  Ao root diam: 3.5 cm  asc Aorta Diam: 4.3 cm  LVOT diam: 2.4 cm  LVOT area: 4.6 cm2  Ao root diam index Ht(cm/m): 2.1  Ao root diam index BSA (cm/m2): 1.8  Asc Ao diam index BSA (cm/m2): 2.2  Asc Ao diam index Ht(cm/m): 2.5  LA Volume (BP): 43.6 ml     LA Volume Index (BP): 22.5 ml/m2     Doppler Measurements & Calculations  MV E max andrei: 42.8 cm/sec  MV A max andrei: 58.7 cm/sec  MV E/A: 0.73  MV dec slope: 128.1 cm/sec2  MV dec time: 0.33 sec  Ao V2 max: 118.0 cm/sec  Ao max P.0 mmHg  Ao V2 mean: 76.0 cm/sec  Ao mean PG: 3.0 mmHg  Ao V2 VTI: 27.7 cm  JEREMIAH(I,D): 4.1 cm2  JEREMIAH(V,D): 4.2 cm2  LV V1 max P.8 mmHg  LV V1 max: 109.0 cm/sec  LV V1 VTI: 24.5 cm  SV(LVOT): 112.5 ml  SI(LVOT): 58.0 ml/m2  PA acc time: 0.11 sec  AV Andrei Ratio (DI): 0.92     JEREMIAH Index (cm2/m2): 2.1  E/E' av.7  Lateral E/e': 6.3  Medial E/e': 7.0  RV S Andrei: 12.8 cm/sec     ______________________________________________________________________________  Report approved by: Chidi Bryant MD on 2025 12:07 PM                              Thank you for allowing me to participate in the care of your  patient.      Sincerely,     NADJA Duff CNP     [facility]  cc:   Justo Connolly MD  [address] [id number]

## 2025-05-13 DIAGNOSIS — I10 HYPERTENSION, UNSPECIFIED TYPE: ICD-10-CM

## 2025-05-13 RX ORDER — AMLODIPINE BESYLATE 2.5 MG/1
2.5 TABLET ORAL AT BEDTIME
Qty: 90 TABLET | Refills: 4 | Status: SHIPPED | OUTPATIENT
Start: 2025-05-13

## (undated) DEVICE — EYE KNIFE STILETTO VISITEC 1.1MM ANG 45DEG SIDEPORT 376620

## (undated) DEVICE — SOL WATER IRRIG 500ML BOTTLE 2F7113

## (undated) DEVICE — EYE PACK CUSTOM ANTERIOR 30DEG TIP CENTURION PPK6682-04

## (undated) DEVICE — EYE RING MALYUGIN PUPIL EXPANDER 6.25MM MAL-000-1

## (undated) DEVICE — EYE TIP IRRIGATION & ASPIRATION POLYMER 35D BENT 8065751511

## (undated) DEVICE — EYE KNIFE SLIT XSTAR VISITEC 2.5MM 45DEG BEVEL UP 373725

## (undated) DEVICE — EYE HANDPIECE 23GA VITRECTOMY CENTURION 8065752134

## (undated) DEVICE — Device

## (undated) DEVICE — GLOVE BIOGEL PI MICRO SZ 8.0 48580

## (undated) DEVICE — EYE CANN IRR 25GA CYSTOTOME 581610

## (undated) DEVICE — EYE SHIELD PLASTIC

## (undated) DEVICE — GLOVE BIOGEL PI MICRO SZ 7.5 48575

## (undated) DEVICE — PACK CATARACT CUSTOM ASC SEY15CPUMC

## (undated) DEVICE — SU ETHILON 10-0 CS160-6 12" 9000G

## (undated) DEVICE — EYE NDL RETROBULBAR ATKINSON 25GA 1.5" 581637

## (undated) DEVICE — EYE DRSG PAD OVAL

## (undated) DEVICE — TAPE MICROPORE 1"X1.5YD 1530S-1

## (undated) RX ORDER — ACETAMINOPHEN 325 MG/1
TABLET ORAL
Status: DISPENSED
Start: 2023-12-04

## (undated) RX ORDER — FENTANYL CITRATE 50 UG/ML
INJECTION, SOLUTION INTRAMUSCULAR; INTRAVENOUS
Status: DISPENSED
Start: 2023-12-04

## (undated) RX ORDER — ACETAMINOPHEN 325 MG/1
TABLET ORAL
Status: DISPENSED
Start: 2023-11-27

## (undated) RX ORDER — FENTANYL CITRATE 50 UG/ML
INJECTION, SOLUTION INTRAMUSCULAR; INTRAVENOUS
Status: DISPENSED
Start: 2023-11-27